# Patient Record
Sex: MALE | Race: WHITE | NOT HISPANIC OR LATINO | Employment: OTHER | URBAN - METROPOLITAN AREA
[De-identification: names, ages, dates, MRNs, and addresses within clinical notes are randomized per-mention and may not be internally consistent; named-entity substitution may affect disease eponyms.]

---

## 2017-03-23 ENCOUNTER — ANESTHESIA EVENT (OUTPATIENT)
Dept: SURGERY | Facility: CLINIC | Age: 77
End: 2017-03-23

## 2017-03-23 ENCOUNTER — APPOINTMENT (OUTPATIENT)
Dept: CT IMAGING | Facility: CLINIC | Age: 77
DRG: 336 | End: 2017-03-23
Attending: EMERGENCY MEDICINE
Payer: MEDICARE

## 2017-03-23 ENCOUNTER — HOSPITAL ENCOUNTER (INPATIENT)
Facility: CLINIC | Age: 77
LOS: 3 days | Discharge: HOME | DRG: 336 | End: 2017-03-26
Attending: EMERGENCY MEDICINE | Admitting: HOSPITALIST
Payer: MEDICARE

## 2017-03-23 ENCOUNTER — ANESTHESIA (OUTPATIENT)
Dept: SURGERY | Facility: CLINIC | Age: 77
End: 2017-03-23

## 2017-03-23 ENCOUNTER — ANESTHESIA EVENT (OUTPATIENT)
Dept: SURGERY | Facility: CLINIC | Age: 77
DRG: 336 | End: 2017-03-23
Payer: MEDICARE

## 2017-03-23 ENCOUNTER — ANESTHESIA (OUTPATIENT)
Dept: SURGERY | Facility: CLINIC | Age: 77
DRG: 336 | End: 2017-03-23
Payer: MEDICARE

## 2017-03-23 DIAGNOSIS — K56.609 SMALL BOWEL OBSTRUCTION (H): ICD-10-CM

## 2017-03-23 LAB
ALBUMIN SERPL-MCNC: 4.1 G/DL (ref 3.4–5)
ALBUMIN UR-MCNC: NEGATIVE MG/DL
ALP SERPL-CCNC: 98 U/L (ref 40–150)
ALT SERPL W P-5'-P-CCNC: 25 U/L (ref 0–70)
ANION GAP SERPL CALCULATED.3IONS-SCNC: 12 MMOL/L (ref 3–14)
ANION GAP SERPL CALCULATED.3IONS-SCNC: 7 MMOL/L (ref 3–14)
APPEARANCE UR: CLEAR
AST SERPL W P-5'-P-CCNC: 21 U/L (ref 0–45)
BASOPHILS # BLD AUTO: 0.1 10E9/L (ref 0–0.2)
BASOPHILS NFR BLD AUTO: 0.4 %
BILIRUB SERPL-MCNC: 0.8 MG/DL (ref 0.2–1.3)
BILIRUB UR QL STRIP: NEGATIVE
BUN SERPL-MCNC: 23 MG/DL (ref 7–30)
BUN SERPL-MCNC: 27 MG/DL (ref 7–30)
CALCIUM SERPL-MCNC: 8.4 MG/DL (ref 8.5–10.1)
CALCIUM SERPL-MCNC: 9 MG/DL (ref 8.5–10.1)
CHLORIDE SERPL-SCNC: 102 MMOL/L (ref 94–109)
CHLORIDE SERPL-SCNC: 106 MMOL/L (ref 94–109)
CO2 SERPL-SCNC: 26 MMOL/L (ref 20–32)
CO2 SERPL-SCNC: 28 MMOL/L (ref 20–32)
COLOR UR AUTO: YELLOW
CREAT SERPL-MCNC: 0.76 MG/DL (ref 0.66–1.25)
CREAT SERPL-MCNC: 0.84 MG/DL (ref 0.66–1.25)
DIFFERENTIAL METHOD BLD: ABNORMAL
EOSINOPHIL # BLD AUTO: 0 10E9/L (ref 0–0.7)
EOSINOPHIL NFR BLD AUTO: 0.2 %
ERYTHROCYTE [DISTWIDTH] IN BLOOD BY AUTOMATED COUNT: 11.8 % (ref 10–15)
ERYTHROCYTE [DISTWIDTH] IN BLOOD BY AUTOMATED COUNT: 11.8 % (ref 10–15)
GFR SERPL CREATININE-BSD FRML MDRD: 88 ML/MIN/1.7M2
GFR SERPL CREATININE-BSD FRML MDRD: ABNORMAL ML/MIN/1.7M2
GLUCOSE SERPL-MCNC: 140 MG/DL (ref 70–99)
GLUCOSE SERPL-MCNC: 182 MG/DL (ref 70–99)
GLUCOSE UR STRIP-MCNC: NEGATIVE MG/DL
HCT VFR BLD AUTO: 48.3 % (ref 40–53)
HCT VFR BLD AUTO: 49.7 % (ref 40–53)
HGB BLD-MCNC: 16.7 G/DL (ref 13.3–17.7)
HGB BLD-MCNC: 17.3 G/DL (ref 13.3–17.7)
HGB UR QL STRIP: NEGATIVE
IMM GRANULOCYTES # BLD: 0.1 10E9/L (ref 0–0.4)
IMM GRANULOCYTES NFR BLD: 0.5 %
INTERPRETATION ECG - MUSE: NORMAL
KETONES UR STRIP-MCNC: NEGATIVE MG/DL
LACTATE BLD-SCNC: 1.4 MMOL/L (ref 0.7–2.1)
LACTATE BLD-SCNC: 2.1 MMOL/L (ref 0.7–2.1)
LEUKOCYTE ESTERASE UR QL STRIP: NEGATIVE
LIPASE SERPL-CCNC: 95 U/L (ref 73–393)
LYMPHOCYTES # BLD AUTO: 2.6 10E9/L (ref 0.8–5.3)
LYMPHOCYTES NFR BLD AUTO: 15.3 %
MCH RBC QN AUTO: 33.7 PG (ref 26.5–33)
MCH RBC QN AUTO: 33.9 PG (ref 26.5–33)
MCHC RBC AUTO-ENTMCNC: 34.6 G/DL (ref 31.5–36.5)
MCHC RBC AUTO-ENTMCNC: 34.8 G/DL (ref 31.5–36.5)
MCV RBC AUTO: 97 FL (ref 78–100)
MCV RBC AUTO: 97 FL (ref 78–100)
MONOCYTES # BLD AUTO: 0.9 10E9/L (ref 0–1.3)
MONOCYTES NFR BLD AUTO: 5.2 %
MUCOUS THREADS #/AREA URNS LPF: PRESENT /LPF
NEUTROPHILS # BLD AUTO: 13.3 10E9/L (ref 1.6–8.3)
NEUTROPHILS NFR BLD AUTO: 78.4 %
NITRATE UR QL: NEGATIVE
NRBC # BLD AUTO: 0 10*3/UL
NRBC BLD AUTO-RTO: 0 /100
PH UR STRIP: 5 PH (ref 5–7)
PLATELET # BLD AUTO: 173 10E9/L (ref 150–450)
PLATELET # BLD AUTO: 216 10E9/L (ref 150–450)
POTASSIUM SERPL-SCNC: 3.8 MMOL/L (ref 3.4–5.3)
POTASSIUM SERPL-SCNC: 4.7 MMOL/L (ref 3.4–5.3)
PROT SERPL-MCNC: 7.1 G/DL (ref 6.8–8.8)
RBC # BLD AUTO: 4.96 10E12/L (ref 4.4–5.9)
RBC # BLD AUTO: 5.11 10E12/L (ref 4.4–5.9)
RBC #/AREA URNS AUTO: 2 /HPF (ref 0–2)
SODIUM SERPL-SCNC: 140 MMOL/L (ref 133–144)
SODIUM SERPL-SCNC: 141 MMOL/L (ref 133–144)
SP GR UR STRIP: 1.03 (ref 1–1.03)
SQUAMOUS #/AREA URNS AUTO: <1 /HPF (ref 0–1)
URN SPEC COLLECT METH UR: ABNORMAL
UROBILINOGEN UR STRIP-MCNC: 0 MG/DL (ref 0–2)
WBC # BLD AUTO: 17 10E9/L (ref 4–11)
WBC # BLD AUTO: 9.7 10E9/L (ref 4–11)
WBC #/AREA URNS AUTO: <1 /HPF (ref 0–2)

## 2017-03-23 PROCEDURE — 25000125 ZZHC RX 250: Performed by: NURSE ANESTHETIST, CERTIFIED REGISTERED

## 2017-03-23 PROCEDURE — 25000128 H RX IP 250 OP 636: Performed by: NURSE ANESTHETIST, CERTIFIED REGISTERED

## 2017-03-23 PROCEDURE — 96374 THER/PROPH/DIAG INJ IV PUSH: CPT | Mod: 59

## 2017-03-23 PROCEDURE — 81001 URINALYSIS AUTO W/SCOPE: CPT | Performed by: EMERGENCY MEDICINE

## 2017-03-23 PROCEDURE — 83605 ASSAY OF LACTIC ACID: CPT | Performed by: HOSPITALIST

## 2017-03-23 PROCEDURE — 25000128 H RX IP 250 OP 636: Performed by: HOSPITALIST

## 2017-03-23 PROCEDURE — 96376 TX/PRO/DX INJ SAME DRUG ADON: CPT

## 2017-03-23 PROCEDURE — 71000012 ZZH RECOVERY PHASE 1 LEVEL 1 FIRST HR: Performed by: SURGERY

## 2017-03-23 PROCEDURE — 36000058 ZZH SURGERY LEVEL 3 EA 15 ADDTL MIN: Performed by: SURGERY

## 2017-03-23 PROCEDURE — 27210794 ZZH OR GENERAL SUPPLY STERILE: Performed by: SURGERY

## 2017-03-23 PROCEDURE — 85025 COMPLETE CBC W/AUTO DIFF WBC: CPT | Performed by: EMERGENCY MEDICINE

## 2017-03-23 PROCEDURE — 37000008 ZZH ANESTHESIA TECHNICAL FEE, 1ST 30 MIN: Performed by: SURGERY

## 2017-03-23 PROCEDURE — 25000128 H RX IP 250 OP 636: Performed by: SURGERY

## 2017-03-23 PROCEDURE — 25000132 ZZH RX MED GY IP 250 OP 250 PS 637: Mod: GY | Performed by: HOSPITALIST

## 2017-03-23 PROCEDURE — 36000056 ZZH SURGERY LEVEL 3 1ST 30 MIN: Performed by: SURGERY

## 2017-03-23 PROCEDURE — 25500064 ZZH RX 255 OP 636: Performed by: EMERGENCY MEDICINE

## 2017-03-23 PROCEDURE — 40000306 ZZH STATISTIC PRE PROC ASSESS II: Performed by: SURGERY

## 2017-03-23 PROCEDURE — 25800025 ZZH RX 258: Performed by: ANESTHESIOLOGY

## 2017-03-23 PROCEDURE — 85027 COMPLETE CBC AUTOMATED: CPT | Performed by: HOSPITALIST

## 2017-03-23 PROCEDURE — 25000128 H RX IP 250 OP 636: Performed by: EMERGENCY MEDICINE

## 2017-03-23 PROCEDURE — 83690 ASSAY OF LIPASE: CPT | Performed by: EMERGENCY MEDICINE

## 2017-03-23 PROCEDURE — S0028 INJECTION, FAMOTIDINE, 20 MG: HCPCS | Performed by: HOSPITALIST

## 2017-03-23 PROCEDURE — 36415 COLL VENOUS BLD VENIPUNCTURE: CPT | Performed by: HOSPITALIST

## 2017-03-23 PROCEDURE — 99285 EMERGENCY DEPT VISIT HI MDM: CPT | Mod: 25

## 2017-03-23 PROCEDURE — 80048 BASIC METABOLIC PNL TOTAL CA: CPT | Performed by: HOSPITALIST

## 2017-03-23 PROCEDURE — 25000125 ZZHC RX 250: Performed by: HOSPITALIST

## 2017-03-23 PROCEDURE — 44180 LAP ENTEROLYSIS: CPT | Performed by: SURGERY

## 2017-03-23 PROCEDURE — 25000125 ZZHC RX 250: Performed by: SURGERY

## 2017-03-23 PROCEDURE — 99223 1ST HOSP IP/OBS HIGH 75: CPT | Mod: AI | Performed by: HOSPITALIST

## 2017-03-23 PROCEDURE — A9270 NON-COVERED ITEM OR SERVICE: HCPCS | Mod: GY | Performed by: HOSPITALIST

## 2017-03-23 PROCEDURE — 25800025 ZZH RX 258: Performed by: NURSE ANESTHETIST, CERTIFIED REGISTERED

## 2017-03-23 PROCEDURE — 83605 ASSAY OF LACTIC ACID: CPT | Performed by: EMERGENCY MEDICINE

## 2017-03-23 PROCEDURE — 99222 1ST HOSP IP/OBS MODERATE 55: CPT | Mod: 57 | Performed by: SURGERY

## 2017-03-23 PROCEDURE — 0DN84ZZ RELEASE SMALL INTESTINE, PERCUTANEOUS ENDOSCOPIC APPROACH: ICD-10-PCS | Performed by: SURGERY

## 2017-03-23 PROCEDURE — 25000566 ZZH SEVOFLURANE, EA 15 MIN: Performed by: SURGERY

## 2017-03-23 PROCEDURE — 80053 COMPREHEN METABOLIC PANEL: CPT | Performed by: EMERGENCY MEDICINE

## 2017-03-23 PROCEDURE — 44005 FREEING OF BOWEL ADHESION: CPT | Mod: AS | Performed by: PHYSICIAN ASSISTANT

## 2017-03-23 PROCEDURE — 25000128 H RX IP 250 OP 636: Performed by: ANESTHESIOLOGY

## 2017-03-23 PROCEDURE — 74177 CT ABD & PELVIS W/CONTRAST: CPT

## 2017-03-23 PROCEDURE — 25800025 ZZH RX 258: Performed by: HOSPITALIST

## 2017-03-23 PROCEDURE — 37000009 ZZH ANESTHESIA TECHNICAL FEE, EACH ADDTL 15 MIN: Performed by: SURGERY

## 2017-03-23 PROCEDURE — 12000000 ZZH R&B MED SURG/OB

## 2017-03-23 RX ORDER — CEFAZOLIN SODIUM 2 G/100ML
2 INJECTION, SOLUTION INTRAVENOUS
Status: COMPLETED | OUTPATIENT
Start: 2017-03-23 | End: 2017-03-23

## 2017-03-23 RX ORDER — EPHEDRINE SULFATE 50 MG/ML
INJECTION, SOLUTION INTRAMUSCULAR; INTRAVENOUS; SUBCUTANEOUS PRN
Status: DISCONTINUED | OUTPATIENT
Start: 2017-03-23 | End: 2017-03-23

## 2017-03-23 RX ORDER — GLYCOPYRROLATE 0.2 MG/ML
INJECTION, SOLUTION INTRAMUSCULAR; INTRAVENOUS PRN
Status: DISCONTINUED | OUTPATIENT
Start: 2017-03-23 | End: 2017-03-23

## 2017-03-23 RX ORDER — ACETAMINOPHEN 325 MG/1
650 TABLET ORAL EVERY 4 HOURS PRN
Status: DISCONTINUED | OUTPATIENT
Start: 2017-03-23 | End: 2017-03-26 | Stop reason: HOSPADM

## 2017-03-23 RX ORDER — DEXAMETHASONE SODIUM PHOSPHATE 4 MG/ML
INJECTION, SOLUTION INTRA-ARTICULAR; INTRALESIONAL; INTRAMUSCULAR; INTRAVENOUS; SOFT TISSUE PRN
Status: DISCONTINUED | OUTPATIENT
Start: 2017-03-23 | End: 2017-03-23

## 2017-03-23 RX ORDER — LABETALOL HYDROCHLORIDE 5 MG/ML
10 INJECTION, SOLUTION INTRAVENOUS
Status: COMPLETED | OUTPATIENT
Start: 2017-03-23 | End: 2017-03-23

## 2017-03-23 RX ORDER — HYDROMORPHONE HYDROCHLORIDE 1 MG/ML
.3-.5 INJECTION, SOLUTION INTRAMUSCULAR; INTRAVENOUS; SUBCUTANEOUS EVERY 5 MIN PRN
Status: DISCONTINUED | OUTPATIENT
Start: 2017-03-23 | End: 2017-03-23 | Stop reason: HOSPADM

## 2017-03-23 RX ORDER — LIDOCAINE HYDROCHLORIDE 10 MG/ML
INJECTION, SOLUTION INFILTRATION; PERINEURAL PRN
Status: DISCONTINUED | OUTPATIENT
Start: 2017-03-23 | End: 2017-03-23

## 2017-03-23 RX ORDER — POTASSIUM CHLORIDE 7.45 MG/ML
10 INJECTION INTRAVENOUS
Status: DISCONTINUED | OUTPATIENT
Start: 2017-03-23 | End: 2017-03-26 | Stop reason: HOSPADM

## 2017-03-23 RX ORDER — NALOXONE HYDROCHLORIDE 0.4 MG/ML
.1-.4 INJECTION, SOLUTION INTRAMUSCULAR; INTRAVENOUS; SUBCUTANEOUS
Status: DISCONTINUED | OUTPATIENT
Start: 2017-03-23 | End: 2017-03-23

## 2017-03-23 RX ORDER — HYDROMORPHONE HYDROCHLORIDE 1 MG/ML
.3-.5 INJECTION, SOLUTION INTRAMUSCULAR; INTRAVENOUS; SUBCUTANEOUS
Status: DISCONTINUED | OUTPATIENT
Start: 2017-03-23 | End: 2017-03-26 | Stop reason: HOSPADM

## 2017-03-23 RX ORDER — PIPERACILLIN SODIUM, TAZOBACTAM SODIUM 3; .375 G/15ML; G/15ML
3.38 INJECTION, POWDER, LYOPHILIZED, FOR SOLUTION INTRAVENOUS
Status: DISCONTINUED | OUTPATIENT
Start: 2017-03-23 | End: 2017-03-23 | Stop reason: HOSPADM

## 2017-03-23 RX ORDER — SODIUM CHLORIDE 9 MG/ML
1000 INJECTION, SOLUTION INTRAVENOUS CONTINUOUS
Status: DISCONTINUED | OUTPATIENT
Start: 2017-03-23 | End: 2017-03-23

## 2017-03-23 RX ORDER — ONDANSETRON 2 MG/ML
4 INJECTION INTRAMUSCULAR; INTRAVENOUS EVERY 6 HOURS PRN
Status: DISCONTINUED | OUTPATIENT
Start: 2017-03-23 | End: 2017-03-26 | Stop reason: HOSPADM

## 2017-03-23 RX ORDER — METOPROLOL TARTRATE 25 MG/1
25 TABLET, FILM COATED ORAL 2 TIMES DAILY
Status: DISCONTINUED | OUTPATIENT
Start: 2017-03-23 | End: 2017-03-26 | Stop reason: HOSPADM

## 2017-03-23 RX ORDER — LABETALOL HYDROCHLORIDE 5 MG/ML
10 INJECTION, SOLUTION INTRAVENOUS
Status: DISCONTINUED | OUTPATIENT
Start: 2017-03-23 | End: 2017-03-23 | Stop reason: HOSPADM

## 2017-03-23 RX ORDER — DEXAMETHASONE SODIUM PHOSPHATE 4 MG/ML
4 INJECTION, SOLUTION INTRA-ARTICULAR; INTRALESIONAL; INTRAMUSCULAR; INTRAVENOUS; SOFT TISSUE
Status: DISCONTINUED | OUTPATIENT
Start: 2017-03-23 | End: 2017-03-23 | Stop reason: HOSPADM

## 2017-03-23 RX ORDER — NALOXONE HYDROCHLORIDE 0.4 MG/ML
.1-.4 INJECTION, SOLUTION INTRAMUSCULAR; INTRAVENOUS; SUBCUTANEOUS
Status: DISCONTINUED | OUTPATIENT
Start: 2017-03-23 | End: 2017-03-26 | Stop reason: HOSPADM

## 2017-03-23 RX ORDER — AMLODIPINE BESYLATE 5 MG/1
5 TABLET ORAL DAILY
Status: DISCONTINUED | OUTPATIENT
Start: 2017-03-23 | End: 2017-03-26 | Stop reason: HOSPADM

## 2017-03-23 RX ORDER — HYDRALAZINE HYDROCHLORIDE 20 MG/ML
2.5-5 INJECTION INTRAMUSCULAR; INTRAVENOUS EVERY 10 MIN PRN
Status: DISCONTINUED | OUTPATIENT
Start: 2017-03-23 | End: 2017-03-23 | Stop reason: HOSPADM

## 2017-03-23 RX ORDER — ONDANSETRON 4 MG/1
4 TABLET, ORALLY DISINTEGRATING ORAL EVERY 6 HOURS PRN
Status: DISCONTINUED | OUTPATIENT
Start: 2017-03-23 | End: 2017-03-26 | Stop reason: HOSPADM

## 2017-03-23 RX ORDER — PROCHLORPERAZINE 25 MG
12.5 SUPPOSITORY, RECTAL RECTAL EVERY 12 HOURS PRN
Status: DISCONTINUED | OUTPATIENT
Start: 2017-03-23 | End: 2017-03-26 | Stop reason: HOSPADM

## 2017-03-23 RX ORDER — BUPIVACAINE HYDROCHLORIDE AND EPINEPHRINE 5; 5 MG/ML; UG/ML
INJECTION, SOLUTION PERINEURAL PRN
Status: DISCONTINUED | OUTPATIENT
Start: 2017-03-23 | End: 2017-03-23 | Stop reason: HOSPADM

## 2017-03-23 RX ORDER — SODIUM CHLORIDE, SODIUM LACTATE, POTASSIUM CHLORIDE, CALCIUM CHLORIDE 600; 310; 30; 20 MG/100ML; MG/100ML; MG/100ML; MG/100ML
INJECTION, SOLUTION INTRAVENOUS CONTINUOUS PRN
Status: DISCONTINUED | OUTPATIENT
Start: 2017-03-23 | End: 2017-03-23

## 2017-03-23 RX ORDER — ONDANSETRON 2 MG/ML
INJECTION INTRAMUSCULAR; INTRAVENOUS
Status: DISCONTINUED
Start: 2017-03-23 | End: 2017-03-23 | Stop reason: HOSPADM

## 2017-03-23 RX ORDER — DROPERIDOL 2.5 MG/ML
0.62 INJECTION, SOLUTION INTRAMUSCULAR; INTRAVENOUS
Status: DISCONTINUED | OUTPATIENT
Start: 2017-03-23 | End: 2017-03-23 | Stop reason: RX

## 2017-03-23 RX ORDER — MAGNESIUM SULFATE HEPTAHYDRATE 40 MG/ML
4 INJECTION, SOLUTION INTRAVENOUS EVERY 4 HOURS PRN
Status: DISCONTINUED | OUTPATIENT
Start: 2017-03-23 | End: 2017-03-26 | Stop reason: HOSPADM

## 2017-03-23 RX ORDER — HYDROMORPHONE HYDROCHLORIDE 1 MG/ML
0.5 INJECTION, SOLUTION INTRAMUSCULAR; INTRAVENOUS; SUBCUTANEOUS
Status: DISCONTINUED | OUTPATIENT
Start: 2017-03-23 | End: 2017-03-23

## 2017-03-23 RX ORDER — ONDANSETRON 4 MG/1
4 TABLET, ORALLY DISINTEGRATING ORAL EVERY 30 MIN PRN
Status: DISCONTINUED | OUTPATIENT
Start: 2017-03-23 | End: 2017-03-23 | Stop reason: HOSPADM

## 2017-03-23 RX ORDER — IOPAMIDOL 755 MG/ML
500 INJECTION, SOLUTION INTRAVASCULAR ONCE
Status: COMPLETED | OUTPATIENT
Start: 2017-03-23 | End: 2017-03-23

## 2017-03-23 RX ORDER — ONDANSETRON 2 MG/ML
4 INJECTION INTRAMUSCULAR; INTRAVENOUS EVERY 30 MIN PRN
Status: DISCONTINUED | OUTPATIENT
Start: 2017-03-23 | End: 2017-03-23 | Stop reason: HOSPADM

## 2017-03-23 RX ORDER — FENTANYL CITRATE 50 UG/ML
INJECTION, SOLUTION INTRAMUSCULAR; INTRAVENOUS PRN
Status: DISCONTINUED | OUTPATIENT
Start: 2017-03-23 | End: 2017-03-23

## 2017-03-23 RX ORDER — POTASSIUM CHLORIDE 29.8 MG/ML
20 INJECTION INTRAVENOUS
Status: DISCONTINUED | OUTPATIENT
Start: 2017-03-23 | End: 2017-03-26 | Stop reason: HOSPADM

## 2017-03-23 RX ORDER — PROCHLORPERAZINE MALEATE 5 MG
5 TABLET ORAL EVERY 6 HOURS PRN
Status: DISCONTINUED | OUTPATIENT
Start: 2017-03-23 | End: 2017-03-26 | Stop reason: HOSPADM

## 2017-03-23 RX ORDER — SODIUM CHLORIDE, SODIUM LACTATE, POTASSIUM CHLORIDE, CALCIUM CHLORIDE 600; 310; 30; 20 MG/100ML; MG/100ML; MG/100ML; MG/100ML
INJECTION, SOLUTION INTRAVENOUS CONTINUOUS
Status: DISCONTINUED | OUTPATIENT
Start: 2017-03-23 | End: 2017-03-23 | Stop reason: HOSPADM

## 2017-03-23 RX ORDER — NEOSTIGMINE METHYLSULFATE 1 MG/ML
VIAL (ML) INJECTION PRN
Status: DISCONTINUED | OUTPATIENT
Start: 2017-03-23 | End: 2017-03-23

## 2017-03-23 RX ORDER — DIMENHYDRINATE 50 MG/ML
25 INJECTION, SOLUTION INTRAMUSCULAR; INTRAVENOUS
Status: DISCONTINUED | OUTPATIENT
Start: 2017-03-23 | End: 2017-03-23 | Stop reason: HOSPADM

## 2017-03-23 RX ORDER — POTASSIUM CHLORIDE 1500 MG/1
20-40 TABLET, EXTENDED RELEASE ORAL
Status: DISCONTINUED | OUTPATIENT
Start: 2017-03-23 | End: 2017-03-26 | Stop reason: HOSPADM

## 2017-03-23 RX ORDER — CEFAZOLIN SODIUM 1 G/3ML
1 INJECTION, POWDER, FOR SOLUTION INTRAMUSCULAR; INTRAVENOUS SEE ADMIN INSTRUCTIONS
Status: DISCONTINUED | OUTPATIENT
Start: 2017-03-23 | End: 2017-03-23 | Stop reason: HOSPADM

## 2017-03-23 RX ORDER — LABETALOL HYDROCHLORIDE 5 MG/ML
10 INJECTION, SOLUTION INTRAVENOUS ONCE
Status: COMPLETED | OUTPATIENT
Start: 2017-03-23 | End: 2017-03-23

## 2017-03-23 RX ORDER — FENTANYL CITRATE 50 UG/ML
25-50 INJECTION, SOLUTION INTRAMUSCULAR; INTRAVENOUS
Status: DISCONTINUED | OUTPATIENT
Start: 2017-03-23 | End: 2017-03-23 | Stop reason: HOSPADM

## 2017-03-23 RX ORDER — PROPOFOL 10 MG/ML
INJECTION, EMULSION INTRAVENOUS PRN
Status: DISCONTINUED | OUTPATIENT
Start: 2017-03-23 | End: 2017-03-23

## 2017-03-23 RX ORDER — KETOROLAC TROMETHAMINE 30 MG/ML
15 INJECTION, SOLUTION INTRAMUSCULAR; INTRAVENOUS
Status: DISCONTINUED | OUTPATIENT
Start: 2017-03-23 | End: 2017-03-23 | Stop reason: HOSPADM

## 2017-03-23 RX ORDER — LIDOCAINE 40 MG/G
CREAM TOPICAL
Status: DISCONTINUED | OUTPATIENT
Start: 2017-03-23 | End: 2017-03-23 | Stop reason: HOSPADM

## 2017-03-23 RX ORDER — DEXTROSE MONOHYDRATE, SODIUM CHLORIDE, AND POTASSIUM CHLORIDE 50; 1.49; 4.5 G/1000ML; G/1000ML; G/1000ML
INJECTION, SOLUTION INTRAVENOUS CONTINUOUS
Status: DISCONTINUED | OUTPATIENT
Start: 2017-03-23 | End: 2017-03-26 | Stop reason: HOSPADM

## 2017-03-23 RX ORDER — OXYCODONE HYDROCHLORIDE 5 MG/1
5-10 TABLET ORAL
Status: DISCONTINUED | OUTPATIENT
Start: 2017-03-23 | End: 2017-03-26 | Stop reason: HOSPADM

## 2017-03-23 RX ORDER — POTASSIUM CHLORIDE 1.5 G/1.58G
20-40 POWDER, FOR SOLUTION ORAL
Status: DISCONTINUED | OUTPATIENT
Start: 2017-03-23 | End: 2017-03-26 | Stop reason: HOSPADM

## 2017-03-23 RX ADMIN — SODIUM CHLORIDE 1000 ML: 9 INJECTION, SOLUTION INTRAVENOUS at 02:04

## 2017-03-23 RX ADMIN — FAMOTIDINE 20 MG: 10 INJECTION, SOLUTION INTRAVENOUS at 06:17

## 2017-03-23 RX ADMIN — OXYCODONE HYDROCHLORIDE 10 MG: 5 TABLET ORAL at 11:14

## 2017-03-23 RX ADMIN — HYDROMORPHONE HYDROCHLORIDE 0.5 MG: 1 INJECTION, SOLUTION INTRAMUSCULAR; INTRAVENOUS; SUBCUTANEOUS at 02:06

## 2017-03-23 RX ADMIN — HYDROMORPHONE HYDROCHLORIDE 0.5 MG: 1 INJECTION, SOLUTION INTRAMUSCULAR; INTRAVENOUS; SUBCUTANEOUS at 10:39

## 2017-03-23 RX ADMIN — PHENYLEPHRINE HYDROCHLORIDE 50 MCG: 10 INJECTION, SOLUTION INTRAMUSCULAR; INTRAVENOUS; SUBCUTANEOUS at 16:11

## 2017-03-23 RX ADMIN — CEFAZOLIN SODIUM 2 G: 2 INJECTION, SOLUTION INTRAVENOUS at 15:56

## 2017-03-23 RX ADMIN — FENTANYL CITRATE 50 MCG: 50 INJECTION, SOLUTION INTRAMUSCULAR; INTRAVENOUS at 16:31

## 2017-03-23 RX ADMIN — PHENYLEPHRINE HYDROCHLORIDE 150 MCG: 10 INJECTION, SOLUTION INTRAMUSCULAR; INTRAVENOUS; SUBCUTANEOUS at 16:08

## 2017-03-23 RX ADMIN — SODIUM CHLORIDE, POTASSIUM CHLORIDE, SODIUM LACTATE AND CALCIUM CHLORIDE: 600; 310; 30; 20 INJECTION, SOLUTION INTRAVENOUS at 15:56

## 2017-03-23 RX ADMIN — ROCURONIUM BROMIDE 15 MG: 10 INJECTION INTRAVENOUS at 16:33

## 2017-03-23 RX ADMIN — METOPROLOL TARTRATE 1 MG: 5 INJECTION INTRAVENOUS at 16:20

## 2017-03-23 RX ADMIN — POTASSIUM CHLORIDE, DEXTROSE MONOHYDRATE AND SODIUM CHLORIDE: 150; 5; 450 INJECTION, SOLUTION INTRAVENOUS at 19:09

## 2017-03-23 RX ADMIN — HYDROMORPHONE HYDROCHLORIDE 0.5 MG: 1 INJECTION, SOLUTION INTRAMUSCULAR; INTRAVENOUS; SUBCUTANEOUS at 01:31

## 2017-03-23 RX ADMIN — Medication 4 MG: at 16:55

## 2017-03-23 RX ADMIN — ONDANSETRON 4 MG: 2 INJECTION INTRAMUSCULAR; INTRAVENOUS at 08:04

## 2017-03-23 RX ADMIN — ROCURONIUM BROMIDE 35 MG: 10 INJECTION INTRAVENOUS at 16:05

## 2017-03-23 RX ADMIN — SODIUM CHLORIDE, POTASSIUM CHLORIDE, SODIUM LACTATE AND CALCIUM CHLORIDE: 600; 310; 30; 20 INJECTION, SOLUTION INTRAVENOUS at 16:16

## 2017-03-23 RX ADMIN — ROCURONIUM BROMIDE 15 MG: 10 INJECTION INTRAVENOUS at 16:30

## 2017-03-23 RX ADMIN — PROPOFOL 160 MG: 10 INJECTION, EMULSION INTRAVENOUS at 16:05

## 2017-03-23 RX ADMIN — FENTANYL CITRATE 50 MCG: 50 INJECTION, SOLUTION INTRAMUSCULAR; INTRAVENOUS at 16:25

## 2017-03-23 RX ADMIN — PHENYLEPHRINE HYDROCHLORIDE 200 MCG: 10 INJECTION, SOLUTION INTRAMUSCULAR; INTRAVENOUS; SUBCUTANEOUS at 16:20

## 2017-03-23 RX ADMIN — FENTANYL CITRATE 100 MCG: 50 INJECTION, SOLUTION INTRAMUSCULAR; INTRAVENOUS at 16:03

## 2017-03-23 RX ADMIN — LABETALOL HYDROCHLORIDE 10 MG: 5 INJECTION, SOLUTION INTRAVENOUS at 17:20

## 2017-03-23 RX ADMIN — SODIUM CHLORIDE 64 ML: 9 INJECTION, SOLUTION INTRAVENOUS at 02:17

## 2017-03-23 RX ADMIN — ONDANSETRON 4 MG: 2 INJECTION INTRAMUSCULAR; INTRAVENOUS at 19:08

## 2017-03-23 RX ADMIN — PHENYLEPHRINE HYDROCHLORIDE 150 MCG: 10 INJECTION, SOLUTION INTRAMUSCULAR; INTRAVENOUS; SUBCUTANEOUS at 16:15

## 2017-03-23 RX ADMIN — HYDROMORPHONE HYDROCHLORIDE 0.5 MG: 1 INJECTION, SOLUTION INTRAMUSCULAR; INTRAVENOUS; SUBCUTANEOUS at 08:01

## 2017-03-23 RX ADMIN — PHENYLEPHRINE HYDROCHLORIDE 150 MCG: 10 INJECTION, SOLUTION INTRAMUSCULAR; INTRAVENOUS; SUBCUTANEOUS at 16:26

## 2017-03-23 RX ADMIN — LIDOCAINE HYDROCHLORIDE 50 MG: 10 INJECTION, SOLUTION INFILTRATION; PERINEURAL at 16:05

## 2017-03-23 RX ADMIN — Medication 10 MG: at 16:11

## 2017-03-23 RX ADMIN — HYDROMORPHONE HYDROCHLORIDE 0.5 MG: 1 INJECTION, SOLUTION INTRAMUSCULAR; INTRAVENOUS; SUBCUTANEOUS at 13:26

## 2017-03-23 RX ADMIN — SODIUM CHLORIDE 1000 ML: 9 INJECTION, SOLUTION INTRAVENOUS at 02:05

## 2017-03-23 RX ADMIN — ONDANSETRON 4 MG: 2 INJECTION INTRAMUSCULAR; INTRAVENOUS at 16:05

## 2017-03-23 RX ADMIN — PHENYLEPHRINE HYDROCHLORIDE 100 MCG: 10 INJECTION, SOLUTION INTRAMUSCULAR; INTRAVENOUS; SUBCUTANEOUS at 16:53

## 2017-03-23 RX ADMIN — PHENYLEPHRINE HYDROCHLORIDE 0 MCG/KG/MIN: 10 INJECTION, SOLUTION INTRAMUSCULAR; INTRAVENOUS; SUBCUTANEOUS at 16:16

## 2017-03-23 RX ADMIN — IOPAMIDOL 95 ML: 755 INJECTION, SOLUTION INTRAVENOUS at 02:14

## 2017-03-23 RX ADMIN — LABETALOL HYDROCHLORIDE 10 MG: 5 INJECTION, SOLUTION INTRAVENOUS at 17:53

## 2017-03-23 RX ADMIN — GLYCOPYRROLATE 0.6 MG: 0.2 INJECTION, SOLUTION INTRAMUSCULAR; INTRAVENOUS at 16:55

## 2017-03-23 RX ADMIN — METOPROLOL TARTRATE 3 MG: 5 INJECTION INTRAVENOUS at 17:12

## 2017-03-23 RX ADMIN — POTASSIUM CHLORIDE, DEXTROSE MONOHYDRATE AND SODIUM CHLORIDE: 150; 5; 450 INJECTION, SOLUTION INTRAVENOUS at 06:20

## 2017-03-23 RX ADMIN — GLYCOPYRROLATE 0.1 MG: 0.2 INJECTION, SOLUTION INTRAMUSCULAR; INTRAVENOUS at 16:05

## 2017-03-23 RX ADMIN — DEXAMETHASONE SODIUM PHOSPHATE 4 MG: 4 INJECTION, SOLUTION INTRAMUSCULAR; INTRAVENOUS at 16:05

## 2017-03-23 ASSESSMENT — COPD QUESTIONNAIRES: COPD: 0

## 2017-03-23 ASSESSMENT — ENCOUNTER SYMPTOMS
HEMATURIA: 0
DYSRHYTHMIAS: 0
STRIDOR: 0
SEIZURES: 0
DYSURIA: 0
DIFFICULTY URINATING: 0
NAUSEA: 1
CONSTIPATION: 1
ABDOMINAL PAIN: 1
BACK PAIN: 0
DIARRHEA: 0
FREQUENCY: 0

## 2017-03-23 ASSESSMENT — PAIN DESCRIPTION - DESCRIPTORS
DESCRIPTORS: CONSTANT;THROBBING

## 2017-03-23 ASSESSMENT — LIFESTYLE VARIABLES: TOBACCO_USE: 1

## 2017-03-23 NOTE — PROVIDER NOTIFICATION
Dr. Mcmahan, GIOVANY, at the bedside and orders received to administer Labetalol 10mg for systolic blood pressures in the 200's. Will administer and continue to monitor.

## 2017-03-23 NOTE — OP NOTE
General Surgery Operative Note    Pre-operative diagnosis: Closed Loop Small Bowel Obstruction   Post-operative diagnosis: same   Procedure: Laparoscopic Lysis of Adhesions and Diagnostic Laparoscopy   Surgeon: Ramón Garcia MD   Assistant(s): Melani Joyce PA-C,  the physician assistant was medically necessary to assist in prepping, positioning, camera operation, retraction/exposure and closure of the port site.      Anesthesia: General    Estimated blood loss: 2 cc's   Drains placed: None   Complications:  None   Findings:   there was a hemorrhagic loop of bowel in the central abdomen with an obvious obstructing band going up to near the umbilicus.  This band was divided, allowing the bowel to mobilize.  We followed the hemorrhagic loop in both directions until completely normal-looking bowel was encountered.  All the visualized hemorrhagic bowel appeared viable.       Indications for operation: This is a 76-year-old gentleman who presented with abdominal pain, nausea and vomiting.  He presented to the emergency room, and a CT scan revealed a small bowel obstruction with concern for a closed loop.  Immediate operation was recommended, but the patient was feeling somewhat better, and refused operative intervention last night.  He has continued to have some discomfort, and was once again seen by Dr. Landa, who recommended surgery.  The patient now agreed to proceed.  We discussed the procedure, along with its risks and complications, in detail.  The patient agreed to proceed.    Details of the operation: After informed consent, the patient was taken to the operating room where he underwent satisfactory induction of general anesthesia.  The patient was sterilely prepped and draped and a supraumbilical skin incision was made.  Dissection was carried bluntly down to the fascia, which was opened using electrocautery.  The Capone trocar was introduced and fixed in place using stay sutures.  Pneumoperitoneum was now  achieved using CO2 insufflation.  The camera was introduced, and immediately we saw some hemorrhagic bowel.  Looking towards the umbilicus, there was an obvious area where the bowel was stuck up near the abdominal wall.  In order to facilitate visualization and manipulation here, three right-sided 5 mm ports were placed under direct visualization we now moved the camera out laterally, and we are able to see a tight band causing an obstruction near the umbilicus.  This band was divided using blunt dissection, freeing the loop of hemorrhagic bowel.  We now proceeded to run the bowel from the area of hemorrhagic change proximally and distally until entirely normal-looking bowel was identified.  All of the hemorrhagic appearing bowel seemed viable.  There was a moderate amount of mesenteric and bowel edema.  The sites for the patient's previous gastrostomy and apparent jejunostomy were not involved in the obstruction.  The permanent adhesions and those sites were left in place.  All of the remainder of the visualized bowel appeared normal.  The small bowel was not run for its entire length, due to the fact that we could clearly identify the area of obstruction, and I wish to avoid the potential of manipulation injury.  The trocar sites were now infiltrated with 0.5% Marcaine and the trochars were removed under direct visualization.  The supraumbilical fascia was closed using interrupted 0 Vicryl sutures, and the skin was closed using 4-0 subcuticular Vicryl followed by Steri-Strips.    The patient tolerated the procedure well and was transferred to the recovery room in satisfactory condition.  Sponge and needle counts were correct at the close of the case.    Specimens: * No specimens in log *        Ramón Garcia MD

## 2017-03-23 NOTE — ANESTHESIA PREPROCEDURE EVALUATION
PAC NOTE:       ANESTHESIA PRE EVALUATION:  Anesthesia Evaluation     . Pt has had prior anesthetic. Type: General    No history of anesthetic complications          ROS/MED HX    ENT/Pulmonary:     (+)CADENCE risk factors hypertension, tobacco use, Past use Intermittent asthma , . .   (-) COPD and recent URI   Neurologic:     (+)delerium (after thoracotomies/haital hernia repair 2 years ago.) resolved Yes,    (-) seizures and CVA   Cardiovascular:     (+) hypertension----. : . . . :. . Previous cardiac testing date:results:date: results:ECG reviewed date:3/17 results:Sinus.  1st degree AV block. date: results:         (-) CAD, arrhythmias and valvular problems/murmurs   METS/Exercise Tolerance:     Hematologic: Comments: Lab Test        03/23/17     03/23/17     02/25/15      --          02/13/15     02/12/15     02/11/15                       0543          0115          0605           --           0747          1840          0737          WBC          9.7          17.0*        9.2            < >        10.8         9.6          7.8           HGB          16.7         17.3         11.4*          < >        14.6         14.0         13.4          MCV          97           97           101*           < >        93           95           97            PLT          173          216          237            < >        103*         101*         108*          INR           --           --           --           --          1.03         1.10         1.32*          < > = values in this interval not displayed.                  Lab Test        03/23/17     03/23/17     02/24/15                       0543          0115          0606          NA           141          140          142           POTASSIUM    4.7          3.8          4.2           CHLORIDE     106          102          110*          CO2          28           26           27            BUN          23           27           32*           CR           0.76         0.84          0.72          ANIONGAP     7            12           5             JOANN          8.4*         9.0          7.7*          GLC          140*         182*         101*         - neg hematologic  ROS       Musculoskeletal:  - neg musculoskeletal ROS       GI/Hepatic:  - neg GI/hepatic ROS      (-) GERD, hepatitis and liver disease   Renal/Genitourinary:  - ROS Renal section negative       Endo:  - neg endo ROS    (-) Type I DM, Type II DM, thyroid disease, chronic steroid usage and obesity   Psychiatric:  - neg psychiatric ROS       Infectious Disease:  - neg infectious disease ROS       Malignancy:         Other:    - neg other ROS                 Physical Exam  Normal systems: cardiovascular, pulmonary and dental    Airway   Mallampati: II  TM distance: >3 FB  Neck ROM: full    Dental     Cardiovascular   Rhythm and rate: regular and normal  (-) no friction rub, no systolic click and no murmur    Pulmonary    breath sounds clear to auscultation(-) no rhonchi, no decreased breath sounds, no wheezes, no rales and no stridor             Anesthesia Plan      History & Physical Review  History and physical reviewed and following examination; no interval change.    ASA Status:  2 .    NPO Status:  > 8 hours    Plan for General and ETT with Intravenous induction. Maintenance will be Balanced.    PONV prophylaxis:  Ondansetron (or other 5HT-3) and Dexamethasone or Solumedrol       Postoperative Care  Postoperative pain management:  IV analgesics.      Consents  Anesthetic plan, risks, benefits and alternatives discussed with:  Patient or representative, Patient and Spouse..                            .

## 2017-03-23 NOTE — PROGRESS NOTES
Labs improved today and VSS.  Pain returned 5 hours ago.  crampy and 4/10.  Passes some flatus and tiny amt of stool 5 hours ago, none since.  Denies nausea.  We discussed, once again, my recommendation for exploratory surgery and the reasoning (bowel ischemia and rupture).  The pt tentatively elects to proceed but wishes to wait for his wife to arrive to make a final decision.  I have urged him to call his wife and ask her to come in asap.  Case discussed with Dr. Garcia.  Marietta Landa MD

## 2017-03-23 NOTE — CONSULTS
General Surgery Consultation    Pablo Fuentes MRN# [id number]   Age: 76 year old YOB: 1940     Date of Admission:  3/23/2017    Reason for consult:            Abdominal pain, left upper and lower quadrant       Requesting physician:            Dr. Titus                Assessment and Plan:   Assessment:   Small bowel obstruction with possible closed loop mechanism.  Leukocytosis and lactic acidosis.  Pt's pain is now resolved and he is minimally tender.      Plan:   I have discussed with the pt and his wife the nature and risk associated with a closed loop sbo.  I recommend urgent operative intervention.  They understand the risk of bowel ischemia and perforation without surgical intervention.  Specifically we discussed exploratory laparoscopy and possible laparotomy with possible small bowel resection and primary anastomosis. They understand these risks and decline surgery.      Admit to the hospitalist [facility].  Continue conservative management with bowel rest, npo, IVF, and NG tube decompression if abd pain, nausea or vomiting recur.  Surgical intervention may be needed if the clinical picture worsens or if the patient fails to progress with conservative measure.                 Chief Complaint:   Abdominal pain, left upper and lower quadrant    History is obtained from the patient         History of Present Illness:   This patient is a 76 year old  male who presents with abdominal pain in the left upper quadrant and in the left lower quadrant for the past 9 hours.  The pain was constant but has resolved.  Last IV dilaudid was 2 hrs ago.  Associated symptoms include with nausea and vomiting.  He denies nausea now. Negative for associated symptoms of diarrhea, fever and chills.  Last bowel movement was earlier this evening after the onset of pain.  Last flatus this evening.  At baseline BMs are formed.   He does not have a history of bowel obstructions in the past.            Past Medical  History:    has a past medical history of Hypertension.          Past Surgical History:     Past Surgical History:   Procedure Laterality Date     BRONCHOSCOPY FLEXIBLE N/A 2/13/2015    Procedure: BRONCHOSCOPY FLEXIBLE;  Surgeon: Christo Gilbert MD;  Location: [facility] OR     ESOPHAGOSCOPY, GASTROSCOPY, DUODENOSCOPY (EGD), COMBINED N/A 2/10/2015    Procedure: COMBINED ESOPHAGOSCOPY, GASTROSCOPY, DUODENOSCOPY (EGD);  Surgeon: Christo Gilbert MD;  Location:  OR     EYE SURGERY       HERNIA REPAIR       LAPAROSCOPIC ASSISTED INSERTION TUBE GASTROTOMY N/A 2/13/2015    Procedure: LAPAROSCOPIC ASSISTED INSERTION TUBE GASTROSTOMY;  Surgeon: Christo Gilbert MD;  Location:  OR     LAPAROSCOPIC HERNIORRHAPHY GIANT PARAESOPHAGEAL N/A 2/13/2015    Procedure: LAPAROSCOPIC HERNIORRHAPHY GIANT PARAESOPHAGEAL;  Surgeon: Christo Gilbert MD;  Location:  OR             Social History:     Social History   Substance Use Topics     Smoking status: Never Smoker     Smokeless tobacco: Not on file     Alcohol use No             Family History:   Noncontributory         Allergies:   All allergies reviewed and addressed          Medications:     No current facility-administered medications on file prior to encounter.   Current Outpatient Prescriptions on File Prior to Encounter:  multivitamin, therapeutic with minerals (THERA-VIT-M) TABS Take 1 tablet by mouth daily   OXYCODONE HCL PO Take 2.5 mg by mouth every 4 hours as needed   Acetaminophen (TYLENOL PO) Take 1,000 mg by mouth 3 times daily   AmLODIPine Besylate (NORVASC PO) Take 5 mg by mouth daily   Metoprolol Tartrate (LOPRESSOR PO) Take 25 mg by mouth 2 times daily   Ranitidine HCl (ZANTAC PO) Take 150 mg by mouth 2 times daily   sennosides (SENOKOT) 8.6 MG tablet Take 1 tablet by mouth 2 times daily   QUEtiapine (SEROQUEL) 50 MG tablet Take 1 tablet (50 mg) by mouth 2 times daily   albuterol (2.5 MG/3ML) 0.083% nebulizer solution Take 1 vial  "(2.5 mg) by nebulization every 2 hours as needed for other (dyspnea)       piperacillin-tazobactam  3.375 g Intravenous Pre-Op/Pre-procedure x 1 dose            Review of Systems:   The 10 point review of systems is negative other than noted in the HPI.          Physical Exam:   /76  Temp 98.2  F (36.8  C) (Temporal)  Resp 18  Ht 1.727 m (5' 8\")  Wt 86.2 kg (190 lb)  SpO2 93%  BMI 28.89 kg/m2  General - Well developed, well nourished male in no apparent distress  HEENT:  Head normocephalic and atraumatic, pupils equal and round, conjunctivae clear, no scleral icterus, mucous membranes dry, external ears and nose normal  Neck: Supple without thyromegaly or masses  Lymphatic: No cervical, or supraclavicular lymphadenopathy  Lungs: Clear to auscultation bilaterally  Heart: regular rate and rhythm, no murmurs  Abdomen:   soft, obese, not distended with mild tenderness noted in the left upper quadrant.  No rebound or guarding.  no masses palpated.  Extremities: Warm without edema  Neurologic: nonfocal  Psychiatric: Mood and affect appropriate  Skin: Without lesions, rashes, or juandice         Data:     Lab Results   Component Value Date    WBC 17.0 03/23/2017     Lab Results   Component Value Date    HGB 17.3 03/23/2017     Lab Results   Component Value Date     03/23/2017     Last Basic Metabolic Panel:  Lab Results   Component Value Date     03/23/2017      Lab Results   Component Value Date    POTASSIUM 3.8 03/23/2017     Lab Results   Component Value Date    CHLORIDE 102 03/23/2017     Lab Results   Component Value Date    JOANN 9.0 03/23/2017     Lab Results   Component Value Date    CO2 26 03/23/2017     Lab Results   Component Value Date    BUN 27 03/23/2017     Lab Results   Component Value Date    CR 0.84 03/23/2017     Lab Results   Component Value Date     03/23/2017         Imaging:  All imaging studies reviewed by me.    Results for orders placed or performed during the " hospital encounter of 03/23/17   CT Abdomen Pelvis w Contrast    Narrative    CT ABDOMEN PELVIS W CONTRAST  3/23/2017 2:22 AM     HISTORY: Abdominal pain. Lower abdominal pain. History of hernia  repair.    TECHNIQUE: Volumetric acquisition through abdomen and pelvis with I.V.  contrast. 95 mL Isovue-370. Radiation dose for this scan was reduced  using automated exposure control, adjustment of the mA and/or kV  according to patient size, or iterative reconstruction technique.    COMPARISON: 2/10/2015.    FINDINGS: Multiple large gallstones in a mildly distended gallbladder.  No pericholecystic inflammation. There are a few tiny hypodensities in  the liver which are too small to characterize but are probably tiny  cysts. Pancreas, spleen, adrenal glands and kidneys demonstrate no  worrisome findings. Repair of the previously seen large hiatal hernia.  Again seen is a prominent duodenal diverticulum adjacent to the  pancreatic head.    Multiple fluid-filled dilated small bowel loops with decompressed  distal small bowel compatible with mechanical obstruction. There  appears to be an abrupt transition site in the left anterior pelvis.  Configuration of other bowel loops in this region suggests the  possibility that this is an internal hernia or other closed loop  obstruction. There is moderate fat stranding and mesenteric edema  adjacent to some of these loops in the left abdomen. Colonic  diverticulosis most prominent in the sigmoid colon. Small amount of  ascites. No free air.      Impression    IMPRESSION:  1. Mechanical small bowel obstruction. An internal hernia or other  closed loop obstruction cannot be excluded. Associated ascites and  moderate mesenteric stranding and edema. Recommend surgical  consultation.  2. Cholelithiasis.  3. Colonic diverticulosis.    MAURICIO PLASENCIA MD       This note was created using voice recognition software. Undetected word substitutions or other errors may have occurred.      Marietta Landa MD    Time spent with the patient, reviewing the EMR, reviewing laboratory and imaging studies, more than 50% of which was counseling and coordinating care:  30 minutes.

## 2017-03-23 NOTE — PROGRESS NOTES
Patient seen and examined. He was admitted this morning. H&P reviewed, labs and medications reviewed. He started to have pain and got few doses of pain medications. Surgery reevaluated him and planning on taking him to the OR for exploratory lap.  Abdomen is soft, mildly tender, no guarding.  Continue to monitor post op.  I discussed with patient and his wife.

## 2017-03-23 NOTE — ANESTHESIA POSTPROCEDURE EVALUATION
Patient: Pablo Fuentes    Procedure(s):  Diagnostic Laparoscopy, Lysis of adhesion - Wound Class: I-Clean    Diagnosis:Small Bowel Obstruction  Diagnosis Additional Information: Closed Loop Small Bowel Obstruction    Anesthesia Type:  General, ETT    Note:  Anesthesia Post Evaluation    Patient location during evaluation: PACU  Patient participation: Able to fully participate in evaluation  Level of consciousness: awake  Pain management: adequate  Airway patency: patent  Cardiovascular status: acceptable  Respiratory status: acceptable  Hydration status: acceptable  PONV: controlled     Anesthetic complications: None          Last vitals:  Vitals:    03/23/17 1730 03/23/17 1735 03/23/17 1745   BP: (!) 182/140 (!) 193/136 (!) 177/110   Resp: 18 18 13   Temp:      SpO2: 96%           Electronically Signed By: Azael Mcmahan MD  March 23, 2017  5:58 PM

## 2017-03-23 NOTE — ANESTHESIA CARE TRANSFER NOTE
Patient: aPblo Fuentes    Procedure(s):  Diagnostic Laparoscopy, Lysis of adhesion - Wound Class: I-Clean    Diagnosis: Small Bowel Obstruction  Diagnosis Additional Information: No value filed.    Anesthesia Type:   General, ETT     Note:  Airway :Blow-by  Patient transferred to:PACU  Comments: Transferred to recovery with MDA, confused, spontaneous respirations, adequate ventilation/oxygenation during transfer, report shared.      Vitals: (Last set prior to Anesthesia Care Transfer)    CRNA VITALS  3/23/2017 1645 - 3/23/2017 1723      3/23/2017             Pulse: 85    SpO2: 96 %                Electronically Signed By: PAT Stewart CRNA  March 23, 2017  5:23 PM

## 2017-03-23 NOTE — PLAN OF CARE
Problem: Goal Outcome Summary  Goal: Goal Outcome Summary  RN: pt has been having steady amounts of pain since this am -pain semi-controlled with dilaudid. Pt and wife decided they will proceed with surgery updated Surgeon waiting to here when it will be scheduled.

## 2017-03-23 NOTE — PROVIDER NOTIFICATION
Dr. Timmons, [facility], notified of systolic blood pressures in the 160's. No new orders received. Okay to transfer back to the floor.

## 2017-03-23 NOTE — ED PROVIDER NOTES
"  History     Chief Complaint:  Abdominal Pain    HPI   Pablo Fuentes is a 76 year old male who presents to the emergency department today with his wife for evaluation of abdominal pain. The patient reports that at 1900 last evening, 03/22/2017, he suddenly developed some lower abdominal pain. The patient rates his pain as 8/10 in severity at its worst, and reports that since that time his pain has waxed and waned. The patient denies any radiation of this pain into his scrotum or back. The patient reports that today he had one small bowel movement which consisted of \"little pellets\" and with his abdominal pain the patient has had some dry heaves. He denies any urinary symptoms. The patient reports that his pain is similar to pain that he had several years ago when his \"stomach moved up into his diaphragm.\" At that time, the patient had a surgical procedure which resolved his pain. The patient normally receives his care at [facility].     Allergies:  No Known Drug Allergies    Medications:    Oxycodone  Tylenol  Norvasc  Lopressor  Zantac  Senokot  Seroquel  Albuterol     Past Medical History:    Hypertension     Past Surgical History:    Bronchoscopy Flexible  Esophagoscopy, gastroscopy, duodenoscopy (egd), combined    Eye Surgery  Hernia Repair  Laparoscopic assisted insertion tube gastrotomy   Laparoscopic herniorrhaphy giant paraesophageal    Family History:    No family history on file.    Social History:  The patient was accompanied to the ED by his wife.  Smoking Status: Never Smoker  Alcohol Use: Negative   Marital Status:   [2]     Review of Systems   Gastrointestinal: Positive for abdominal pain, constipation (Mild) and nausea. Negative for diarrhea.   Genitourinary: Negative for decreased urine volume, difficulty urinating, dysuria, frequency, hematuria, testicular pain and urgency.   Musculoskeletal: Negative for back pain.   All other systems reviewed and are negative.    Physical " "Exam   Vitals:  Patient Vitals for the past 24 hrs:   BP Temp Temp src Heart Rate Resp SpO2 Height Weight   03/23/17 0330 (!) 140/95 - - - - 93 % - -   03/23/17 0315 (!) 143/98 - - - - 94 % - -   03/23/17 0300 (!) 140/93 - - - - 92 % - -   03/23/17 0245 147/84 - - - - 94 % - -   03/23/17 0230 (!) 150/103 - - - - 95 % - -   03/23/17 0200 151/89 - - - - 96 % - -   03/23/17 0145 143/90 - - - - 95 % - -   03/23/17 0143 - - - - - 92 % - -   03/23/17 0142 - - - - - (!) 89 % - -   03/23/17 0130 143/84 - - - - 94 % - -   03/23/17 0115 (!) 139/93 - - - - 94 % - -   03/23/17 0100 (!) 135/94 - - - - - - -   03/23/17 0057 - 96.9  F (36.1  C) Temporal 70 18 96 % 1.727 m (5' 8\") 86.2 kg (190 lb)     Physical Exam    Nursing note and vitals reviewed.    Constitutional: Uncomfortable and ill appearing.          HENT:    Mouth/Throat: Oropharynx is without swelling or erythema. Oral mucosa moist.    Eyes: Conjunctivae normal are normal. No scleral icterus.    Neck: Neck supple.   Cardiovascular: Normal rate, regular rhythm and intact distal pulses.    Pulmonary/Chest: Effort normal and breath sounds normal.   Abdominal: Distended. Diffuse abdominal tenderness. Diminished abdominal sounds.   Genitourinary: No hernia palpable. No tenderness.   Musculoskeletal:  No edema, No calf tenderness  Neurological:Alert and answering questions appropriately. Coordination normal.   Skin: Skin is warm and dry. Pale appearing.   Psychiatric: Normal mood and affect.     Emergency Department Course     Imaging:  Radiology findings were communicated with the patient who voiced understanding of the findings.    CT Abdomen Pelvis with contrast  1. Mechanical small bowel obstruction. An internal hernia or other  closed loop obstruction cannot be excluded. Associated ascites and  moderate mesenteric stranding and edema. Recommend surgical  consultation.  2. Cholelithiasis.  3. Colonic diverticulosis.  MAURICIO PLASENCIA MD  Reading per radiology    POC US " Abdomen Limited  Unable to obtain adequate images due to pain and overlying bowel gas.     Laboratory:  Laboratory findings were communicated with the patient who voiced understanding of the findings.    CBC: WBC 17.0 (H), HGB 17.3,   CMP: Glucose 182 (H) o/w WNL (Creatinine 0.84)  Lactic Acid Whole Blood: 2.1  Lipase: 95     Interventions:  0131 Dilaudid 0.5 mg IV  0204 NS 1000 ml IV   0206 Dilaudid 0.5 mg IV    Emergency Department Course:  Nursing notes and vitals reviewed.  I performed an exam of the patient as documented above.   0131 I attempted to perform an ultrasound but the patient was very uncomfortable so the procedure was very limited.  The patient was sent for a CT Abdomen Pelvis with contrast and a POC US Abdomen Limited while in the emergency department, results above.   0328 I spoke with Dr. Marietta Landa of surgery regarding patient's presentation, findings, and plan of care.  I discussed the treatment plan with the patient. They expressed understanding of this plan and consented to admission. I discussed the patient with Dr. Hernandez, who will admit the patient to a monitored bed for further evaluation and treatment.  I personally reviewed the lab and imaging results with the patient and answered all related questions prior to admission.  Impression & Plan      Medical Decision Making:  Pablo Fuentes is a 76 year old male who presents for evaluation of vomiting and abdominal pain.  The differential diagnosis included but was not limited to bowel obstruction, pancreatitis, cholecystitis, biliary colic, ischemic bowel.     The clinical presentation is consistent with bowel obstruction which was confirmed by CT with findings of a closed loop obstruction and therefore Surgery was consulted.   There are no signs of other surgical emergencies.   Patient has been resuscitated with IVF. Symptoms have improved with antiemetics and parenteral pain medications and therefore an NG was not placed.   Cordell planned to meet the patient in the PAR and operate.     Dr. Landa later called back stating that his examination had improved considerably and that the patient preferred a trial of nonoperative management. She requested I speak with the hospitalist service about admission.      I have spoken with Dr. Hernandez who accepted the patient for admission for ongoing evaluation, monitoring and management.       Plan:   Admit to Dr. Hernandez for ongoing evaluation, monitoring and management.     Diagnosis:    ICD-10-CM    1. Small bowel obstruction (H) K56.69      Disposition:   The patient is admitted into the care of Dr. Hernandez.    Scribe Disclosure:  I, Tristen Suazo, am serving as a scribe at 1:21 AM on 3/23/2017 to document services personally performed by Monica Titus, based on my observations and the provider's statements to me.    3/23/2017   [facility] EMERGENCY DEPARTMENT       Monica Titus MD  03/24/17 0432

## 2017-03-23 NOTE — PROVIDER NOTIFICATION
Dr. Mcmahan at the bedside and orders received to give Labetalol 10 mg x1 for systolic blood pressures in the 200's. Will administer and continue to monitor.

## 2017-03-23 NOTE — IP AVS SNAPSHOT
[place] [id number] [facility]    [address]    [facility] [address]    Phone:  [phone]    Fax:  [phone]                                       After Visit Summary   3/23/2017    Pablo Fuentes    MRN: [id number]           After Visit Summary Signature Page     I have received my discharge instructions, and my questions have been answered. I have discussed any challenges I see with this plan with the nurse or doctor.    ..........................................................................................................................................  Patient/Patient Representative Signature      ..........................................................................................................................................  Patient Representative Print Name and Relationship to Patient    ..................................................               ................................................  Date                                            Time    ..........................................................................................................................................  Reviewed by Signature/Title    ...................................................              ..............................................  Date                                                            Time

## 2017-03-23 NOTE — H&P
HISTORY AND PHYSICAL     PRIMARY CARE PHYSICIAN:  Dr. Isaac Nunez.      CHIEF COMPLAINT:  Abdominal pain.      HISTORY OF PRESENT ILLNESS:  Pablo Fuentes is a 76-year-old male with a history of hypertension and incarcerated giant hiatal hernia requiring laparoscopic reduction with percutaneous endoscopic gastrostomy tube as well as bilateral thoracotomies and laparoscopic jejunostomy feeding tube placement due to a complicated postoperative hospitalization in which he was intubated for complications related to postoperative pneumonia and ICU delirium.  He developed abdominal pain at 7:00 p.m. on the night of the 22nd.  This was very sudden and diffuse pain.  He rates it as an 8/10.  He had multiple episodes of vomiting and persistent nausea.  He did have a bowel movement, in fact two bowel movements yesterday.  Due to these issues, he went to the urgent care.  His condition was concerning, so he was directed to the emergency department for evaluation.  Here, he was hemodynamically stable upon arrival.        VITAL SIGNS:  Temperature was 96.9, heart rate 70, blood pressure 135/94, respiratory rate 18 and oxygen saturation 96% on room air.      LABORATORY DATA:  normal Basic metabolic panel, liver function tests, lactate and lipase.  Glucose was 182, white blood cells 17.0.  Remainder of the CBC was unremarkable.  CT of the abdomen and pelvis showed mechanical small-bowel obstruction.  An Internal hernia or other closed loop obstruction cannot be excluded.  Associated ascites and moderate mesenteric stranding and edema were present as well.  Surgery was emergently consulted and took the patient to the operating room.  However, his symptoms abruptly abated and surgical intervention was canceled.  He was subsequently admitted to the Hospitalist Service for further cares.  Currently, the patient denies any headaches, vision changes, neck or back pain, chest pain, shortness of breath, cough, palpitations, nausea,  vomiting, diarrhea, abdominal pain, dysuria, hematuria or pyuria.  He has no other complaints at this time.      PAST MEDICAL HISTORY:   1.  Hypertension.   2.  Postoperative respiratory failure.   3.  Persistent Hospital-acquired delirium.   4.  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      PRIOR TO ADMISSION MEDICATIONS:   1.  Acetaminophen 1 gram p.o. t.i.d.   2.  Albuterol nebulizer 0.083% nebulized q.2h. p.r.n. shortness of breath.   3.  Amlodipine 5 mg p.o. daily.   4.  Metoprolol 25 mg p.o. b.i.d.   5.  Therapeutic multivitamin 1 tab p.o. daily.   6.  Oxycodone 2.5 mg q.4h. p.r.n. pain.   7.  Senokot 8.6 mg 1 tab p.o. b.i.d.      SOCIAL HISTORY:  The patient lives with his wife, who is here at the bedside.  He has no history of smoking, drinking or drug use.      FAMILY HISTORY:  Assessed and noncontributory.      ALLERGIES:  No known drug allergies.      REVIEW OF SYSTEMS:  A 10-point review of systems was performed and the pertinent positive findings are presented in history present illness.  Remainder review of systems is negative.      PHYSICAL EXAMINATION:   VITAL SIGNS:  Temperature 96.9, heart rate 70, blood pressure 135/94, respiratory rate 18, oxygen saturation 94% on room air.   GENERAL:  No apparent distress, awake, alert and oriented x3.   HEENT:  Normocephalic, atraumatic.  Extraocular movements are intact.   NECK:  Supple, without JVD.   CARDIOVASCULAR:  Regular rhythm, without murmurs, rubs or gallops.   PULMONARY:  Clear to auscultation bilaterally.   ABDOMEN:  Soft, nontender, nondistended, bowel sounds are present.   EXTREMITIES:  No cyanosis, clubbing or edema.   SKIN:  No rashes, ulcers or jaundice.   NEUROLOGICAL:  Cranial nerves II through XII are grossly intact.  No focal neurologic deficits.   PSYCHIATRIC:  Mood and  affect are appropriate.      LABORATORY, IMAGING AND PROCEDURES:  Personally reviewed and discussed pertinent findings noted in the HPI.      ASSESSMENT AND PLAN:  This is a 76-year-old male with a history of hypertension as well as complicated paraesophageal hernia repair requiring reduction complicated by malnutrition, delirium, postoperative respiratory failure and malnutrition requiring jejunostomy feeding tube placement now presenting to [facility] with abdominal pain and ultimately found to have a mechanical small-bowel obstruction.   1.  Mechanical small-bowel obstruction:  The patient is now entirely free of pain and nausea.  CT scan indicates that the cause could be a hernia.  In any event, he was nearly prepared to go to the operating room for intervention; however, his symptoms abruptly abated and now surgery has been canceled and he is admitted to the hospitalist service for further cares.  General Surgery consultation will be formally requested.  He will be n.p.o. on bowel rest with IV fluids.  Currently, I think we can avoid an NG tube as he is symptom-free.  Further cares per the Surgical Service.   2.  Hypertension:  Blood pressure is currently adequately controlled.  We will resume his Norvasc and metoprolol.   3.  The patient will be admitted to inpatient status with an expected 2 night hospitalization.      CODE STATUS:  Full code as discussed with the patient.         LISETH GOMEZ MD             D: 2017 05:31   T: 2017 06:31   MT: MADONNA#[id number]      Name:     LUH ZELAYA   MRN:      [id number]        Account:      [id number]   :      1940           Admitted:     [id number]      Document: [id number]

## 2017-03-23 NOTE — PROVIDER NOTIFICATION
Dr. Mcmahan notified of continued blood pressures with systolic readings in the 170-180's. Orders received for Labetalol 10 mg. Will administer and continue to monitor.

## 2017-03-23 NOTE — PHARMACY-ADMISSION MEDICATION HISTORY
Admission medication history interview status for this patient is complete. See [facility] admission navigator for allergy information, prior to admission medications and immunization status.     Medication history interview source(s):Patient and Family  Medication history resources (including written lists, pill bottles, clinic record): Epic list, med list from home  Primary pharmacy:[facility] on [address]    Changes made to PTA medication list:  Added: Vanquish, biotin, cranberry and Ocuvite.  Deleted: Tylenol, Albuterol neb, oxycodone and senna.  Changed: none    Actions taken by pharmacist (provider contacted, etc):None     Additional medication history information:None    Medication reconciliation/reorder completed by provider prior to medication history? Yes      Prior to Admission medications    Medication Sig Last Dose Taking? Auth Provider   ASA-APAP-Caff Buffered (VANQUISH) [phone] MG TABS Take 1 tablet by mouth 3 times daily as needed 3/22/2017 at Unknown time Yes Unknown, Entered By History   BIOTIN PO Take by mouth daily 3/22/2017 at Unknown time Yes Unknown, Entered By History   CRANBERRY PO Take by mouth daily 3/22/2017 at Unknown time Yes Unknown, Entered By History   Multiple Vitamins-Minerals (OCUVITE PO) Take by mouth daily 3/22/2017 at Unknown time Yes Unknown, Entered By History   AmLODIPine Besylate (NORVASC PO) Take 5 mg by mouth daily 3/22/2017 at Unknown time Yes Reported, Patient   Metoprolol Tartrate (LOPRESSOR PO) Take 25 mg by mouth 2 times daily 3/22/2017 at 2030 Yes Reported, Patient   multivitamin, therapeutic with minerals (THERA-VIT-M) TABS Take 1 tablet by mouth daily 3/22/2017 at Unknown time Yes Brad Santos, DELVIN

## 2017-03-23 NOTE — PLAN OF CARE
Problem: Goal Outcome Summary  Goal: Goal Outcome Summary  Outcome: No Change  Patient admitted to floor at 0500 with SBO after refusing surgical intervention. VSS. Afebrile. LS clear/diminished. BS faint, patient states he's passing gas. Last Bowel Movement, 3/22. Denies pain and nausea. A1 with walker to ambulate, can be a little unsteady. NPO. PIV - D5 1/2 with 20 KCl @ 100 ml/hr. Wife at bedside. Alarms on for safety. WBC 9.7, UA negative. DC uncertain.

## 2017-03-23 NOTE — ED NOTES
IN TRIAGE airway,breathing and circulation intact, without need for intervention . Alert and interacting appropriately for age and situation. Constant lower abdominal pain since 7 pm

## 2017-03-23 NOTE — ED NOTES
Pt cont in pain   Generalized abd  Pale in appearance  Warm dry   Wife attentive at bedside   Will repeat  Dilaudid awaiting ct  Rates pain still 6/10

## 2017-03-23 NOTE — IP AVS SNAPSHOT
MRN:[id number]                      After Visit Summary   3/23/2017    Pablo Fuentes    MRN: [id number]           Thank you!     Thank you for choosing [facility] for your care. Our goal is always to provide you with excellent care. Hearing back from our patients is one way we can continue to improve our services. Please take a few minutes to complete the written survey that you may receive in the mail after you visit. If you would like to speak to someone directly about your visit please contact Patient Relations at [phone]. Thank you!          Patient Information     Date Of Birth          1940        About your hospital stay     You were admitted on:  March 23, 2017 You last received care in the:  [place] [address] [facility]    You were discharged on:  March 26, 2017       Who to Call     For medical emergencies, please call 911.  For non-urgent questions about your medical care, please call your primary care provider or clinic, [phone]  For questions related to your surgery, please call your surgery clinic        Attending Provider     Provider Monica Ibarra MD Emergency Medicine    Tristen Hernandez DO Internal Medicine       Primary Care Provider Office Phone # Fax #    Isaac Nunez [phone]       [facility] [address] [facility] [address]        After Care Instructions     Activity       Your activity upon discharge: activity as tolerated and no driving while on analgesics            Diet       Follow this diet upon discharge: Regular                  Follow-up Appointments     Follow Up and recommended labs and tests       Follow up with primary care provider, Isaac Nunez, within 7 days for hospital follow- up.  No follow up labs or test are needed.  Follow up with Dr Garcia in 1-3 weeks.  The patient was provided with a limited supply of oral narcotic pain  medication.  The patient was instructed not to take the pain medication above the prescribed dose and frequency due to the potential for addiction, respiratory depression, and even death. The patient expressed understanding of these instructions.                  Further instructions from your care team       HOME CARE FOLLOWING ABDOMINAL SURGERY  TIERRA Morales, ADITYA Estrada, MAURICE Stoddard, DENISE Pearce     INCISIONAL CARE:  Replace the bandage over your incision (or incisions) until all drainage stops, or if more comfortable to have in place.  If present, leave the steri-strips (white paper tapes) in place till they fall off.  If you have staples in your incision at the time of discharge, they will be removed at your follow-up appointment.  If Dermabond (a type of skin glue) is present, leave in place until it wears/flakes off.     BATHING:  Avoid baths for 1 week after surgery.  Showers are okay.  You may wash your hair at any time.  Gently pat your incision dry after bathing.    ACTIVITY:  Light Activity -- you may immediately be up and about as tolerated.  Driving -- you may drive when comfortable and off narcotic pain medications.  Light Work -- resume when comfortable off pain medications.  (If you can drive, you probably can work.)  Strenuous Work/Activity -- limit lifting to 25 pounds for 6 weeks.  Then, progressively increase with time.  Active Sports (running, biking, etc.) -- cautiously resume after 6 weeks.    DISCOMFORT:  Use pain medications as prescribed by your surgeon.  Take the pain medication with some food, when possible, to minimize side effects.  Expect gradual improvement.    DIET:  Return to diet you were on before surgery, unless you are given specific diet instructions.  Drink plenty of fluids.  While taking pain medications, increase dietary fiber or add a fiber supplementation like Metamucil or Citrucel to help prevent constipation - a possible side effect of  pain medications.    NAUSEA:  If nauseated from the anesthetic/pain meds; rest in bed, get up cautiously with assistance, and drink clear liquids (juice, tea, broth).    RETURN APPOINTMENT:  Schedule a follow-up visit 1-3 weeks after discharge from the hospital.  Office Phone:  [phone]     CONTACT US IF THE FOLLOWING DEVELOPS:   1. A fever that is above 101     2. If there is a large amount of drainage, bleeding, or swelling.   3. Severe pain that is not relieved by your prescription.   4. Drainage that is thick, cloudy, yellow, green or white.   5. Any other questions not answered by  Frequently Asked Questions  sheet.      FREQUENTLY ASKED QUESTIONS:    Q:  How should my incision look?    A:  Normally your incision will appear slightly swollen with light redness directly along the incision itself as it heals.  It may feel like a bump or ridge as the healing/scarring happens, and over time (3-4 months) this bump or ridge feeling should slowly go away.  In general, clear or pink watery drainage can be normal at first as your incision heals, but should decrease over time.    Q:  How do I know if my incision is infected?  A:  Look at your incision for signs of infection, like redness around the incision spreading to surrounding skin, or drainage of cloudy or foul-smelling drainage.  If you feel warm, check your temperature to see if you are running a fever.    **If any of these things occur, please notify the nurse at our office.  We may need you to come into the office for an incision check.      Q:  How do I take care of my incision?  A:  If you have a dressing in place - Starting the day after surgery, replace the dressing 1-2 times a day until there is no further drainage from the incision.  At that time, a dressing is no longer needed.  Try to minimize tape on the skin if irritation is occurring at the tape sites.  If you have significant irritation from tape on the skin, please call the office to discuss  other method of dressing your incision.    Small pieces of tape called  steri-strips  may be present directly overlying your incision; these may be removed 10 days after surgery unless otherwise specified by your surgeon.  If these tapes start to loosen at the ends, you may trim them back until they fall off or are removed.    A:  If you had  Dermabond  tissue glue used as a dressing (this causes your incision to look shiny with a clear covering over it) - This type of dressing wears off with time and does not require more dressings over the top unless it is draining around the glue as it wears off.  Do not apply ointments or lotions over the incisions until the glue has completely worn off.    Q:  There is a piece of tape or a sticky  lead  still on my skin.  Can I remove this?  A:  Sometimes the sticky  leads  used for monitoring during surgery or for evaluation in the emergency department are not all removed while you are in the hospital.  These sometimes have a tab or metal dot on them.  You can easily remove these on your own, like taking off a band-aid.  If there is a gel substance under the  lead , simply wipe/clean it off with a washcloth or paper towel.      Q:  What can I do to minimize constipation (very hard stools, or lack of stools)?  A:  Stay well hydrated.  Increase your dietary fiber intake or take a fiber supplement -with plenty of water.  Walk around frequently.  You may consider an over-the-counter stool-softener.  Your Pharmacist can assist you with choosing one that is stocked at your pharmacy.  Constipation is also one of the most common side effects of pain medication.  If you are using pain medication, be pro-active and try to PREVENT problems with constipation by taking the steps above BEFORE constipation becomes a problem.    Q:  What do I do if I need more pain medications?  A:  Call the office to receive refills.  Be aware that certain pain meds cannot be called into a pharmacy and  actually require a paper prescription.  A change may be made in your pain med as you progress thru your recovery period or if you have side effects to certain meds.    --Pain meds are NOT refilled after 5pm on weekdays, and NOT AT ALL on the weekends, so please look ahead to prevent problems.      Q:  Why am I having a hard time sleeping now that I am at home?  A:  Many medications you receive while you are in the hospital can impact your sleep for a number of days after your surgery/hospitalization.  Decreased level of activity and naps during the day may also make sleeping at night difficult.  Try to minimize day-time naps, and get up frequently during the day to walk around your home during your recovery time.  Sleep aides may be of some help, but are not recommended for long-term use.      Q:  I am having some back discomfort.  What should I do?  A:  This may be related to certain positioning that was required for your surgery, extended periods of time in bed, or other changes in your overall activity level.  You may try ice, heat, acetaminophen, or ibuprofen to treat this temporarily.  Note that many pain medications have acetaminophen in them and would state this on the prescription bottle.  Be sure not to exceed the maximum of 4000mg per day of acetaminophen.     **If the pain you are having does not resolve, is severe, or is a flare of back pain you have had on other occasions prior to surgery, please contact your primary physician for further recommendations or for an appointment to be examined at their office.    Q:  Why am I having headaches?  A:  Headaches can be caused by many things:  caffeine withdrawal, use of pain meds, dehydration, high blood pressure, lack of sleep, over-activity/exhaustion, flare-up of usual migraine headaches.  If you feel this is related to muscle tension (a band-like feeling around the head, or a pressure at the low-back of the head) you may try ice or heat to this area.  You  may need to drink more fluids (try electrolyte drink like Gatorade), rest, or take your usual migraine medications.   **If your headaches do not resolve, worsen, are accompanied by other symptoms, or if your blood pressure is high, please call your primary physician for recommendation and/or examination.    Q:  I am unable to urinate.  What do I do?  A:  A small percentage of people can have difficulty urinating initially after surgery.  This includes being able to urinate only a very small amount at a time and feeling discomfort or pressure in the very low abdomen.  This is called  urinary retention , and is actually an urgent situation.  Proceed to your nearest Emergency department for evaluation (not an Urgent Care Center).  Sometimes the bladder does not work correctly after certain medications you receive during surgery, or related to certain procedures.  You may need to have a catheter placed until your bladder recovers.  When planning to go to an Emergency department, it may help to call the ER to let them know you are coming in for this problem after a surgery.  This may help you get in quicker to be evaluated.  **If you have symptoms of a urinary tract infection, please contact your primary physician for the proper evaluation and treatment.          If you have other questions, please call the office Monday thru Friday between 8am and 5pm to discuss with the nurse or physician assistant.  #[phone]    There is a surgeon ON CALL on weekday evenings and over the weekend in case of urgent need only, and may be contacted at the same number.    If you are having an emergency, call 911 or proceed to your nearest emergency department.        Pending Results     Date and Time Order Name Status Description    3/23/2017 0121 POC US ABDOMEN LIMITED In process             Statement of Approval     Ordered          03/26/17 0948  I have reviewed and agree with all the recommendations and orders detailed in this  "document.  EFFECTIVE NOW     Approved and electronically signed by:  Marietta Landa MD           17 0957  I have reviewed and agree with all the recommendations and orders detailed in this document.  EFFECTIVE NOW     Approved and electronically signed by:  Tristen Hernandez DO             Admission Information     Date & Time Provider Department Dept. Phone    3/23/2017 Tristen Hernandez DO [place] [address] [facility] [phone]      Your Vitals Were     Blood Pressure Pulse Temperature Respirations Height Weight    137/96 (BP Location: Left arm) 57 96.2  F (35.7  C) (Oral) 16 1.727 m (5' 8\") 83.6 kg (184 lb 4.8 oz)    Pulse Oximetry BMI (Body Mass Index)                92% 28.02 kg/m2          MyChart Information     [employer] lets you send messages to your doctor, view your test results, renew your prescriptions, schedule appointments and more. To sign up, go to [email or web address] . Click on \"Log in\" on the left side of the screen, which will take you to the Welcome page. Then click on \"Sign up Now\" on the right side of the page.     You will be asked to enter the access code listed below, as well as some personal information. Please follow the directions to create your username and password.     Your access code is: [email or web address]  Expires: 2017 10:43 AM     Your access code will  in 90 days. If you need help or a new code, please call your [facility] clinic or [phone].        Care EveryWhere ID     This is your Care EveryWhere ID. This could be used by other organizations to access your [facility] medical records  [id number]           Review of your medicines      START taking        Dose / Directions    acetaminophen 500 MG tablet   Commonly known as:  TYLENOL   Used for:  Small bowel obstruction (H)        Dose:  500-1000 mg   Take 1-2 tablets (500-1,000 mg) by mouth every 6 hours as needed (pain)   Quantity:  50 tablet   Refills:  1       HYDROcodone-acetaminophen " 5-325 MG per tablet   Commonly known as:  NORCO   Used for:  Small bowel obstruction (H)        Dose:  1-2 tablet   Take 1-2 tablets by mouth every 4 hours as needed for moderate to severe pain   Quantity:  20 tablet   Refills:  0         CONTINUE these medicines which have NOT CHANGED        Dose / Directions    BIOTIN PO        Take by mouth daily   Refills:  0       CRANBERRY PO        Take by mouth daily   Refills:  0       LOPRESSOR PO        Dose:  25 mg   Take 25 mg by mouth 2 times daily   Refills:  0       * OCUVITE PO        Take by mouth daily   Refills:  0       * multivitamin, therapeutic with minerals Tabs tablet   Used for:  Malnutrition (H)        Dose:  1 tablet   Take 1 tablet by mouth daily   Quantity:  30 each   Refills:  0       NORVASC PO        Dose:  5 mg   Take 5 mg by mouth daily   Refills:  0       VANQUISH 227-194-33 MG Tabs   Generic drug:  ASA-APAP-Caff Buffered        Dose:  1 tablet   Take 1 tablet by mouth 3 times daily as needed   Refills:  0       * Notice:  This list has 2 medication(s) that are the same as other medications prescribed for you. Read the directions carefully, and ask your doctor or other care provider to review them with you.         Where to get your medicines      Some of these will need a paper prescription and others can be bought over the counter. Ask your nurse if you have questions.     Bring a paper prescription for each of these medications     HYDROcodone-acetaminophen 5-325 MG per tablet       You don't need a prescription for these medications     acetaminophen 500 MG tablet                Protect others around you: Learn how to safely use, store and throw away your medicines at www.disposemymeds.org.             Medication List: This is a list of all your medications and when to take them. Check marks below indicate your daily home schedule. Keep this list as a reference.      Medications           Morning Afternoon Evening Bedtime As Needed     acetaminophen 500 MG tablet   Commonly known as:  TYLENOL   Take 1-2 tablets (500-1,000 mg) by mouth every 6 hours as needed (pain)   Last time this was given:  1,000 mg on 3/26/2017  3:10 AM                                BIOTIN PO   Take by mouth daily                                CRANBERRY PO   Take by mouth daily                                HYDROcodone-acetaminophen 5-325 MG per tablet   Commonly known as:  NORCO   Take 1-2 tablets by mouth every 4 hours as needed for moderate to severe pain                                LOPRESSOR PO   Take 25 mg by mouth 2 times daily   Last time this was given:  25 mg on 3/26/2017  8:40 AM                                * OCUVITE PO   Take by mouth daily                                * multivitamin, therapeutic with minerals Tabs tablet   Take 1 tablet by mouth daily                                NORVASC PO   Take 5 mg by mouth daily   Last time this was given:  5 mg on 3/26/2017  8:40 AM                                VANQUISH [id number] MG Tabs   Take 1 tablet by mouth 3 times daily as needed   Generic drug:  ASA-APAP-Caff Buffered                                * Notice:  This list has 2 medication(s) that are the same as other medications prescribed for you. Read the directions carefully, and ask your doctor or other care provider to review them with you.

## 2017-03-23 NOTE — ANESTHESIA PREPROCEDURE EVALUATION
Anesthesia Evaluation     . Pt has had prior anesthetic. Type: General           ROS/MED HX    ENT/Pulmonary:  - neg pulmonary ROS     Neurologic:  - neg neurologic ROS     Cardiovascular:     (+) hypertension----. : . . . :. .       METS/Exercise Tolerance:     Hematologic:  - neg hematologic  ROS       Musculoskeletal:  - neg musculoskeletal ROS       GI/Hepatic:     (+) Other GI/Hepatic SBO      Renal/Genitourinary:  - ROS Renal section negative       Endo:  - neg endo ROS       Psychiatric:  - neg psychiatric ROS       Infectious Disease:  - neg infectious disease ROS       Malignancy:      - no malignancy   Other:    (+) No chance of pregnancy C-spine cleared: N/A, no H/O Chronic Pain,no other significant disability   - neg other ROS                 Physical Exam  Normal systems: cardiovascular, pulmonary and dental    Airway   Mallampati: I  TM distance: >3 FB  Neck ROM: full    Dental     Cardiovascular       Pulmonary                     Anesthesia Plan      History & Physical Review  History and physical reviewed and following examination; no interval change.    ASA Status:  2 .    NPO Status:  > 8 hours    Plan for General and ETT with Intravenous induction. Maintenance will be Balanced.    PONV prophylaxis:  Ondansetron (or other 5HT-3) and Dexamethasone or Solumedrol       Postoperative Care  Postoperative pain management:  IV analgesics.      Consents  Anesthetic plan, risks, benefits and alternatives discussed with:  Patient.  Use of blood products discussed: Yes.   Use of blood products discussed with Patient.  Consented to blood products.  .                          .

## 2017-03-24 LAB
ANION GAP SERPL CALCULATED.3IONS-SCNC: 8 MMOL/L (ref 3–14)
BUN SERPL-MCNC: 14 MG/DL (ref 7–30)
CALCIUM SERPL-MCNC: 8 MG/DL (ref 8.5–10.1)
CHLORIDE SERPL-SCNC: 107 MMOL/L (ref 94–109)
CO2 SERPL-SCNC: 26 MMOL/L (ref 20–32)
CREAT SERPL-MCNC: 0.8 MG/DL (ref 0.66–1.25)
ERYTHROCYTE [DISTWIDTH] IN BLOOD BY AUTOMATED COUNT: 12.1 % (ref 10–15)
GFR SERPL CREATININE-BSD FRML MDRD: ABNORMAL ML/MIN/1.7M2
GLUCOSE SERPL-MCNC: 95 MG/DL (ref 70–99)
HCT VFR BLD AUTO: 42.5 % (ref 40–53)
HGB BLD-MCNC: 14.6 G/DL (ref 13.3–17.7)
MAGNESIUM SERPL-MCNC: 2.1 MG/DL (ref 1.6–2.3)
MCH RBC QN AUTO: 34.3 PG (ref 26.5–33)
MCHC RBC AUTO-ENTMCNC: 34.4 G/DL (ref 31.5–36.5)
MCV RBC AUTO: 100 FL (ref 78–100)
PLATELET # BLD AUTO: 157 10E9/L (ref 150–450)
POTASSIUM SERPL-SCNC: 4.6 MMOL/L (ref 3.4–5.3)
RBC # BLD AUTO: 4.26 10E12/L (ref 4.4–5.9)
SODIUM SERPL-SCNC: 141 MMOL/L (ref 133–144)
WBC # BLD AUTO: 9.1 10E9/L (ref 4–11)

## 2017-03-24 PROCEDURE — 99232 SBSQ HOSP IP/OBS MODERATE 35: CPT | Performed by: INTERNAL MEDICINE

## 2017-03-24 PROCEDURE — 83735 ASSAY OF MAGNESIUM: CPT | Performed by: INTERNAL MEDICINE

## 2017-03-24 PROCEDURE — 12000000 ZZH R&B MED SURG/OB

## 2017-03-24 PROCEDURE — 85027 COMPLETE CBC AUTOMATED: CPT | Performed by: INTERNAL MEDICINE

## 2017-03-24 PROCEDURE — 25000125 ZZHC RX 250: Performed by: PHYSICIAN ASSISTANT

## 2017-03-24 PROCEDURE — 25000132 ZZH RX MED GY IP 250 OP 250 PS 637: Mod: GY | Performed by: HOSPITALIST

## 2017-03-24 PROCEDURE — 25000125 ZZHC RX 250: Performed by: HOSPITALIST

## 2017-03-24 PROCEDURE — A9270 NON-COVERED ITEM OR SERVICE: HCPCS | Mod: GY | Performed by: HOSPITALIST

## 2017-03-24 PROCEDURE — 80048 BASIC METABOLIC PNL TOTAL CA: CPT | Performed by: INTERNAL MEDICINE

## 2017-03-24 PROCEDURE — 25800025 ZZH RX 258: Performed by: HOSPITALIST

## 2017-03-24 PROCEDURE — 36415 COLL VENOUS BLD VENIPUNCTURE: CPT | Performed by: INTERNAL MEDICINE

## 2017-03-24 PROCEDURE — S0028 INJECTION, FAMOTIDINE, 20 MG: HCPCS | Performed by: HOSPITALIST

## 2017-03-24 RX ORDER — ACETAMINOPHEN 10 MG/ML
1000 INJECTION, SOLUTION INTRAVENOUS EVERY 8 HOURS
Status: DISCONTINUED | OUTPATIENT
Start: 2017-03-24 | End: 2017-03-25

## 2017-03-24 RX ADMIN — METOPROLOL TARTRATE 25 MG: 25 TABLET, FILM COATED ORAL at 19:50

## 2017-03-24 RX ADMIN — ACETAMINOPHEN 1000 MG: 10 INJECTION, SOLUTION INTRAVENOUS at 17:40

## 2017-03-24 RX ADMIN — POTASSIUM CHLORIDE, DEXTROSE MONOHYDRATE AND SODIUM CHLORIDE: 150; 5; 450 INJECTION, SOLUTION INTRAVENOUS at 14:10

## 2017-03-24 RX ADMIN — FAMOTIDINE 20 MG: 10 INJECTION, SOLUTION INTRAVENOUS at 05:57

## 2017-03-24 RX ADMIN — METOPROLOL TARTRATE 25 MG: 25 TABLET, FILM COATED ORAL at 09:49

## 2017-03-24 RX ADMIN — ACETAMINOPHEN 1000 MG: 10 INJECTION, SOLUTION INTRAVENOUS at 11:27

## 2017-03-24 RX ADMIN — FAMOTIDINE 20 MG: 10 INJECTION, SOLUTION INTRAVENOUS at 17:40

## 2017-03-24 RX ADMIN — AMLODIPINE BESYLATE 5 MG: 5 TABLET ORAL at 09:49

## 2017-03-24 NOTE — PROGRESS NOTES
"[facility]  Hospitalist Progress Note  Janusz Soares MD 03/24/2017    Reason for Stay (Diagnosis): SBO         Assessment and Plan:      Summary of Stay: Pablo Fuentes is a 76 year old male with  History of HTN and incarcerated hernia, presented with abdominal pain and admitted to to hospital with SBO, on 3/23/2017.    Problem List:   1. Mechanical SBO, POD #1  S/p lap lysis of adhesion- pain controlled, no nausea or vomiting. No gas, no BM. NPO, monitor lytes. IVF.  2. HTN- controlled. Place parameters for BP.  3. Leukocytosis- reactive normalized.    DVT Prophylaxis: Pneumatic Compression Devices  Code Status: Full Code  Discharge Dispo: Home  Estimated Disch Date / # of Days until Disch: YTD, pending return of bowel function.  I discussed at length with patient his wife and nursing staff.  Discussed with surgery, Dr. Garcia.      Interval History (Subjective):      Patient seen and examined, feels better, no nausea, no gas passed. Sitting on a chair, wife at bedside.                  Physical Exam:      Last Vital Signs:  /72 (BP Location: Left arm)  Pulse 64  Temp 97.5  F (36.4  C) (Axillary)  Resp 16  Ht 1.727 m (5' 8\")  Wt 88 kg (194 lb 1.6 oz)  SpO2 94%  BMI 29.51 kg/m2    I/O last 3 completed shifts:  In: 2107 [I.V.:2107]  Out: 952 [Urine:950; Blood:2]  Wt Readings from Last 1 Encounters:   03/23/17 88 kg (194 lb 1.6 oz)       Constitutional: Awake, alert, cooperative, no apparent distress   Respiratory: Clear to auscultation bilaterally, no crackles or wheezing   Cardiovascular: Regular rate and rhythm, normal S1 and S2, and no murmur noted   Abdomen: Hypoactive BS, soft, non-distended, non-tender   Skin: No rashes, no cyanosis, dry to touch   Neuro: Alert and oriented x3, no weakness, numbness, memory loss   Extremities: No edema, normal range of motion   Other(s):        All other systems: Negative          Medications:      All current medications were reviewed with " changes reflected in problem list.         Data:      All new lab and imaging data was reviewed.   Labs:    Recent Labs  Lab 03/24/17  1126 03/23/17  0543 03/23/17  0115    141 140   POTASSIUM 4.6 4.7 3.8   CHLORIDE 107 106 102   CO2 26 28 26   ANIONGAP 8 7 12   GLC 95 140* 182*   BUN 14 23 27   CR 0.80 0.76 0.84   GFRESTIMATED >90Non  GFR Calc >90Non  GFR Calc 88   GFRESTBLACK >90African American GFR Calc >90African American GFR Calc >90African American GFR Calc   JOANN 8.0* 8.4* 9.0       Recent Labs  Lab 03/24/17  1126 03/23/17  0543 03/23/17  0115   WBC 9.1 9.7 17.0*   HGB 14.6 16.7 17.3   HCT 42.5 48.3 49.7    97 97    173 216       Recent Labs  Lab 03/24/17  1126 03/23/17  0543 03/23/17  0115    141 140   POTASSIUM 4.6 4.7 3.8   CHLORIDE 107 106 102   CO2 26 28 26   ANIONGAP 8 7 12   GLC 95 140* 182*   BUN 14 23 27   CR 0.80 0.76 0.84   GFRESTIMATED >90Non  GFR Calc >90Non  GFR Calc 88   GFRESTBLACK >90African American GFR Calc >90African American GFR Calc >90African American GFR Calc   JOANN 8.0* 8.4* 9.0   PROTTOTAL  --   --  7.1   ALBUMIN  --   --  4.1   BILITOTAL  --   --  0.8   ALKPHOS  --   --  98   AST  --   --  21   ALT  --   --  25      Imaging:   Recent Results (from the past 48 hour(s))   CT Abdomen Pelvis w Contrast    Narrative    CT ABDOMEN PELVIS W CONTRAST  3/23/2017 2:22 AM     HISTORY: Abdominal pain. Lower abdominal pain. History of hernia  repair.    TECHNIQUE: Volumetric acquisition through abdomen and pelvis with I.V.  contrast. 95 mL Isovue-370. Radiation dose for this scan was reduced  using automated exposure control, adjustment of the mA and/or kV  according to patient size, or iterative reconstruction technique.    COMPARISON: 2/10/2015.    FINDINGS: Multiple large gallstones in a mildly distended gallbladder.  No pericholecystic inflammation. There are a few tiny hypodensities in  the liver which  are too small to characterize but are probably tiny  cysts. Pancreas, spleen, adrenal glands and kidneys demonstrate no  worrisome findings. Repair of the previously seen large hiatal hernia.  Again seen is a prominent duodenal diverticulum adjacent to the  pancreatic head.    Multiple fluid-filled dilated small bowel loops with decompressed  distal small bowel compatible with mechanical obstruction. There  appears to be an abrupt transition site in the left anterior pelvis.  Configuration of other bowel loops in this region suggests the  possibility that this is an internal hernia or other closed loop  obstruction. There is moderate fat stranding and mesenteric edema  adjacent to some of these loops in the left abdomen. Colonic  diverticulosis most prominent in the sigmoid colon. Small amount of  ascites. No free air.      Impression    IMPRESSION:  1. Mechanical small bowel obstruction. An internal hernia or other  closed loop obstruction cannot be excluded. Associated ascites and  moderate mesenteric stranding and edema. Recommend surgical  consultation.  2. Cholelithiasis.  3. Colonic diverticulosis.    MAURICIO PLASENCIA MD

## 2017-03-24 NOTE — PLAN OF CARE
Problem: Goal Outcome Summary  Goal: Goal Outcome Summary  Outcome: Improving     A/O x4, up with A1 walker and gaitbelt. Pain managed with IV tylenol. IVF infusing. NPO. On RA. Up in chair and ambulated halls this shift. Voiding spont. Lap sites WDL. - gas, hypo BS. Will continue to monitor.

## 2017-03-24 NOTE — PLAN OF CARE
Problem: Goal Outcome Summary  Goal: Goal Outcome Summary  Outcome: Improving  Ambulatory Status:  Pt up with Ax1 with walker.  VS:  Stable, afebrile  Pain:  denies  Resp: LS diminished on room air  Cardiac: WDL  GI:  denies nausea. On NPO with ice chips diet.  BS hypo/faint. not Passing flatus.  Last BM 3/23/17.  :  Denies concerns, using urinal at bedside  Skin:  4 lap sites on abd, steri strips intact with dried drainage, right outer most steri strip has some erythema around lap site- redness marked and note left for MD team.  Tx:  Ofrimev, pepcid, IVF  Labs:  K 4.6, Mag 2.1, Na 141, WBC 9.1, Hgb 14.6.  Consults:  Surgery  Disposition:  TBD- pending bowel return, will continue to monitor.

## 2017-03-24 NOTE — PLAN OF CARE
Problem: Perioperative Period (Adult)  Goal: Signs and Symptoms of Listed Potential Problems Will be Absent or Manageable (Perioperative Period)  Signs and symptoms of listed potential problems will be absent or manageable by discharge/transition of care (reference Perioperative Period (Adult) CPG).  Outcome: Improving  HR jing, other VSS on 3LPM; decreased to 2 LPM, SpO2 low-mid 90's while asleep. Denies pain, nausea, no PRNs given overnight. NPO overnight, PIV infusing at 100 ml/hr. Lap sites with steristrips, scant amount of dried drainage present. BS+, not yet passing flatus. Tolliver in place with good UO. Tolliver DC'd at 0600, due to void by 1200; pt, wife aware. Pt performing IS with encouragement. Offered to assist pt to chair, pt refused this shift. Bed alarms in use, pt not attempting to exit bed. Alert and oriented, able to make needs known. Continue to monitor.

## 2017-03-24 NOTE — PROGRESS NOTES
"[facility]   General Surgery Progress Note           Assessment and Plan:   Assessment:   -POD#1 s/p Diagnostic Laparoscopy and Laparoscopic Lysis of Adhesions; bowel viable, no resection needed  -Expected postoperative ileus  -Hypoxia, O2 supplementation      Plan:   -Pain management: add scheduled ofirmev, IV dilaudid prn  -NPO except ice chips, monitor for nausea  -Prophylaxis: IV pepcid, PCDs  -Activity as tolerated today  -Await return of bowel function         Interval History:   Comfortable in bed, mild right abdominal pain (area of incisions).  No nausea, tolerating ice chips.  Bothered by O2 NC.  Voiding.  Minimal activity so far, notes significant bilateral knee arthritis/pain which may hinder activity.  Walker available in room.         Physical Exam:   Blood pressure 128/72, pulse 64, temperature 97.5  F (36.4  C), temperature source Axillary, resp. rate 16, height 1.727 m (5' 8\"), weight 88 kg (194 lb 1.6 oz), SpO2 94 %.    I/O last 3 completed shifts:  In: 2107 [I.V.:2107]  Out: 952 [Urine:950; Blood:2]    Abdomen:   soft, non-distended, tenderness noted right abdomen and hypoactive bowel sounds   Incs - clean, dry, steri-strips intact            Data:     Recent Labs  Lab 03/23/17  0543 03/23/17  0115   WBC 9.7 17.0*   HGB 16.7 17.3   HCT 48.3 49.7   MCV 97 97    216       Recent Labs  Lab 03/23/17  0543 03/23/17  0115    140   POTASSIUM 4.7 3.8   CHLORIDE 106 102   CO2 28 26   ANIONGAP 7 12   * 182*   BUN 23 27   CR 0.76 0.84   GFRESTIMATED >90Non  GFR Calc 88   GFRESTBLACK >90African American GFR Calc >90African American GFR Calc   JOANN 8.4* 9.0   PROTTOTAL  --  7.1   ALBUMIN  --  4.1   BILITOTAL  --  0.8   ALKPHOS  --  98   AST  --  21   ALT  --  25     Genna Villasenor PA-C     Seen and agree,    Ramón Garcia MD  [facility]    "

## 2017-03-24 NOTE — PLAN OF CARE
Problem: Goal Outcome Summary  Goal: Goal Outcome Summary  Outcome: No Change  POD 0 lap KRISTIE. 4 lap sites w/ steri strips. Bottom right lap site w/ some bleeding. Ice applied to abdomen. Vitals stable, afebrile. Weaned to 3L oxygen via NC, 98%. Tolliver catheter in place putting out clear yellow urine. Pt is alert/oriented, wife is at bedside. PCD's on. PIV-D5 1/2NS +20KCL infusing at 100ml/hr. NPO w/ ice and meds. IV zofran x1 for nausea.Plan: continue to monitor and provide supportive care.

## 2017-03-25 ENCOUNTER — APPOINTMENT (OUTPATIENT)
Dept: PHYSICAL THERAPY | Facility: CLINIC | Age: 77
DRG: 336 | End: 2017-03-25
Attending: SURGERY
Payer: MEDICARE

## 2017-03-25 LAB
ANION GAP SERPL CALCULATED.3IONS-SCNC: 7 MMOL/L (ref 3–14)
BUN SERPL-MCNC: 11 MG/DL (ref 7–30)
CALCIUM SERPL-MCNC: 8 MG/DL (ref 8.5–10.1)
CHLORIDE SERPL-SCNC: 107 MMOL/L (ref 94–109)
CO2 SERPL-SCNC: 29 MMOL/L (ref 20–32)
CREAT SERPL-MCNC: 0.82 MG/DL (ref 0.66–1.25)
GFR SERPL CREATININE-BSD FRML MDRD: ABNORMAL ML/MIN/1.7M2
GLUCOSE SERPL-MCNC: 82 MG/DL (ref 70–99)
POTASSIUM SERPL-SCNC: 4.1 MMOL/L (ref 3.4–5.3)
SODIUM SERPL-SCNC: 143 MMOL/L (ref 133–144)

## 2017-03-25 PROCEDURE — 97530 THERAPEUTIC ACTIVITIES: CPT | Mod: GP

## 2017-03-25 PROCEDURE — 80048 BASIC METABOLIC PNL TOTAL CA: CPT | Performed by: INTERNAL MEDICINE

## 2017-03-25 PROCEDURE — 36415 COLL VENOUS BLD VENIPUNCTURE: CPT | Performed by: INTERNAL MEDICINE

## 2017-03-25 PROCEDURE — 12000000 ZZH R&B MED SURG/OB

## 2017-03-25 PROCEDURE — 25000125 ZZHC RX 250: Performed by: PHYSICIAN ASSISTANT

## 2017-03-25 PROCEDURE — A9270 NON-COVERED ITEM OR SERVICE: HCPCS | Mod: GY | Performed by: HOSPITALIST

## 2017-03-25 PROCEDURE — 40000193 ZZH STATISTIC PT WARD VISIT

## 2017-03-25 PROCEDURE — 25000132 ZZH RX MED GY IP 250 OP 250 PS 637: Mod: GY | Performed by: INTERNAL MEDICINE

## 2017-03-25 PROCEDURE — 25800025 ZZH RX 258: Performed by: HOSPITALIST

## 2017-03-25 PROCEDURE — A9270 NON-COVERED ITEM OR SERVICE: HCPCS | Mod: GY | Performed by: INTERNAL MEDICINE

## 2017-03-25 PROCEDURE — S0028 INJECTION, FAMOTIDINE, 20 MG: HCPCS | Performed by: HOSPITALIST

## 2017-03-25 PROCEDURE — 99232 SBSQ HOSP IP/OBS MODERATE 35: CPT | Performed by: INTERNAL MEDICINE

## 2017-03-25 PROCEDURE — 25000125 ZZHC RX 250: Performed by: HOSPITALIST

## 2017-03-25 PROCEDURE — 25000132 ZZH RX MED GY IP 250 OP 250 PS 637: Mod: GY | Performed by: HOSPITALIST

## 2017-03-25 PROCEDURE — 97116 GAIT TRAINING THERAPY: CPT | Mod: GP

## 2017-03-25 PROCEDURE — 97161 PT EVAL LOW COMPLEX 20 MIN: CPT | Mod: GP

## 2017-03-25 RX ORDER — ACETAMINOPHEN 500 MG
1000 TABLET ORAL EVERY 8 HOURS
Status: DISCONTINUED | OUTPATIENT
Start: 2017-03-25 | End: 2017-03-26

## 2017-03-25 RX ADMIN — ACETAMINOPHEN 1000 MG: 10 INJECTION, SOLUTION INTRAVENOUS at 02:08

## 2017-03-25 RX ADMIN — METOPROLOL TARTRATE 25 MG: 25 TABLET, FILM COATED ORAL at 09:07

## 2017-03-25 RX ADMIN — RANITIDINE HYDROCHLORIDE 150 MG: 150 TABLET, FILM COATED ORAL at 21:04

## 2017-03-25 RX ADMIN — METOPROLOL TARTRATE 25 MG: 25 TABLET, FILM COATED ORAL at 21:04

## 2017-03-25 RX ADMIN — POTASSIUM CHLORIDE, DEXTROSE MONOHYDRATE AND SODIUM CHLORIDE: 150; 5; 450 INJECTION, SOLUTION INTRAVENOUS at 00:28

## 2017-03-25 RX ADMIN — FAMOTIDINE 20 MG: 10 INJECTION, SOLUTION INTRAVENOUS at 06:25

## 2017-03-25 RX ADMIN — AMLODIPINE BESYLATE 5 MG: 5 TABLET ORAL at 09:08

## 2017-03-25 RX ADMIN — ACETAMINOPHEN 1000 MG: 500 TABLET, FILM COATED ORAL at 18:23

## 2017-03-25 RX ADMIN — ACETAMINOPHEN 1000 MG: 500 TABLET, FILM COATED ORAL at 10:56

## 2017-03-25 NOTE — PLAN OF CARE
Problem: Goal Outcome Summary  Goal: Goal Outcome Summary  PT orders received and eval complete. Pt previously modified independent with mobility at baseline with use of FWW; mobility and endurance limited by bilateral knee pain. Currently SBA or less with all mobility but requiring cues for sequencing and technique to maintain abdominal precautions. Recommend return home with assist from spouse at discharge.

## 2017-03-25 NOTE — PROGRESS NOTES
"/84 (BP Location: Left arm)  Pulse 64  Temp 98.7  F (37.1  C) (Oral)  Resp 16  Ht 1.727 m (5' 8\")  Wt 88 kg (194 lb 1.6 oz)  SpO2 95%  BMI 29.51 kg/m2   Admitted- 3/23/17 SBO had S/p lap lysis of adhesion and is POD #1  Hx-HTN and incarcerated hernia  Code- Full  A/O x4 but forgetful  Lung Sound- clear and diminished in lower lobes on 1 L NC  Heart tones-WNL Tele-none   Edema-none  GI- BS-faint Flatus-yes- Nausea-none BM 3/23  Tolerating NPO and ice chips Diet  - voiding well in the urinal  Surgical site- lap sites with adhesive strips erythema present and outlined on the bottom R  IV-D5 1/2 20 @100  Pain-denies pain  Activity-A1 RW  Labs- on k and mg protocol k 4.6 mg 2.1  DC plan-pending bowel function      "

## 2017-03-25 NOTE — PROGRESS NOTES
03/25/17 1300   Quick Adds   Type of Visit Initial PT Evaluation   Living Environment   Lives With spouse   Living Arrangements house  (split level home)   Home Accessibility stairs to enter home;stairs (2 railings present);stairs within home;grab bars present (bathtub)   Number of Stairs to Enter Home 1   Number of Stairs Within Home 12  (6 up / 6 downstairs)   Stair Railings at Home inside, present at both sides   Transportation Available car;family or friend will provide   Living Environment Comment Spouse avaliable for assist as needed   Self-Care   Usual Activity Tolerance moderate   Current Activity Tolerance poor   Regular Exercise no   Activity/Exercise/Self-Care Comment Sedentary at baseline with use of FWW for mobility; limited by bilateral knee pain.   Functional Level Prior   Ambulation 1-->assistive equipment   Transferring 1-->assistive equipment   Toileting 0-->independent   Bathing 0-->independent   Dressing 0-->independent   Eating 0-->independent   Communication 0-->understands/communicates without difficulty   Swallowing 0-->swallows foods/liquids without difficulty   Cognition 0 - no cognition issues reported   Fall history within last six months no   General Information   Onset of Illness/Injury or Date of Surgery - Date 03/23/17   Referring Physician Marietta Landa MD   Patient/Family Goals Statement To return home.   Pertinent History of Current Problem (include personal factors and/or comorbidities that impact the POC) 76-year-old male with a history of hypertension as well as complicated paraesophageal hernia repair requiring reduction complicated by malnutrition, delirium, postoperative respiratory failure and malnutrition requiring jejunostomy feeding tube placement who presented to [facility] with abdominal pain and SBO - now POD#2 Laparoscopic Lysis of Adhesions and Diagnostic Laparoscopy.   Precautions/Limitations abdominal precautions   General Observations Pt just returned to  bed after walking halls with RN upon arrival; hesitant but agreeable to PT eval.   Cognitive Status Examination   Orientation orientation to person, place and time   Level of Consciousness alert   Follows Commands and Answers Questions 100% of the time   Personal Safety and Judgment intact   Memory intact   Pain Assessment   Patient Currently in Pain Yes, see Vital Sign flowsheet  (2/10 abdominal pain)   Range of Motion (ROM)   ROM Comment B LE WFL for mobility   Strength   Strength Comments B LE WFL for mobility; generalized weakness noted requiring UE assist for sit<>stand   Bed Mobility   Bed Mobility Comments SBA with vc's for sequencing and abdominal precautions   Transfer Skills   Transfer Comments SBA with FWW and vc's for safe hand placement and abdominal precautions   Gait   Gait Comments Pt amb. 200ft with FWW and SBA, cues for upright posture; up/down 6 stairs with B rail and CGA.   Balance   Balance Comments Seated balance good; standing balance fair requiring AD and SBA   General Therapy Interventions   Planned Therapy Interventions bed mobility training;gait training;transfer training;progressive activity/exercise   Clinical Impression   Criteria for Skilled Therapeutic Intervention yes, treatment indicated   PT Diagnosis Decreased mobility   Influenced by the following impairments Generalized weakness, bilateral knee pain, post-op abdominal pain and precautions   Functional limitations due to impairments Decreased safety and independence with mobility   Clinical Presentation Stable/Uncomplicated   Clinical Presentation Rationale stable vitals, pain controlled   Clinical Decision Making (Complexity) Low complexity   Therapy Frequency` daily   Predicted Duration of Therapy Intervention (days/wks) 2-3 days   Anticipated Discharge Disposition Home with Assist   Risk & Benefits of therapy have been explained Yes   Patient, Family & other staff in agreement with plan of care Yes   [facility] AM-PAC  "TM \"6 Clicks\"   2016, Trustees of [facility], under license to [employer].  All rights reserved.   6 Clicks Short Forms Basic Mobility Inpatient Short Form   [facility] AM-PAC  \"6 Clicks\" V.2 Basic Mobility Inpatient Short Form   1. Turning from your back to your side while in a flat bed without using bedrails? 4 - None   2. Moving from lying on your back to sitting on the side of a flat bed without using bedrails? 4 - None   3. Moving to and from a bed to a chair (including a wheelchair)? 4 - None   4. Standing up from a chair using your arms (e.g., wheelchair, or bedside chair)? 4 - None   5. To walk in hospital room? 4 - None   6. Climbing 3-5 steps with a railing? 3 - A Little   Basic Mobility Raw Score (Score out of 24.Lower scores equate to lower levels of function) 23   Total Evaluation Time   Total Evaluation Time (Minutes) 15     "

## 2017-03-25 NOTE — PROGRESS NOTES
"[facility]   General Surgery Progress Note         Assessment and Plan:   Assessment:   -POD#2 s/p Diagnostic Laparoscopy and Laparoscopic Lysis of Adhesions; bowel viable, no resection needed  -Expected postoperative ileus - resolving      Plan:   -Pain management: add scheduled ofirmev, IV dilaudid prn  -Start clears, slowly in small amounts  -Taper down IVF  -Prophylaxis: IV pepcid, PCDs  -Activity as tolerated today  -Await further return of bowel function (BM)         Interval History:   Resting in bed, comfortable. Complains of having to urinate frequently, denies dysuria, +IVF. Pain is controlled with Ofirmev. He is up walking the halls. Passing lots of flatus and is hungry.         Physical Exam:   Blood pressure 116/70, pulse 57, temperature 97.9  F (36.6  C), temperature source Oral, resp. rate 16, height 1.727 m (5' 8\"), weight 87.9 kg (193 lb 12.8 oz), SpO2 96 %.    I/O last 3 completed shifts:  In: 2038 [I.V.:2038]  Out: 2975 [Urine:2975]    Abdomen:   soft, non-distended, tender centrally (incisional) and good bowel sounds   Incs - clean, dry, steri-strips intact, possible early ecchymosis at right  Lateral incision           Data:     Recent Labs  Lab 03/24/17  1126 03/23/17  0543 03/23/17  0115   WBC 9.1 9.7 17.0*   HGB 14.6 16.7 17.3   HCT 42.5 48.3 49.7    97 97    173 216         Donny Jaramillo PA-C     The patient has been seen and examined by me.  I agree with the above assessment and plan.  OOB and ambulate with assist.  Consult PT.  Marietta Landa MD    "

## 2017-03-25 NOTE — PROGRESS NOTES
"[facility]  Hospitalist Progress Note  Janusz Soares MD 03/25/2017    Reason for Stay (Diagnosis): SBO         Assessment and Plan:      Summary of Stay: Pablo Fuentes is a 76 year old male with  History of HTN and incarcerated hernia, presented with abdominal pain and admitted to to hospital with SBO, on 3/23/2017.    Problem List:   1. Mechanical SBO, POD #1  S/p lap lysis of adhesion- pain controlled, no nausea or vomiting. Passed gas no BM yet. Started on clears, monitor lytes. Decrease IVF to 50 ml/hr for today, and if he tolerates diet may stop it tomorrow..  2. HTN- not well controlled. Continue BP medications as ordered, check BP later, if remains above 150, will adjust medication.parameters for BP.  3. Leukocytosis- reactive normalized.    DVT Prophylaxis: Pneumatic Compression Devices  Code Status: Full Code  Discharge Dispo: Home  Estimated Disch Date / # of Days until Disch: YTD, pending return of bowel function.  I discussed at length with patient his wife and nursing staff.  Discussed with surgery, Dr. Garcia.      Interval History (Subjective):      Patient seen and examined, feels better, no nausea, no gas passed. Sitting on a chair, wife at bedside.                  Physical Exam:      Last Vital Signs:  /85  Pulse 57  Temp 97.7  F (36.5  C) (Oral)  Resp 16  Ht 1.727 m (5' 8\")  Wt 87.9 kg (193 lb 12.8 oz)  SpO2 93%  BMI 29.47 kg/m2    I/O last 3 completed shifts:  In: 2038 [I.V.:2038]  Out: 2975 [Urine:2975]  Wt Readings from Last 1 Encounters:   03/25/17 87.9 kg (193 lb 12.8 oz)       Constitutional: Awake, alert, cooperative, no apparent distress   Respiratory: Clear to auscultation bilaterally, no crackles or wheezing   Cardiovascular: Regular rate and rhythm, normal S1 and S2, and no murmur noted   Abdomen: Hypoactive BS, soft, non-distended, non-tender   Skin: No rashes, no cyanosis, dry to touch   Neuro: Alert and oriented x3, no weakness, numbness, memory " loss   Extremities: No edema, normal range of motion   Other(s):        All other systems: Negative          Medications:      All current medications were reviewed with changes reflected in problem list.         Data:      All new lab and imaging data was reviewed.   Labs:    Recent Labs  Lab 03/25/17  0718 03/24/17  1126 03/23/17  0543    141 141   POTASSIUM 4.1 4.6 4.7   CHLORIDE 107 107 106   CO2 29 26 28   ANIONGAP 7 8 7   GLC 82 95 140*   BUN 11 14 23   CR 0.82 0.80 0.76   GFRESTIMATED >90Non  GFR Calc >90Non  GFR Calc >90Non  GFR Calc   GFRESTBLACK >90African American GFR Calc >90African American GFR Calc >90African American GFR Calc   JOANN 8.0* 8.0* 8.4*       Recent Labs  Lab 03/24/17  1126 03/23/17  0543 03/23/17  0115   WBC 9.1 9.7 17.0*   HGB 14.6 16.7 17.3   HCT 42.5 48.3 49.7    97 97    173 216       Recent Labs  Lab 03/25/17  0718 03/24/17  1126 03/23/17  0543 03/23/17  0115    141 141 140   POTASSIUM 4.1 4.6 4.7 3.8   CHLORIDE 107 107 106 102   CO2 29 26 28 26   ANIONGAP 7 8 7 12   GLC 82 95 140* 182*   BUN 11 14 23 27   CR 0.82 0.80 0.76 0.84   GFRESTIMATED >90Non  GFR Calc >90Non  GFR Calc >90Non  GFR Calc 88   GFRESTBLACK >90African American GFR Calc >90African American GFR Calc >90African American GFR Calc >90African American GFR Calc   JOANN 8.0* 8.0* 8.4* 9.0   MAG  --  2.1  --   --    PROTTOTAL  --   --   --  7.1   ALBUMIN  --   --   --  4.1   BILITOTAL  --   --   --  0.8   ALKPHOS  --   --   --  98   AST  --   --   --  21   ALT  --   --   --  25      Imaging:   Recent Results (from the past 48 hour(s))   CT Abdomen Pelvis w Contrast    Narrative    CT ABDOMEN PELVIS W CONTRAST  3/23/2017 2:22 AM     HISTORY: Abdominal pain. Lower abdominal pain. History of hernia  repair.    TECHNIQUE: Volumetric acquisition through abdomen and pelvis with I.V.  contrast. 95 mL Isovue-370.  Radiation dose for this scan was reduced  using automated exposure control, adjustment of the mA and/or kV  according to patient size, or iterative reconstruction technique.    COMPARISON: 2/10/2015.    FINDINGS: Multiple large gallstones in a mildly distended gallbladder.  No pericholecystic inflammation. There are a few tiny hypodensities in  the liver which are too small to characterize but are probably tiny  cysts. Pancreas, spleen, adrenal glands and kidneys demonstrate no  worrisome findings. Repair of the previously seen large hiatal hernia.  Again seen is a prominent duodenal diverticulum adjacent to the  pancreatic head.    Multiple fluid-filled dilated small bowel loops with decompressed  distal small bowel compatible with mechanical obstruction. There  appears to be an abrupt transition site in the left anterior pelvis.  Configuration of other bowel loops in this region suggests the  possibility that this is an internal hernia or other closed loop  obstruction. There is moderate fat stranding and mesenteric edema  adjacent to some of these loops in the left abdomen. Colonic  diverticulosis most prominent in the sigmoid colon. Small amount of  ascites. No free air.      Impression    IMPRESSION:  1. Mechanical small bowel obstruction. An internal hernia or other  closed loop obstruction cannot be excluded. Associated ascites and  moderate mesenteric stranding and edema. Recommend surgical  consultation.  2. Cholelithiasis.  3. Colonic diverticulosis.    MAURICIO PLASENCIA MD

## 2017-03-25 NOTE — PLAN OF CARE
Problem: Goal Outcome Summary  Goal: Goal Outcome Summary  Outcome: Improving     A/O x4. VSS ex htn prior to scheduled BP meds. Up with SBA and walker. Tolerating clears, voiding spont, IV SL. Lap sites WDL. + gas, hypo BS. Pt denies pain, gets scheduled PO tylenol. Up to chair and ambulating halls. PT following. Discharge pending return of bowel function. Will continue to monitor.

## 2017-03-26 ENCOUNTER — APPOINTMENT (OUTPATIENT)
Dept: PHYSICAL THERAPY | Facility: CLINIC | Age: 77
DRG: 336 | End: 2017-03-26
Payer: MEDICARE

## 2017-03-26 VITALS
HEART RATE: 57 BPM | BODY MASS INDEX: 27.93 KG/M2 | TEMPERATURE: 96.2 F | DIASTOLIC BLOOD PRESSURE: 96 MMHG | RESPIRATION RATE: 16 BRPM | HEIGHT: 68 IN | WEIGHT: 184.3 LBS | SYSTOLIC BLOOD PRESSURE: 137 MMHG | OXYGEN SATURATION: 92 %

## 2017-03-26 PROBLEM — K56.609 SBO (SMALL BOWEL OBSTRUCTION) (H): Status: RESOLVED | Noted: 2017-03-23 | Resolved: 2017-03-26

## 2017-03-26 LAB
ANION GAP SERPL CALCULATED.3IONS-SCNC: 8 MMOL/L (ref 3–14)
BUN SERPL-MCNC: 11 MG/DL (ref 7–30)
CALCIUM SERPL-MCNC: 8.3 MG/DL (ref 8.5–10.1)
CHLORIDE SERPL-SCNC: 104 MMOL/L (ref 94–109)
CO2 SERPL-SCNC: 27 MMOL/L (ref 20–32)
CREAT SERPL-MCNC: 0.72 MG/DL (ref 0.66–1.25)
GFR SERPL CREATININE-BSD FRML MDRD: ABNORMAL ML/MIN/1.7M2
GLUCOSE SERPL-MCNC: 86 MG/DL (ref 70–99)
POTASSIUM SERPL-SCNC: 3.9 MMOL/L (ref 3.4–5.3)
SODIUM SERPL-SCNC: 139 MMOL/L (ref 133–144)

## 2017-03-26 PROCEDURE — 80048 BASIC METABOLIC PNL TOTAL CA: CPT | Performed by: INTERNAL MEDICINE

## 2017-03-26 PROCEDURE — 40000193 ZZH STATISTIC PT WARD VISIT

## 2017-03-26 PROCEDURE — 25000132 ZZH RX MED GY IP 250 OP 250 PS 637: Mod: GY | Performed by: INTERNAL MEDICINE

## 2017-03-26 PROCEDURE — 36415 COLL VENOUS BLD VENIPUNCTURE: CPT | Performed by: INTERNAL MEDICINE

## 2017-03-26 PROCEDURE — 25000132 ZZH RX MED GY IP 250 OP 250 PS 637: Mod: GY | Performed by: HOSPITALIST

## 2017-03-26 PROCEDURE — 97116 GAIT TRAINING THERAPY: CPT | Mod: GP

## 2017-03-26 PROCEDURE — A9270 NON-COVERED ITEM OR SERVICE: HCPCS | Mod: GY | Performed by: INTERNAL MEDICINE

## 2017-03-26 PROCEDURE — A9270 NON-COVERED ITEM OR SERVICE: HCPCS | Mod: GY | Performed by: HOSPITALIST

## 2017-03-26 PROCEDURE — 99239 HOSP IP/OBS DSCHRG MGMT >30: CPT | Performed by: HOSPITALIST

## 2017-03-26 RX ORDER — ACETAMINOPHEN 500 MG
500-1000 TABLET ORAL EVERY 6 HOURS PRN
Qty: 50 TABLET | Refills: 1 | Status: ON HOLD | COMMUNITY
Start: 2017-03-26 | End: 2017-04-02

## 2017-03-26 RX ORDER — AMOXICILLIN 250 MG
1 CAPSULE ORAL 2 TIMES DAILY
Status: DISCONTINUED | OUTPATIENT
Start: 2017-03-26 | End: 2017-03-26 | Stop reason: HOSPADM

## 2017-03-26 RX ORDER — HYDROCODONE BITARTRATE AND ACETAMINOPHEN 5; 325 MG/1; MG/1
1-2 TABLET ORAL EVERY 4 HOURS PRN
Status: DISCONTINUED | OUTPATIENT
Start: 2017-03-26 | End: 2017-03-26 | Stop reason: HOSPADM

## 2017-03-26 RX ORDER — HYDROCODONE BITARTRATE AND ACETAMINOPHEN 5; 325 MG/1; MG/1
1-2 TABLET ORAL EVERY 4 HOURS PRN
Qty: 20 TABLET | Refills: 0 | Status: SHIPPED | OUTPATIENT
Start: 2017-03-26 | End: 2017-12-22

## 2017-03-26 RX ORDER — ACETAMINOPHEN 500 MG
500-1000 TABLET ORAL EVERY 6 HOURS PRN
Status: DISCONTINUED | OUTPATIENT
Start: 2017-03-26 | End: 2017-03-26 | Stop reason: HOSPADM

## 2017-03-26 RX ADMIN — RANITIDINE HYDROCHLORIDE 150 MG: 150 TABLET, FILM COATED ORAL at 08:40

## 2017-03-26 RX ADMIN — AMLODIPINE BESYLATE 5 MG: 5 TABLET ORAL at 08:40

## 2017-03-26 RX ADMIN — METOPROLOL TARTRATE 25 MG: 25 TABLET, FILM COATED ORAL at 08:40

## 2017-03-26 RX ADMIN — ACETAMINOPHEN 1000 MG: 500 TABLET, FILM COATED ORAL at 03:10

## 2017-03-26 NOTE — PROGRESS NOTES
"[facility]   General Surgery Progress Note           Assessment and Plan:   Assessment:   POD#3 s/p Procedure(s):  Laparoscopic Lysis of Adhesions and Diagnostic Laparoscopy - Wound Class: I-Clean  -Expected postoperative ileus - resolving      Plan:   ADAT.  DC home after tolerates regular diet.  Likely this afternoon.  Pt to take norco script and fill as needed.  Has miralax at home if needed.  F/U Dr. Garcia in 1-3 weeks         Interval History:   Feeling well.  Tolerating clears.  Had a BM and wants to go home ASAP.         Physical Exam:   Blood pressure (!) 133/101, pulse 57, temperature 96.2  F (35.7  C), temperature source Oral, resp. rate 16, height 1.727 m (5' 8\"), weight 83.6 kg (184 lb 4.8 oz), SpO2 92 %.    I/O last 3 completed shifts:  In: 1505 [P.O.:1060; I.V.:445]  Out: 2600 [Urine:2600]    Abdomen: soft, ND, NT  Inc(s) - clean, dry, intact.  Without erythema.  Without subcutaneous fluid or masses.             Data:     Recent Labs   Lab Test  03/24/17   1126  03/23/17   0543  03/23/17   0115   HGB  14.6  16.7  17.3   WBC  9.1  9.7  17.0*       Marietta Landa MD       "

## 2017-03-26 NOTE — PROGRESS NOTES
"/82 (BP Location: Left arm)  Pulse 57  Temp 97.4  F (36.3  C) (Oral)  Resp 16  Ht 1.727 m (5' 8\")  Wt 87.9 kg (193 lb 12.8 oz)  SpO2 95%  BMI 29.47 kg/m2  Admitted- 3/23/17 SBO had S/p lap lysis of adhesion and is POD #2  Hx-HTN and incarcerated hernia  Code- Full  A/O x4 but forgetful  Lung Sound- clear on RA  Heart tones-WNL Tele-none   Edema-none  GI- BS-hypoactive Flatus-yes- Nausea-none BM 3/23 before surgery  Tolerating clear diets  - voiding well in the urinal  Surgical site- lap sites with adhesive strips   IV-sl'd  Pain-denies pain  Activity-A1 RW  Labs- on k and mg protocol k 4.1 mg 2.1  DC plan-pending bowel function  "

## 2017-03-26 NOTE — DISCHARGE SUMMARY
Hospitalist Discharge Summary    Pablo Fuentes MRN# [id number]   YOB: 1940 Age: 76 year old     Date of Admission:  3/23/2017  Date of Discharge:  3/26/2017  Admitting Physician:  Tristen Hernandez,   Discharge Physician:  Tristen Hernandez  Discharging Service:  Hospitalist     Primary Provider: Isaac Nunez          Discharge Diagnosis:   Mechanical SBO s/p KRISTIE  HTN  Leukocytosis, resolved and likely WBC stress demargination             Discharge Disposition:   Discharged to home           Allergies:   No Known Allergies           Discharge Medications:   Current Discharge Medication List      START taking these medications    Details   acetaminophen (TYLENOL) 500 MG tablet Take 1-2 tablets (500-1,000 mg) by mouth every 6 hours as needed (pain)  Qty: 50 tablet, Refills: 1    Associated Diagnoses: Small bowel obstruction (H)      HYDROcodone-acetaminophen (NORCO) 5-325 MG per tablet Take 1-2 tablets by mouth every 4 hours as needed for moderate to severe pain  Qty: 20 tablet, Refills: 0    Associated Diagnoses: Small bowel obstruction (H)         CONTINUE these medications which have NOT CHANGED    Details   ASA-APAP-Caff Buffered (VANQUISH) 227-194-33 MG TABS Take 1 tablet by mouth 3 times daily as needed      BIOTIN PO Take by mouth daily      CRANBERRY PO Take by mouth daily      !! Multiple Vitamins-Minerals (OCUVITE PO) Take by mouth daily      AmLODIPine Besylate (NORVASC PO) Take 5 mg by mouth daily      Metoprolol Tartrate (LOPRESSOR PO) Take 25 mg by mouth 2 times daily      !! multivitamin, therapeutic with minerals (THERA-VIT-M) TABS Take 1 tablet by mouth daily  Qty: 30 each, Refills: 0    Associated Diagnoses: Malnutrition (H)       !! - Potential duplicate medications found. Please discuss with provider.                 Condition on Discharge:   Discharge condition: Stable   Discharge vitals: Blood pressure (!) 133/101, pulse 57, temperature 96.2  F (35.7  C), temperature source  "Oral, resp. rate 16, height 1.727 m (5' 8\"), weight 83.6 kg (184 lb 4.8 oz), SpO2 92 %.   Code status on discharge: Full Code      BASIC PHYSICAL EXAMINATION:  GENERAL: No apparent distress.  CARDIOVASCULAR: Regular rate and rhythm without murmurs.  PULMONARY: Clear to auscultation bilaterally.   GASTROINTESTINAL: Abdomen soft, non-tender.  EXTREMITIES: No edema, pulses intact.  NEUROLOGIC: No focal deficits.          History of Illness:   See detailed admission note for full details.               Procedures excluding imaging which is summarized below:   See EMR           Consultations:   SURGERY GENERAL IP CONSULT  PHYSICAL THERAPY ADULT IP CONSULT          Significant Results:   Results for orders placed or performed during the hospital encounter of 03/23/17   CT Abdomen Pelvis w Contrast    Narrative    CT ABDOMEN PELVIS W CONTRAST  3/23/2017 2:22 AM     HISTORY: Abdominal pain. Lower abdominal pain. History of hernia  repair.    TECHNIQUE: Volumetric acquisition through abdomen and pelvis with I.V.  contrast. 95 mL Isovue-370. Radiation dose for this scan was reduced  using automated exposure control, adjustment of the mA and/or kV  according to patient size, or iterative reconstruction technique.    COMPARISON: 2/10/2015.    FINDINGS: Multiple large gallstones in a mildly distended gallbladder.  No pericholecystic inflammation. There are a few tiny hypodensities in  the liver which are too small to characterize but are probably tiny  cysts. Pancreas, spleen, adrenal glands and kidneys demonstrate no  worrisome findings. Repair of the previously seen large hiatal hernia.  Again seen is a prominent duodenal diverticulum adjacent to the  pancreatic head.    Multiple fluid-filled dilated small bowel loops with decompressed  distal small bowel compatible with mechanical obstruction. There  appears to be an abrupt transition site in the left anterior pelvis.  Configuration of other bowel loops in this region " suggests the  possibility that this is an internal hernia or other closed loop  obstruction. There is moderate fat stranding and mesenteric edema  adjacent to some of these loops in the left abdomen. Colonic  diverticulosis most prominent in the sigmoid colon. Small amount of  ascites. No free air.      Impression    IMPRESSION:  1. Mechanical small bowel obstruction. An internal hernia or other  closed loop obstruction cannot be excluded. Associated ascites and  moderate mesenteric stranding and edema. Recommend surgical  consultation.  2. Cholelithiasis.  3. Colonic diverticulosis.    MAURICIO PLASENCIA MD                Pending Results:   Unresulted Labs Ordered in the Past 30 Days of this Admission     No orders found from 1/22/2017 to 3/24/2017.                        Discharge Instructions and Follow-Up:   Discharge instructions and follow-up:   Discharge Procedure Orders  Follow Up and recommended labs and tests   Order Comments: Follow up with primary care provider, Isaac Nunez, within 7 days for hospital follow- up.  No follow up labs or test are needed.  Follow up with Dr Garcia in 1-3 weeks.  The patient was provided with a limited supply of oral narcotic pain medication.  The patient was instructed not to take the pain medication above the prescribed dose and frequency due to the potential for addiction, respiratory depression, and even death. The patient expressed understanding of these instructions.     Activity   Order Comments: Your activity upon discharge: activity as tolerated and no driving while on analgesics   Order Specific Question Answer Comments   Is discharge order? Yes      Full Code     Diet   Order Comments: Follow this diet upon discharge: Regular   Order Specific Question Answer Comments   Is discharge order? Yes                Hospital Course:   Pablo Fuentes is a 76-year-old male with a history of hypertension and incarcerated giant hiatal hernia requiring laparoscopic reduction  with percutaneous endoscopic gastrostomy tube as well as bilateral thoracotomies and laparoscopic jejunostomy feeding tube placement due to a complicated postoperative hospitalization in which he was intubated for complications related to postoperative pneumonia and ICU delirium. He developed abdominal pain at 7:00 p.m. on the night of the 22nd. This was very sudden and diffuse pain. He had multiple episodes of vomiting and persistent nausea. Due to these issues, he went to the urgent care. His condition was concerning, so he was directed to the emergency department for evaluation. Here, he was hemodynamically stable upon arrival. CT of the abdomen and pelvis showed mechanical small-bowel obstruction. An Internal hernia or other closed loop obstruction cannot be excluded. Associated ascites and moderate mesenteric stranding and edema were present as well. Surgery was emergently consulted and took the patient to the operating room. However, his symptoms abruptly abated and surgical intervention was canceled. He was subsequently admitted to the Hospitalist Service for further cares.     However, the pain returned and he was taken to the OR and findings were that there was a hemorrhagic loop of bowel in the central abdomen with an obvious obstructing band going up to near the umbilicus. This band was divided, allowing the bowel to mobilize. Surgery followed the hemorrhagic loop in both directions until completely normal-looking bowel was encountered. All the visualized hemorrhagic bowel appeared viable. He was found to have closed loop small bowel obstruction and required laparoscopic lysis of adhesions and diagnostic laparoscopy. Post operatively he has done very well. He developed an expected post operative ileus but is now having bowel movements. He has no pain nor nausea. He is tolerating full liquid diet and will advance to regular diet this afternoon. If he tolerates that diet, he will discharge this  afternoon.    The patient was seen, examined, and counseled on this day. The hospitalization and plan of care was reviewed with the patient extensively. All questions were addressed and the patient agreed to follow-up as noted above.      Total time spent in face to face contact with the patient and coordinating discharge was:  35 Minutes    Tristen Hernandez DO MPH  [facility]ly [facility] Hospitalist  [address] [place] [address]  Pager: [phone]  03/26/2017

## 2017-03-26 NOTE — PROGRESS NOTES
VSS. IV removed. Belongings with pt and spouse in room. Discharge instructions and medications reviewed with pt. Wheelchair transport out of hospital with volunteer.

## 2017-03-26 NOTE — DISCHARGE INSTRUCTIONS
HOME CARE FOLLOWING ABDOMINAL SURGERY  TIERRA Morales, ADITYA Estrada, MAURICE Stoddard, DENISE Pearce     INCISIONAL CARE:  Replace the bandage over your incision (or incisions) until all drainage stops, or if more comfortable to have in place.  If present, leave the steri-strips (white paper tapes) in place till they fall off.  If you have staples in your incision at the time of discharge, they will be removed at your follow-up appointment.  If Dermabond (a type of skin glue) is present, leave in place until it wears/flakes off.     BATHING:  Avoid baths for 1 week after surgery.  Showers are okay.  You may wash your hair at any time.  Gently pat your incision dry after bathing.    ACTIVITY:  Light Activity -- you may immediately be up and about as tolerated.  Driving -- you may drive when comfortable and off narcotic pain medications.  Light Work -- resume when comfortable off pain medications.  (If you can drive, you probably can work.)  Strenuous Work/Activity -- limit lifting to 25 pounds for 6 weeks.  Then, progressively increase with time.  Active Sports (running, biking, etc.) -- cautiously resume after 6 weeks.    DISCOMFORT:  Use pain medications as prescribed by your surgeon.  Take the pain medication with some food, when possible, to minimize side effects.  Expect gradual improvement.    DIET:  Return to diet you were on before surgery, unless you are given specific diet instructions.  Drink plenty of fluids.  While taking pain medications, increase dietary fiber or add a fiber supplementation like Metamucil or Citrucel to help prevent constipation - a possible side effect of pain medications.    NAUSEA:  If nauseated from the anesthetic/pain meds; rest in bed, get up cautiously with assistance, and drink clear liquids (juice, tea, broth).    RETURN APPOINTMENT:  Schedule a follow-up visit 1-3 weeks after discharge from the hospital.  Office Phone:  [phone]     CONTACT US IF  THE FOLLOWING DEVELOPS:   1. A fever that is above 101     2. If there is a large amount of drainage, bleeding, or swelling.   3. Severe pain that is not relieved by your prescription.   4. Drainage that is thick, cloudy, yellow, green or white.   5. Any other questions not answered by  Frequently Asked Questions  sheet.      FREQUENTLY ASKED QUESTIONS:    Q:  How should my incision look?    A:  Normally your incision will appear slightly swollen with light redness directly along the incision itself as it heals.  It may feel like a bump or ridge as the healing/scarring happens, and over time (3-4 months) this bump or ridge feeling should slowly go away.  In general, clear or pink watery drainage can be normal at first as your incision heals, but should decrease over time.    Q:  How do I know if my incision is infected?  A:  Look at your incision for signs of infection, like redness around the incision spreading to surrounding skin, or drainage of cloudy or foul-smelling drainage.  If you feel warm, check your temperature to see if you are running a fever.    **If any of these things occur, please notify the nurse at our office.  We may need you to come into the office for an incision check.      Q:  How do I take care of my incision?  A:  If you have a dressing in place - Starting the day after surgery, replace the dressing 1-2 times a day until there is no further drainage from the incision.  At that time, a dressing is no longer needed.  Try to minimize tape on the skin if irritation is occurring at the tape sites.  If you have significant irritation from tape on the skin, please call the office to discuss other method of dressing your incision.    Small pieces of tape called  steri-strips  may be present directly overlying your incision; these may be removed 10 days after surgery unless otherwise specified by your surgeon.  If these tapes start to loosen at the ends, you may trim them back until they fall off or  are removed.    A:  If you had  Dermabond  tissue glue used as a dressing (this causes your incision to look shiny with a clear covering over it) - This type of dressing wears off with time and does not require more dressings over the top unless it is draining around the glue as it wears off.  Do not apply ointments or lotions over the incisions until the glue has completely worn off.    Q:  There is a piece of tape or a sticky  lead  still on my skin.  Can I remove this?  A:  Sometimes the sticky  leads  used for monitoring during surgery or for evaluation in the emergency department are not all removed while you are in the hospital.  These sometimes have a tab or metal dot on them.  You can easily remove these on your own, like taking off a band-aid.  If there is a gel substance under the  lead , simply wipe/clean it off with a washcloth or paper towel.      Q:  What can I do to minimize constipation (very hard stools, or lack of stools)?  A:  Stay well hydrated.  Increase your dietary fiber intake or take a fiber supplement -with plenty of water.  Walk around frequently.  You may consider an over-the-counter stool-softener.  Your Pharmacist can assist you with choosing one that is stocked at your pharmacy.  Constipation is also one of the most common side effects of pain medication.  If you are using pain medication, be pro-active and try to PREVENT problems with constipation by taking the steps above BEFORE constipation becomes a problem.    Q:  What do I do if I need more pain medications?  A:  Call the office to receive refills.  Be aware that certain pain meds cannot be called into a pharmacy and actually require a paper prescription.  A change may be made in your pain med as you progress thru your recovery period or if you have side effects to certain meds.    --Pain meds are NOT refilled after 5pm on weekdays, and NOT AT ALL on the weekends, so please look ahead to prevent problems.      Q:  Why am I having  a hard time sleeping now that I am at home?  A:  Many medications you receive while you are in the hospital can impact your sleep for a number of days after your surgery/hospitalization.  Decreased level of activity and naps during the day may also make sleeping at night difficult.  Try to minimize day-time naps, and get up frequently during the day to walk around your home during your recovery time.  Sleep aides may be of some help, but are not recommended for long-term use.      Q:  I am having some back discomfort.  What should I do?  A:  This may be related to certain positioning that was required for your surgery, extended periods of time in bed, or other changes in your overall activity level.  You may try ice, heat, acetaminophen, or ibuprofen to treat this temporarily.  Note that many pain medications have acetaminophen in them and would state this on the prescription bottle.  Be sure not to exceed the maximum of 4000mg per day of acetaminophen.     **If the pain you are having does not resolve, is severe, or is a flare of back pain you have had on other occasions prior to surgery, please contact your primary physician for further recommendations or for an appointment to be examined at their office.    Q:  Why am I having headaches?  A:  Headaches can be caused by many things:  caffeine withdrawal, use of pain meds, dehydration, high blood pressure, lack of sleep, over-activity/exhaustion, flare-up of usual migraine headaches.  If you feel this is related to muscle tension (a band-like feeling around the head, or a pressure at the low-back of the head) you may try ice or heat to this area.  You may need to drink more fluids (try electrolyte drink like Gatorade), rest, or take your usual migraine medications.   **If your headaches do not resolve, worsen, are accompanied by other symptoms, or if your blood pressure is high, please call your primary physician for recommendation and/or examination.    Q:  I am  unable to urinate.  What do I do?  A:  A small percentage of people can have difficulty urinating initially after surgery.  This includes being able to urinate only a very small amount at a time and feeling discomfort or pressure in the very low abdomen.  This is called  urinary retention , and is actually an urgent situation.  Proceed to your nearest Emergency department for evaluation (not an Urgent Care Center).  Sometimes the bladder does not work correctly after certain medications you receive during surgery, or related to certain procedures.  You may need to have a catheter placed until your bladder recovers.  When planning to go to an Emergency department, it may help to call the ER to let them know you are coming in for this problem after a surgery.  This may help you get in quicker to be evaluated.  **If you have symptoms of a urinary tract infection, please contact your primary physician for the proper evaluation and treatment.          If you have other questions, please call the office Monday thru Friday between 8am and 5pm to discuss with the nurse or physician assistant.  #[phone]    There is a surgeon ON CALL on weekday evenings and over the weekend in case of urgent need only, and may be contacted at the same number.    If you are having an emergency, call 911 or proceed to your nearest emergency department.

## 2017-04-02 ENCOUNTER — APPOINTMENT (OUTPATIENT)
Dept: GENERAL RADIOLOGY | Facility: CLINIC | Age: 77
DRG: 247 | End: 2017-04-02
Attending: EMERGENCY MEDICINE
Payer: MEDICARE

## 2017-04-02 ENCOUNTER — HOSPITAL ENCOUNTER (INPATIENT)
Facility: CLINIC | Age: 77
LOS: 4 days | Discharge: HOME | DRG: 247 | End: 2017-04-06
Attending: EMERGENCY MEDICINE | Admitting: INTERNAL MEDICINE
Payer: MEDICARE

## 2017-04-02 DIAGNOSIS — I21.4 NSTEMI (NON-ST ELEVATED MYOCARDIAL INFARCTION) (H): ICD-10-CM

## 2017-04-02 DIAGNOSIS — F41.9 ANXIETY: Primary | ICD-10-CM

## 2017-04-02 DIAGNOSIS — I10 ESSENTIAL HYPERTENSION: ICD-10-CM

## 2017-04-02 DIAGNOSIS — R10.12 ABDOMINAL PAIN, LEFT UPPER QUADRANT: ICD-10-CM

## 2017-04-02 PROBLEM — I24.9 ACS (ACUTE CORONARY SYNDROME) (H): Status: ACTIVE | Noted: 2017-04-02

## 2017-04-02 LAB
ANION GAP SERPL CALCULATED.3IONS-SCNC: 10 MMOL/L (ref 3–14)
BASOPHILS # BLD AUTO: 0.1 10E9/L (ref 0–0.2)
BASOPHILS NFR BLD AUTO: 0.6 %
BUN SERPL-MCNC: 17 MG/DL (ref 7–30)
CALCIUM SERPL-MCNC: 8.5 MG/DL (ref 8.5–10.1)
CHLORIDE SERPL-SCNC: 105 MMOL/L (ref 94–109)
CHOLEST SERPL-MCNC: 129 MG/DL
CO2 SERPL-SCNC: 25 MMOL/L (ref 20–32)
CREAT SERPL-MCNC: 0.8 MG/DL (ref 0.66–1.25)
DIFFERENTIAL METHOD BLD: ABNORMAL
EOSINOPHIL # BLD AUTO: 0.2 10E9/L (ref 0–0.7)
EOSINOPHIL NFR BLD AUTO: 2.3 %
ERYTHROCYTE [DISTWIDTH] IN BLOOD BY AUTOMATED COUNT: 11.9 % (ref 10–15)
ERYTHROCYTE [DISTWIDTH] IN BLOOD BY AUTOMATED COUNT: 11.9 % (ref 10–15)
GFR SERPL CREATININE-BSD FRML MDRD: ABNORMAL ML/MIN/1.7M2
GLUCOSE SERPL-MCNC: 126 MG/DL (ref 70–99)
HCT VFR BLD AUTO: 42.5 % (ref 40–53)
HCT VFR BLD AUTO: 46.9 % (ref 40–53)
HDLC SERPL-MCNC: 44 MG/DL
HGB BLD-MCNC: 15.3 G/DL (ref 13.3–17.7)
HGB BLD-MCNC: 16.3 G/DL (ref 13.3–17.7)
IMM GRANULOCYTES # BLD: 0 10E9/L (ref 0–0.4)
IMM GRANULOCYTES NFR BLD: 0.4 %
INTERPRETATION ECG - MUSE: NORMAL
INTERPRETATION ECG - MUSE: NORMAL
LDLC SERPL CALC-MCNC: 68 MG/DL
LMWH PPP CHRO-ACNC: 0.5 IU/ML
LMWH PPP CHRO-ACNC: NORMAL IU/ML
LYMPHOCYTES # BLD AUTO: 2.7 10E9/L (ref 0.8–5.3)
LYMPHOCYTES NFR BLD AUTO: 33.3 %
MCH RBC QN AUTO: 33.5 PG (ref 26.5–33)
MCH RBC QN AUTO: 34.5 PG (ref 26.5–33)
MCHC RBC AUTO-ENTMCNC: 34.8 G/DL (ref 31.5–36.5)
MCHC RBC AUTO-ENTMCNC: 36 G/DL (ref 31.5–36.5)
MCV RBC AUTO: 96 FL (ref 78–100)
MCV RBC AUTO: 97 FL (ref 78–100)
MONOCYTES # BLD AUTO: 0.9 10E9/L (ref 0–1.3)
MONOCYTES NFR BLD AUTO: 10.9 %
NEUTROPHILS # BLD AUTO: 4.2 10E9/L (ref 1.6–8.3)
NEUTROPHILS NFR BLD AUTO: 52.5 %
NONHDLC SERPL-MCNC: 85 MG/DL
NRBC # BLD AUTO: 0 10*3/UL
NRBC BLD AUTO-RTO: 0 /100
PLATELET # BLD AUTO: 142 10E9/L (ref 150–450)
PLATELET # BLD AUTO: 211 10E9/L (ref 150–450)
POTASSIUM SERPL-SCNC: 3.9 MMOL/L (ref 3.4–5.3)
RBC # BLD AUTO: 4.44 10E12/L (ref 4.4–5.9)
RBC # BLD AUTO: 4.86 10E12/L (ref 4.4–5.9)
SODIUM SERPL-SCNC: 140 MMOL/L (ref 133–144)
TRIGL SERPL-MCNC: 83 MG/DL
TROPONIN I SERPL-MCNC: 0.17 UG/L (ref 0–0.04)
TROPONIN I SERPL-MCNC: 10.46 UG/L (ref 0–0.04)
TROPONIN I SERPL-MCNC: 13.33 UG/L (ref 0–0.04)
TROPONIN I SERPL-MCNC: 2.2 UG/L (ref 0–0.04)
WBC # BLD AUTO: 6.2 10E9/L (ref 4–11)
WBC # BLD AUTO: 8 10E9/L (ref 4–11)

## 2017-04-02 PROCEDURE — 93005 ELECTROCARDIOGRAM TRACING: CPT

## 2017-04-02 PROCEDURE — 99222 1ST HOSP IP/OBS MODERATE 55: CPT | Performed by: INTERNAL MEDICINE

## 2017-04-02 PROCEDURE — 25000128 H RX IP 250 OP 636: Performed by: EMERGENCY MEDICINE

## 2017-04-02 PROCEDURE — 25000132 ZZH RX MED GY IP 250 OP 250 PS 637: Mod: GY | Performed by: INTERNAL MEDICINE

## 2017-04-02 PROCEDURE — 71020 XR CHEST 2 VW: CPT

## 2017-04-02 PROCEDURE — 84484 ASSAY OF TROPONIN QUANT: CPT | Performed by: EMERGENCY MEDICINE

## 2017-04-02 PROCEDURE — 25000128 H RX IP 250 OP 636: Performed by: INTERNAL MEDICINE

## 2017-04-02 PROCEDURE — 99223 1ST HOSP IP/OBS HIGH 75: CPT | Performed by: INTERNAL MEDICINE

## 2017-04-02 PROCEDURE — 80061 LIPID PANEL: CPT | Performed by: INTERNAL MEDICINE

## 2017-04-02 PROCEDURE — 84484 ASSAY OF TROPONIN QUANT: CPT | Performed by: INTERNAL MEDICINE

## 2017-04-02 PROCEDURE — 99285 EMERGENCY DEPT VISIT HI MDM: CPT | Mod: 25

## 2017-04-02 PROCEDURE — 80048 BASIC METABOLIC PNL TOTAL CA: CPT | Performed by: EMERGENCY MEDICINE

## 2017-04-02 PROCEDURE — 85520 HEPARIN ASSAY: CPT | Performed by: INTERNAL MEDICINE

## 2017-04-02 PROCEDURE — 36415 COLL VENOUS BLD VENIPUNCTURE: CPT | Performed by: INTERNAL MEDICINE

## 2017-04-02 PROCEDURE — A9270 NON-COVERED ITEM OR SERVICE: HCPCS | Mod: GY | Performed by: INTERNAL MEDICINE

## 2017-04-02 PROCEDURE — 85027 COMPLETE CBC AUTOMATED: CPT | Performed by: INTERNAL MEDICINE

## 2017-04-02 PROCEDURE — 96374 THER/PROPH/DIAG INJ IV PUSH: CPT

## 2017-04-02 PROCEDURE — 12000007 ZZH R&B INTERMEDIATE

## 2017-04-02 PROCEDURE — 25000132 ZZH RX MED GY IP 250 OP 250 PS 637: Mod: GY | Performed by: EMERGENCY MEDICINE

## 2017-04-02 PROCEDURE — 85025 COMPLETE CBC W/AUTO DIFF WBC: CPT | Performed by: EMERGENCY MEDICINE

## 2017-04-02 PROCEDURE — A9270 NON-COVERED ITEM OR SERVICE: HCPCS | Mod: GY | Performed by: EMERGENCY MEDICINE

## 2017-04-02 RX ORDER — METOPROLOL TARTRATE 25 MG/1
25 TABLET, FILM COATED ORAL 2 TIMES DAILY
Status: DISCONTINUED | OUTPATIENT
Start: 2017-04-02 | End: 2017-04-06 | Stop reason: HOSPADM

## 2017-04-02 RX ORDER — AMOXICILLIN 250 MG
1-2 CAPSULE ORAL 2 TIMES DAILY PRN
Status: DISCONTINUED | OUTPATIENT
Start: 2017-04-02 | End: 2017-04-06 | Stop reason: HOSPADM

## 2017-04-02 RX ORDER — ASPIRIN 81 MG/1
324 TABLET, CHEWABLE ORAL ONCE
Status: COMPLETED | OUTPATIENT
Start: 2017-04-02 | End: 2017-04-02

## 2017-04-02 RX ORDER — LISINOPRIL 2.5 MG/1
2.5 TABLET ORAL DAILY
Status: DISCONTINUED | OUTPATIENT
Start: 2017-04-02 | End: 2017-04-03

## 2017-04-02 RX ORDER — ASPIRIN 81 MG/1
81 TABLET ORAL DAILY
Status: DISCONTINUED | OUTPATIENT
Start: 2017-04-03 | End: 2017-04-03

## 2017-04-02 RX ORDER — NITROGLYCERIN 0.4 MG/1
0.4 TABLET SUBLINGUAL
Status: COMPLETED | OUTPATIENT
Start: 2017-04-02 | End: 2017-04-02

## 2017-04-02 RX ORDER — POLYETHYLENE GLYCOL 3350 17 G/17G
17 POWDER, FOR SOLUTION ORAL DAILY PRN
Status: DISCONTINUED | OUTPATIENT
Start: 2017-04-02 | End: 2017-04-06 | Stop reason: HOSPADM

## 2017-04-02 RX ORDER — SODIUM CHLORIDE 9 MG/ML
INJECTION, SOLUTION INTRAVENOUS CONTINUOUS
Status: DISCONTINUED | OUTPATIENT
Start: 2017-04-02 | End: 2017-04-03 | Stop reason: ALTCHOICE

## 2017-04-02 RX ORDER — ASPIRIN 325 MG
325 TABLET ORAL DAILY
Status: DISCONTINUED | OUTPATIENT
Start: 2017-04-02 | End: 2017-04-02

## 2017-04-02 RX ORDER — ONDANSETRON 4 MG/1
4 TABLET, ORALLY DISINTEGRATING ORAL EVERY 6 HOURS PRN
Status: DISCONTINUED | OUTPATIENT
Start: 2017-04-02 | End: 2017-04-06 | Stop reason: HOSPADM

## 2017-04-02 RX ORDER — AMLODIPINE BESYLATE 5 MG/1
5 TABLET ORAL DAILY
Status: DISCONTINUED | OUTPATIENT
Start: 2017-04-02 | End: 2017-04-03

## 2017-04-02 RX ORDER — HYDROCODONE BITARTRATE AND ACETAMINOPHEN 5; 325 MG/1; MG/1
1-2 TABLET ORAL EVERY 4 HOURS PRN
Status: DISCONTINUED | OUTPATIENT
Start: 2017-04-02 | End: 2017-04-04

## 2017-04-02 RX ORDER — NITROGLYCERIN 0.4 MG/1
0.4 TABLET SUBLINGUAL ONCE
Status: DISCONTINUED | OUTPATIENT
Start: 2017-04-02 | End: 2017-04-02

## 2017-04-02 RX ORDER — ACETAMINOPHEN 500 MG
500-1000 TABLET ORAL EVERY 6 HOURS PRN
Status: DISCONTINUED | OUTPATIENT
Start: 2017-04-02 | End: 2017-04-02

## 2017-04-02 RX ORDER — ACETAMINOPHEN 325 MG/1
650 TABLET ORAL EVERY 4 HOURS PRN
Status: DISCONTINUED | OUTPATIENT
Start: 2017-04-02 | End: 2017-04-04

## 2017-04-02 RX ORDER — ATORVASTATIN CALCIUM 40 MG/1
40 TABLET, FILM COATED ORAL DAILY
Status: DISCONTINUED | OUTPATIENT
Start: 2017-04-02 | End: 2017-04-06 | Stop reason: HOSPADM

## 2017-04-02 RX ORDER — MULTIVIT WITH MINERALS/LUTEIN
1 TABLET ORAL DAILY
COMMUNITY
End: 2018-09-10

## 2017-04-02 RX ORDER — BIOTIN 10 MG
10000 TABLET ORAL
COMMUNITY
End: 2018-09-10

## 2017-04-02 RX ORDER — NALOXONE HYDROCHLORIDE 0.4 MG/ML
.1-.4 INJECTION, SOLUTION INTRAMUSCULAR; INTRAVENOUS; SUBCUTANEOUS
Status: DISCONTINUED | OUTPATIENT
Start: 2017-04-02 | End: 2017-04-04

## 2017-04-02 RX ORDER — ONDANSETRON 2 MG/ML
4 INJECTION INTRAMUSCULAR; INTRAVENOUS EVERY 6 HOURS PRN
Status: DISCONTINUED | OUTPATIENT
Start: 2017-04-02 | End: 2017-04-06 | Stop reason: HOSPADM

## 2017-04-02 RX ADMIN — METOPROLOL TARTRATE 25 MG: 25 TABLET ORAL at 09:15

## 2017-04-02 RX ADMIN — ATORVASTATIN CALCIUM 40 MG: 40 TABLET, FILM COATED ORAL at 09:15

## 2017-04-02 RX ADMIN — SODIUM CHLORIDE: 9 INJECTION, SOLUTION INTRAVENOUS at 06:01

## 2017-04-02 RX ADMIN — HEPARIN SODIUM 600 UNITS/HR: 10000 INJECTION, SOLUTION INTRAVENOUS at 12:14

## 2017-04-02 RX ADMIN — Medication 3800 UNITS: at 20:58

## 2017-04-02 RX ADMIN — HEPARIN SODIUM 850 UNITS/HR: 10000 INJECTION, SOLUTION INTRAVENOUS at 04:43

## 2017-04-02 RX ADMIN — ASPIRIN 81 MG CHEWABLE TABLET 324 MG: 81 TABLET CHEWABLE at 03:18

## 2017-04-02 RX ADMIN — HYDROCODONE BITARTRATE AND ACETAMINOPHEN 1 TABLET: 5; 325 TABLET ORAL at 09:26

## 2017-04-02 RX ADMIN — METOPROLOL TARTRATE 25 MG: 25 TABLET ORAL at 20:34

## 2017-04-02 RX ADMIN — AMLODIPINE BESYLATE 5 MG: 5 TABLET ORAL at 09:15

## 2017-04-02 RX ADMIN — Medication 4400 UNITS: at 04:44

## 2017-04-02 RX ADMIN — SODIUM CHLORIDE: 9 INJECTION, SOLUTION INTRAVENOUS at 19:01

## 2017-04-02 RX ADMIN — ACETAMINOPHEN 650 MG: 325 TABLET, FILM COATED ORAL at 06:00

## 2017-04-02 RX ADMIN — NITROGLYCERIN 0.4 MG: 0.4 TABLET SUBLINGUAL at 03:19

## 2017-04-02 RX ADMIN — LISINOPRIL 2.5 MG: 2.5 TABLET ORAL at 09:21

## 2017-04-02 ASSESSMENT — ENCOUNTER SYMPTOMS: NUMBNESS: 1

## 2017-04-02 NOTE — PLAN OF CARE
Problem: Goal Outcome Summary  Goal: Goal Outcome Summary  VSS, A/O x4, LS Coarse in lower lobes, Sats 96%on RA, Admitted from ED @ 0530, Hep gtt infusing 850 units/hr, IVF infusing, Tylenol given for mild chest discomfort that he came from the ED with, up with stand by assist and walker, has bad knees, bruises and heasling incisions on the abdomen from SBO surgery 1 week ago, cards consult in AM, Hep 10a ordered for AM draw

## 2017-04-02 NOTE — IP AVS SNAPSHOT
MRN:[id number]                      After Visit Summary   4/2/2017    Pablo Fuentes    MRN: [id number]           Thank you!     Thank you for choosing [facility] for your care. Our goal is always to provide you with excellent care. Hearing back from our patients is one way we can continue to improve our services. Please take a few minutes to complete the written survey that you may receive in the mail after you visit. If you would like to speak to someone directly about your visit please contact Patient Relations at [phone]. Thank you!          Patient Information     Date Of Birth          1940        Designated Caregiver       Most Recent Value    Caregiver    Will someone help with your care after discharge? yes    Name of designated caregiver Tess Fuentes    Phone number of caregiver [phone]    Caregiver address [address] [place] [address]      About your hospital stay     You were admitted on:  April 2, 2017 You last received care in the:  [facility] [address] [facility]    You were discharged on:  April 6, 2017        Reason for your hospital stay       NSTEMI  New onset  Afib.                  Who to Call     For medical emergencies, please call 911.  For non-urgent questions about your medical care, please call your primary care provider or clinic, [phone]          Attending Provider     Provider Specialty    Hung Marcial MD Emergency Medicine    [facility], Alireza Gambino MD Internal Medicine       Primary Care Provider Office Phone # Fax #    Isaac CALDWELL Amadounoe  [phone]       [facility] [address] [address] [facility] [address]         When to contact your care team       Call your primary doctor if you have any of the following:  increased shortness of breath or HR > 110, <50. SBP < 90.                  After Care Instructions     Activity       Your activity upon discharge: activity as tolerated             Diet       Follow this diet upon discharge: Orders Placed This Encounter      Low Saturated Fat Na <2400 mg            Monitor and record       blood pressure daily  pulse daily  weight every other day                  Follow-up Appointments     Follow-up and recommended labs and tests        Follow up with primary care provider, Isaac Nunez, within 7 days to evaluate medication change and for hospital follow- up cardiac rehab as instructed.   Follow up with cardiology group as instructed.  Follow up Lipid profile 2 months.                  Your next 10 appointments already scheduled     Apr 18, 2017 12:45 PM CDT   LAB with RU LAB   [facility] ([facility])    [address] [place] [address]   [phone]           Patient must bring picture ID.  Patient should be prepared to give a urine specimen  Please do not eat 10-12 hours before your appointment if you are coming in fasting for labs on lipids, cholesterol, or glucose (sugar).  Pregnant women should follow their Care Team instructions. Water with medications is okay. Do not drink coffee or other fluids.   If you have concerns about taking  your medications, please ask at office or if scheduling via dotCloud, send a message by clicking on Secure Messaging, Message Your Care Team.            Apr 18, 2017  1:50 PM CDT   Return Visit with Lily Jacobo PA-C   [facility] ([facility])    [address] [place] [address]   [phone]            Jun 09, 2017  9:45 AM CDT   LAB with RU LAB   [facility] ([facility])    [address] [place] [place] [address]   [phone]           Patient must bring picture ID.  Patient should be prepared to give a urine specimen  Please do not eat 10-12 hours before your appointment if you are coming in fasting  for labs on lipids, cholesterol, or glucose (sugar).  Pregnant women should follow their Care Team instructions. Water with medications is okay. Do not drink coffee or other fluids.   If you have concerns about taking  your medications, please ask at office or if scheduling via WinLocal, send a message by clicking on Secure Messaging, Message Your Care Team.            Jun 09, 2017 10:45 AM CDT   Return Visit with Miguel Benites MD   [facility] ([facility])    [address] [facility] [address]  [facility] [address] [phone]              Additional Services     CARDIAC REHAB REFERRAL       Please be aware that coverage of these services is subject to the terms and limitations of your health insurance plan. Call member services at your health plan with any benefit or coverage questions.    Order is sent electronically to central rehab scheduling. Call [phone] if you haven't been contacted regarding these appointments within 2 business days of discharge.            Cardiac Rehab Referral       Your provider has referred you to: [facility]: [facility] [phone]   [email or web address]            Follow-Up with Cardiac Advanced Practice Provider           Follow-Up with Cardiac Advanced Practice Provider           Follow-Up with Cardiologist           Follow-Up with Cardiologist                 Future tests that were ordered for you     Basic metabolic panel           Basic metabolic panel           ALT       Last Lab Result: ALT (U/L)       Date                     Value                 03/23/2017               25               ----------            Lipid Profile           ALT       Last Lab Result: ALT (U/L)       Date                     Value                 03/23/2017               25               ----------            Lipid Profile                 Further instructions from your care team       After  "your  Coronary Artery procedure limit your activity during your first few days at home.Do not strain or lift heavy objects.For 48 hrs DO NOT: drive car,lift over 3-5 lbs,do housework, yardwork,stooping or squatting, have sexual intercourse or drink alcohol. Have an adult stay with you overnight. A small bruise or lump at the insertion site is common. Call your Dr. if swelling or bruising increases or the leg(or arm) in which the catheter was inserted feels cold or numb. For leg,If bleeding occurs lay down and apply firm pressure and call 911.For wrist, sit and apply pressure for 10 min,if bleeding does not stop, call 911. Call your caregiver if the site becomes more painful or for a fever >100.5.Do not have MRI test for 3 months after stent unless ok with cardiology.        Pending Results     No orders found from 3/31/2017 to 4/3/2017.            Statement of Approval     Ordered          04/06/17 1201  I have reviewed and agree with all the recommendations and orders detailed in this document.  EFFECTIVE NOW     Approved and electronically signed by:  Janusz Soares MD             Admission Information     Date & Time Department Dept. Phone    4/2/2017 [place] [address] [facility] [phone]      Your Vitals Were     Blood Pressure Pulse Temperature Respirations Height Weight    107/69 (BP Location: Left arm) 71 96.2  F (35.7  C) (Oral) 16 1.753 m (5' 9\") 76.5 kg (168 lb 9.6 oz)    Pulse Oximetry BMI (Body Mass Index)                95% 24.9 kg/m2          MustHaveMenus Information     MustHaveMenus lets you send messages to your doctor, view your test results, renew your prescriptions, schedule appointments and more. To sign up, go to [email or web address] . Click on \"Log in\" on the left side of the screen, which will take you to the Welcome page. Then click on \"Sign up Now\" on the right side of the page.     You will be asked to enter the access code listed below, as well as some personal information. " Please follow the directions to create your username and password.     Your access code is: [email or web address]  Expires: 2017 10:43 AM     Your access code will  in 90 days. If you need help or a new code, please call your [facility] clinic or [phone].        Care EveryWhere ID     This is your Care EveryWhere ID. This could be used by other organizations to access your [facility] medical records  [id number]           Review of your medicines      START taking        Dose / Directions    acetaminophen 325 MG tablet   Commonly known as:  TYLENOL   Used for:  Abdominal pain, left upper quadrant        Dose:  325-650 mg   Take 1-2 tablets (325-650 mg) by mouth every 4 hours as needed for mild pain or headaches   Quantity:  100 tablet   Refills:  0       aspirin 81 MG EC tablet   Used for:  NSTEMI (non-ST elevated myocardial infarction) (H)        Dose:  81 mg   Take 1 tablet (81 mg) by mouth daily   Quantity:  120 tablet   Refills:  11       atorvastatin 40 MG tablet   Commonly known as:  LIPITOR   Used for:  NSTEMI (non-ST elevated myocardial infarction) (H)        Dose:  40 mg   Take 1 tablet (40 mg) by mouth daily   Quantity:  30 tablet   Refills:  3       lisinopril 10 MG tablet   Commonly known as:  PRINIVIL/ZESTRIL   Used for:  NSTEMI (non-ST elevated myocardial infarction) (H), Essential hypertension        Dose:  10 mg   Take 1 tablet (10 mg) by mouth daily   Quantity:  30 tablet   Refills:  3       LORazepam 0.5 MG tablet   Commonly known as:  ATIVAN   Used for:  Anxiety        Dose:  0.5 mg   Take 1 tablet (0.5 mg) by mouth every 8 hours as needed for anxiety   Quantity:  21 tablet   Refills:  0       nitroglycerin 0.4 MG sublingual tablet   Commonly known as:  NITROSTAT   Used for:  NSTEMI (non-ST elevated myocardial infarction) (H)        For chest pain place 1 tablet under the tongue every 5 minutes for 3 doses. If symptoms persist 5 minutes after 1st dose call 911.   Quantity:  25 tablet   Refills:   0       ticagrelor 90 MG tablet   Commonly known as:  BRILINTA   Used for:  NSTEMI (non-ST elevated myocardial infarction) (H)        Dose:  90 mg   Take 1 tablet (90 mg) by mouth every 12 hours   Quantity:  60 tablet   Refills:  11         CONTINUE these medicines which have NOT CHANGED        Dose / Directions    BIOTIN MAXIMUM STRENGTH 10 MG Tabs tablet   Generic drug:  Biotin        Dose:  32047 mcg   Take 10,000 mcg by mouth daily   Refills:  0       Cranberry 300 MG Tabs   Indication:  UTI prophylaxis        Dose:  300 mg   Take 300 mg by mouth daily   Refills:  0       HYDROcodone-acetaminophen 5-325 MG per tablet   Commonly known as:  NORCO   Used for:  Small bowel obstruction (H)        Dose:  1-2 tablet   Take 1-2 tablets by mouth every 4 hours as needed for moderate to severe pain   Quantity:  20 tablet   Refills:  0       LOPRESSOR PO        Dose:  25 mg   Take 25 mg by mouth 2 times daily   Refills:  0       * OCUVITE PO        Dose:  1 tablet   Take 1 tablet by mouth daily   Refills:  0       * CENTRUM SILVER per tablet        Dose:  1 tablet   Take 1 tablet by mouth daily   Refills:  0       * Notice:  This list has 2 medication(s) that are the same as other medications prescribed for you. Read the directions carefully, and ask your doctor or other care provider to review them with you.      STOP taking     NORVASC PO           VANQUISH [id number] MG Tabs   Generic drug:  ASA-APAP-Caff Buffered                Where to get your medicines      Some of these will need a paper prescription and others can be bought over the counter. Ask your nurse if you have questions.     Bring a paper prescription for each of these medications     aspirin 81 MG EC tablet    atorvastatin 40 MG tablet    lisinopril 10 MG tablet    LORazepam 0.5 MG tablet    nitroglycerin 0.4 MG sublingual tablet    ticagrelor 90 MG tablet       You don't need a prescription for these medications     acetaminophen 325 MG tablet                 Protect others around you: Learn how to safely use, store and throw away your medicines at www.disposemymeds.org.             Medication List: This is a list of all your medications and when to take them. Check marks below indicate your daily home schedule. Keep this list as a reference.      Medications           Morning Afternoon Evening Bedtime As Needed    acetaminophen 325 MG tablet   Commonly known as:  TYLENOL   Take 1-2 tablets (325-650 mg) by mouth every 4 hours as needed for mild pain or headaches   Last time this was given:  650 mg on 4/3/2017  5:52 PM                                   aspirin 81 MG EC tablet   Take 1 tablet (81 mg) by mouth daily   Last time this was given:  81 mg on 4/6/2017  8:53 AM   Next Dose Due:  04/07                                   atorvastatin 40 MG tablet   Commonly known as:  LIPITOR   Take 1 tablet (40 mg) by mouth daily   Last time this was given:  40 mg on 4/6/2017  8:53 AM   Next Dose Due:  04/07                                     BIOTIN MAXIMUM STRENGTH 10 MG Tabs tablet   Take 10,000 mcg by mouth daily   Generic drug:  Biotin                                   Cranberry 300 MG Tabs   Take 300 mg by mouth daily                                   HYDROcodone-acetaminophen 5-325 MG per tablet   Commonly known as:  NORCO   Take 1-2 tablets by mouth every 4 hours as needed for moderate to severe pain   Last time this was given:  2 tablets on 4/4/2017  3:32 AM                                   lisinopril 10 MG tablet   Commonly known as:  PRINIVIL/ZESTRIL   Take 1 tablet (10 mg) by mouth daily   Last time this was given:  20 mg on 4/6/2017  8:53 AM   Next Dose Due:  04/07                                     LOPRESSOR PO   Take 25 mg by mouth 2 times daily   Last time this was given:  25 mg on 4/6/2017  8:53 AM   Next Dose Due:  This evening 04/06                                      LORazepam 0.5 MG tablet   Commonly known as:  ATIVAN   Take 1 tablet (0.5 mg) by mouth  every 8 hours as needed for anxiety   Last time this was given:  0.5 mg on 4/6/2017  7:00 AM                                   nitroglycerin 0.4 MG sublingual tablet   Commonly known as:  NITROSTAT   For chest pain place 1 tablet under the tongue every 5 minutes for 3 doses. If symptoms persist 5 minutes after 1st dose call 911.   Last time this was given:  0.4 mg on 4/2/2017  3:19 AM                                   * OCUVITE PO   Take 1 tablet by mouth daily                                   * CENTRUM SILVER per tablet   Take 1 tablet by mouth daily                                   ticagrelor 90 MG tablet   Commonly known as:  BRILINTA   Take 1 tablet (90 mg) by mouth every 12 hours   Last time this was given:  90 mg on 4/6/2017 10:27 AM   Next Dose Due:  04/06 at bedtime                                      * Notice:  This list has 2 medication(s) that are the same as other medications prescribed for you. Read the directions carefully, and ask your doctor or other care provider to review them with you.

## 2017-04-02 NOTE — ED PROVIDER NOTES
History     Chief Complaint:  Chest Pain    HPI   Pablo Fuentes is a 76 year old male who presents with chest pain. The patient had an onset of left sided chest pain at 1800 that is constant. The patient notes that he ate shrimp. The patient had a bowel movement today but notes not being able to pass gas. The patient notes that he had associated numbness to his left arm. Currently 4/10 in severity. It is mildly better now than it was at onset. The patient notes taking Gas-X and Mylanta to help relieve the pain. Of note, the patient had bowel surgery about a week ago due to inadequate circulation to his bowels. No bowel was resected.    Allergies:  No known drug allergies.    Medications:    Acetaminophen  Norco  ASA-APAP-Caff Buffered  Biotin  Cranberry  Multivitamin  Amlodipine  Metroprolol     Past Medical History:    Hypertension    Past Surgical History:    Bronchoscopy flexible  EGD  Eye surgery  Hernia repair  Laparoscopic assisted insertion tube gastrotomy  Laparoscopic herniorrhaphy giant  Laparoscopic diagnostic (general)    Family History:    History reviewed. No pertinent family history.    Social History:  Marital Status:   Presents to the ED with wife  PCP: Isaac Nunez     Review of Systems   Cardiovascular: Positive for chest pain (left side).   Neurological: Positive for numbness (left arm).   All other systems reviewed and are negative.    Physical Exam   First Vitals:  BP: (!) 194/120  Pulse: 76  Heart Rate: 71  Temp: 97.9  F (36.6  C)  Resp: 18  SpO2: 95 %    Physical Exam  Nursing note and vitals reviewed.  Constitutional: Cooperative.   HENT:   Mouth/Throat: Moist mucous membranes.   Eyes: EOMI, nonicteric sclera  Cardiovascular: Normal rate, regular rhythm with occasional extra beat, no murmurs, rubs, or gallops  Pulmonary/Chest: Effort normal and breath sounds normal. No respiratory distress. No wheezes. No rales.   Abdominal: Soft. Nontender, nondistended, no guarding or  rigidity. BS present. Laparscopy incisions are well-healed without erythema or discharge.   Musculoskeletal: Normal range of motion.   Neurological: Alert. Moves all extremities spontaneously.   Skin: Skin is warm and dry. No rash noted.   Psychiatric: Normal mood and affect.       Emergency Department Course   ECG:  @ 0248  Indication: Chest pain  Vent. Rate 73 bpm. ME interval 182 ms. QRS duration 94 ms. QT/QTc 402/442 ms. P-R-T axis -17 -22 -25.   Sinus rhythm with premature supraventricular complexes. Abnormal ECG. Nonspecific ST abnormality. Mild ST depression in anterior leads. No significant change compared to EKG dated 3/23/2017.  Read @ 0255 by Dr. Marcial.    @ 0408  Indication: Chest pain  Vent. Rate 78 bpm. ME interval 184 ms. QRS duration 102 ms. QT/QTc 414/471 ms. P-R-T axis -15 -21 -28.   Sinus rhythm with premature atrial complexes. Nonspecific ST abnormality. Abnormal ECG.  No significant change when compared to previous ECG from 04/02/2017   Read @ 0417 by Dr. Marcial.    Imaging:  Radiographic findings were communicated with the patient who voiced understanding of the findings.    XR Chest 2 Views   Final Result   IMPRESSION: No convincing evidence of active cardiopulmonary disease.      TAMMIE VILLASENOR MD        All Readings Per Radiology Unless Otherwise Noted.    Laboratory:  CBC: WBC 8.0, HGB 16.3,   BMP: Glc 126 (H), Rest WNL (Creatinine 0.80)  (0254) Troponin I: 0.173 (HH)    Interventions:  (0318) Aspirin, 324 mg, PO  (0319) Nitroglycerin SL 0.4 mg, PO    ED Course:  Nursing notes and past medical history reviewed.   I performed a physical examination of the patient as documented above.  I explained the plan with the patient and family who consents to this.  EKG was done, interpretation as above.  The patient was sent for a chest x-ray while in the emergency department, findings above.  Blood was drawn from the patient. This was sent for laboratory testing, findings above.   I  personally reviewed the laboratory results with the patient and family and answered all related questions prior to admission.  Findings and plan explained to the patient who consents to admission.   (6992) I discussed the patient with Dr. Keen of the hospitalist service, who will admit the patient to a cardiac telemetry bed for further monitoring, evaluation, and treatment.    Impression & Plan    Medical Decision Making:  Pablo Fuentes is a 76 year old male who presents with left lower chest pain. Exam is overall unremarkable. EKG is concerning for some mild ST elevation in I and AVL (less than 1 mm) in addition to some mild ST depressions in the anterior leads. Troponin is elevated suggestive of a NSTEMI. I did repeat the EKG and there are no dynamic changes. Patient's pain was attempted to be controlled with Nitroglycerin, however, has not yet been successful. We will plan on giving him morphine if nitroglycerin is not successful. Heparin drip initiated here in the ED. Chest x-ray unremarkable without any mediastinal widening to suggest other cause of troponin elevation. I spoke with Dr. Keen of the hospitalist service who accepts the patient for admission. All of patient's and his wife's questions were answered prior to admission and they are in agreement with the plan.    Diagnosis:    ICD-10-CM    1. NSTEMI (non-ST elevated myocardial infarction) (H) I21.4        Disposition:   Admitted to cardiac telemetry.      Mina MCDONALD, am serving as a scribe on 4/2/2017 at 3:05 AM to personally document services performed by Hung Marcial MD, based on my observations and the provider's statements to me.       Hung Marcial MD  04/02/17 0547

## 2017-04-02 NOTE — PROGRESS NOTES
Patient admitted by my colleague this AM.  I checked on him this afternoon and he feels well.  He denies chest/abd pain.  No SOB.  Exam stable.      Imp:  1.  NSTEMI:  -  Appreciate cardiology consult.  Plan for cardiac cath in the AM noted.  If new chest pain cardiology wanted to be notified and consideration for a more urgent cath/transfer to [facility] would need to be considered.  -  Continue medical management with ASA, heparin drip, ACEI, beta blocker, statin.  -  Trend troponin    2.  Recent SBO with lysis of adhesions:  -  No GI complaints, tolerating po intake.      Recent Labs  Lab 04/02/17  0804 04/02/17  0254   WBC 6.2 8.0   HGB 15.3 16.3   HCT 42.5 46.9   MCV 96 97   * 211       Recent Labs  Lab 04/02/17  0254      POTASSIUM 3.9   CHLORIDE 105   CO2 25   ANIONGAP 10   *   BUN 17   CR 0.80   GFRESTIMATED >90Non  GFR Calc   GFRESTBLACK >90African American GFR Calc   JOANN 8.5       Recent Labs  Lab 04/02/17  0804   CHOL 129   HDL 44   LDL 68   TRIG 83       Recent Labs  Lab 04/02/17  1135 04/02/17  0804 04/02/17  0254   TROPI 10.457* 2.200* 0.173*

## 2017-04-02 NOTE — H&P
"CHIEF COMPLAINT:  Chest pain.      HISTORY OF PRESENT ILLNESS:  Mr. Pablo Fuentes is a 76-year-old male with a recent hospitalization from 03/23/2017 through 03/26/2017, who presented to the hospital this evening for chest pain.  The patient reports that while he was taking a shower at around 6:00 p.m., he began to develop some chest discomfort.  He points to the area underneath his left breast in his lower rib cage area on the left side as a source for his pain.  He describes it as a dull sensation.  His chest pain has gotten better since it started, and has been coming and going during the evening.  He has not noticed any other symptoms beyond an episode of emesis.  His wife, however, did remark that she felt he looked short of breath while he was walking up some stairs, although he did not feel particularly dyspneic.  The patient thought that the pain was \"gas pain,\" and he took a number of medications including Gas-X at home without any relief.  He also apparently had some left arm numbness which he and his wife think was likely positional, and has since resolved.  He has no cardiac history in the past.      On arrival to the ER this evening, vital signs included a blood pressure initially of 170/100 with a heart rate of 76.  Afebrile.  Saturation 95% on room air.  Workup in the ER included basic labs.  Troponin was 0.17.  CBC normal.  BMP normal.  Chest x-ray showed no acute findings.      EKG was performed which by my read shows sinus rhythm.  He has some very slight ST elevation in leads I and aVL, which may be J point elevation.  I did review previous EKGs, and there is an EKG from 02/17/2015 that appears similar by my read.      The patient was recently admitted to the hospital from 03/23/2017 through 03/26/2017 for abdominal pain.  He was found to have a small bowel obstruction.  He was seen by General Surgery and taken to the operating room by Dr. Garcia.  He underwent lysis of adhesions.  He was found to " have a hemorrhagic bowel loop which was felt related to obstruction from an adhesive band.  He did not require a bowel resection.  Since that discharge from the hospital on 03/26, he has been otherwise feeling well and has been having bowel movements since he has gotten home.      I spoke to Dr. Marcial of the ER upon admission of this patient to the hospital.      PAST MEDICAL HISTORY:   1.  Hypertension.   2.  Small bowel obstruction, status post lysis of adhesions.   3.  Recent hospitalization one week ago, as detailed above.   4.  Cholelithiasis.   5.  Diverticulosis.   6.  Hiatal hernia.  This was apparently an incarcerated large paraesophageal hernia requiring laparoscopic reduction with percutaneous endoscopic gastrostomy tube placement, bilateral tube thoracotomies, and laparoscopic jejunostomy feeding tube placement.  This occurred approximately two years ago.      CURRENT OUTPATIENT MEDICATIONS:   1.  Acetaminophen.   2.  Amlodipine 5 mg daily.   3.  Vanquish.   4.  Biotin.   5.  Cranberry.   6.  Norco.   7.  Metoprolol.   8.  Multivitamin.      ALLERGIES:  None.      FAMILY HISTORY:  No cardiac disease in the family.      SOCIAL HISTORY:  The patient quit smoking 40 years ago.  Occasional alcohol use, but nothing excessive and not a daily occurrence.      REVIEW OF SYSTEMS:  See HPI for details.  Comprehensive greater than 10-point review of systems otherwise negative besides that detailed above.      PHYSICAL EXAMINATION:   VITAL SIGNS:  Blood pressure is 140/90 with a heart rate of 85.  Afebrile.  Saturation 93% on room air.   GENERAL:  The patient appears nontoxic and in no acute distress.  He appears awake, alert and oriented x3.  Does not appear anxious.  Wife is present at bedside.   HEENT:  Head is atraumatic.  Sclerae white.  Eyelids normal.  Conjunctivae normal.  Extraocular movements intact.   NECK:  Supple.  No cervical or supraclavicular lymphadenopathy.   HEART:  Regular rate and rhythm.   No significant murmurs.  No lower extremity edema.   LUNGS:  Clear to auscultation bilaterally.  No intercostal retractions.  No conversational dyspnea.   ABDOMEN:  Nontender, nondistended.  Soft.  No masses.  No organomegaly.   EXTREMITIES:  No edema.   SKIN:  No rash.  No jaundice.  Skin is dry to touch.  Abdominal incisions from recent laparoscopy appear to be healing well.   NEUROLOGICAL:  Cranial nerves II-XII appear to be intact.  Moves all extremities appropriately.  Sensation intact to light touch in the upper and lower extremities bilaterally.   PSYCHIATRIC:  The patient is awake, alert and oriented x3.  Mood and affect are normal and appropriate.      LABORATORY AND IMAGING DATA:  Reviewed above in HPI.  EKG personally reviewed, as above.      IMPRESSION:  Mr. Fuentes is a 76-year-old male with a history of hypertension, recent admission to the hospital one week ago for a small bowel obstruction for which he underwent lysis of adhesions, who presents to the hospital this evening for concerns about chest pain.  Onset of chest pain was approximately 10 hours ago.  Symptoms have improved since initially starting, and have been intermittent since his time in the Emergency Department.  Workup has included EKG showing sinus rhythm with very slight ST elevation in leads I and aVL, as well as a mildly elevated troponin.   1.  Chest pain, concerning for acute coronary syndrome.  EKG shows a very slight ST segment elevation in leads I and aVL, which appears most consistent with J point elevation by my eye, and appeared to be present on a previous EKG from 02/17/2015.   2.  Hypertension history.   3.  Recent admission to the hospital for small bowel obstruction and underwent lysis of adhesions.   4.  Hiatal hernia, status post laparoscopic surgery.      PLAN:   1.  Admit to inpatient status.   2.  Heparin drip.   3.  Aspirin.   4.  Metoprolol.   5.  Lipitor.   6.  Keep n.p.o.   7.  Cardiology consultation.   8.   Serial troponin enzymes.   9.  Telemetry monitoring.   10.  To be admitted as inpatient, given concerns about possible acute coronary syndrome and need for heparin drip.         KARL TAYLOR MD             D: 2017 04:52   T: 2017 06:34   MT: [id number]      Name:     LUH ZELAYA   MRN:      [id number]        Account:      [id number]   :      1940           Admitted:     [id number]      Document: [id number]       cc: Isaac Nunez DO

## 2017-04-02 NOTE — PLAN OF CARE
Problem: Goal Outcome Summary  Goal: Goal Outcome Summary  Outcome: Improving  Patient denied chest pain or discomfort, troponin 10.457 called and MD notified- troponin recheck ordered at 1900. Heparin gtt 600 units/h , level recheck at 1900. Continue to monitor.

## 2017-04-02 NOTE — IP AVS SNAPSHOT
[place] [facility] [address] [facility]    [address]    [facility] [address]    Phone:  [phone]    Fax:  [phone]                                       After Visit Summary   4/2/2017    Pablo Fuentes    MRN: [id number]           After Visit Summary Signature Page     I have received my discharge instructions, and my questions have been answered. I have discussed any challenges I see with this plan with the nurse or doctor.    ..........................................................................................................................................  Patient/Patient Representative Signature      ..........................................................................................................................................  Patient Representative Print Name and Relationship to Patient    ..................................................               ................................................  Date                                            Time    ..........................................................................................................................................  Reviewed by Signature/Title    ...................................................              ..............................................  Date                                                            Time

## 2017-04-02 NOTE — CONSULTS
CARDIOLOGY CONSULTATION       REASON FOR CARDIOLOGY CONSULT:  Chest pain.      CHIEF COMPLAINT:  Chest pain.      HISTORY OF PRESENT ILLNESS:  Mr. Pablo Fuentes is a very pleasant 76-year-old gentleman with a history of hypertension with recent hospitalization for small-bowel obstruction and underwent surgery with lysis of adhesions and it was felt that the obstruction was from an adhesive band, he did not require any bowel resection.  He has other comorbidities of hypertension on medication, no history of diabetes, dyslipidemia or tobacco exposure.  The patient is now admitted with chest discomfort.  He tells me that yesterday around 2:00, he had a moderately intense chest discomfort which he felt like as if there is a gas bubble and the discomfort persisted for about 12 hours.  He also threw up once.  He did not feel any shortness of breath or any diaphoresis.  Eventually the discomfort slowly resolved.  It looks like he was given sublingual nitroglycerin with mixed effect.  At the time of this review, the patient feels comfortable, intermittently he can have very mild 1/10 chest discomfort.  The patient tells me that sometimes he gets this kind of pain due to inability to pass flatus, but yesterday he was able to pass flatus and that did not change his symptoms.  And also the symptom yesterday was much more intense than he ever had in terms of gas related pain.  He had EKGs done that were personally reviewed by me; the first EKG was done last night around 2:40 a.m. and I see about 0.5 mm ST depression in lead V2-V4 and subtle ST depression in lead II, III and aVF.  There is J-point elevation in lead I and aVL.  Subsequent EKG done at 4:00 a.m. yesterday showed about 1 mm ST depression in lead V3 to around 0.5 mm ST depression in V4 and V5.  Again, J-point elevation in lead aVL and lead I is seen.  Compared to previous EKG about a week ago, the ST changes in precordial lead appear new.      The patient denies any  bleeding issues; there are no more anticipated surgeries.  His initial troponin was 0.173 and then went up to 2.2.      REVIEW OF SYSTEMS:  A complete review of systems was performed and was negative except as above in HPI.      PAST MEDICAL HISTORY:   1.  Recent hospitalization with small-bowel obstruction, status post enterolysis.  No bowel resection was needed.  No more anticipated surgery according to the patient.   2.  Hypertension.   3.  Cholelithiasis.   4.  History of hiatal hernia.      ALLERGIES:  No known drug allergies.      CURRENT MEDICATIONS:   1.  Amlodipine 5 mg daily.   2.  Aspirin 325 mg daily.   3.  Lipitor 40 mg daily.   4.  Heparin drip.   5.  Metoprolol tartrate 25 mg b.i.d.      SOCIAL HISTORY:  No current tobacco exposure.  It looks like the patient quit tobacco about 40 years ago.      FAMILY HISTORY:  No family history of known premature coronary artery disease.  It looks like brother had some kind of carotid artery disease, but details are not known.      PHYSICAL EXAMINATION:   VITAL SIGNS:  Blood pressure 157/81, at admission vitals blood pressure was 194/120.  Heart rate currently 66, respiratory rate 16, 94% on room air.   GENERAL:  The patient appears pleasant, comfortable.   NECK:  Normal JVP, no bruit.   CARDIOVASCULAR:  S1, S2 normal, no murmur, rub or gallop.   RESPIRATORY:  Clear to auscultation bilaterally.   ABDOMEN:  Soft, nontender, healing laparoscopic surgical scar seen.   EXTREMITIES:  No pitting pedal edema.   NEUROLOGIC:  Alert and oriented x3.   PSYCHIATRIC:  Normal affect.   SKIN:  Normal obvious rashes.   HEENT:  No pallor or icterus.      PERTINENT LABORATORY:  Initial troponin 0.173, now 2.20.  CBC essentially normal.  BMP essentially normal.  EKG as noted above shows some 0.5-1 mm ST depression in precordial leads.  There appears to be J-point elevation in lead I and aVL.        Chest x-ray hyperinflated lungs.  A few linear opacities in bilateral lung bases,  likely representative atelectasis.  No mediastinal widening.      IMPRESSION AND PLAN:  A very delightful 76-year-old gentleman with coronary artery disease risk factors of hypertension with recent small-bowel obstruction surgery who is now admitted with chest discomfort with electrocardiogram changes and rise in troponin consistent with a non-ST elevation myocardial infarction.  I had a long discussion with the patient and his family regarding his clinical presentation.  We discussed the option of medical management versus medical management plus coronary angiogram.  We discussed risks and benefits of coronary angiogram with risks including but not limited to risk of stroke, myocardial infarction, death, need for PRBC transfusion, emergent bypass surgery.  It looks like no more anticipated surgeries and there are no bleeding issues and he has been tolerating heparin drip quite well.  If needed, he will be a reasonable candidate for long-term dual antiplatelet therapy.  I also recommend adding lisinopril given acute coronary syndrome and still elevated blood pressure.  I also recommend an echocardiogram.  The patient understands the rationale of coronary angiogram, the alternative of only medical therapy, the risk involved and wishes to proceed.  He should be n.p.o. from early tomorrow morning, around 5:00 or 6:00 a.m.   1.  Non-ST elevation myocardial infarction.   2.  Hypertension.   3.  Recent small-bowel obstruction surgery.     4.  LDL 68.      RECOMMENDATION:   1.  Continue aspirin, beta blocker, heparin drip, amlodipine.   2.  Add lisinopril 2.5 mg daily.   3.  Coronary angiogram with possible revascularization.   4.  Keep n.p.o. from early tomorrow morning.   5.  Echocardiogram.   6.  The plan was extensively discussed with the patient and his family.         LILA TAN MD             D: 04/02/2017 09:20   T: 04/02/2017 10:28   MT: [id number]      Name:     LUH ZELAYA   MRN:      [id number]         Account:       [id number]   :      1940           Consult Date:  2017      Document: [id number]

## 2017-04-02 NOTE — PHARMACY-ADMISSION MEDICATION HISTORY
Admission medication history interview status for this patient is complete. See [facility] admission navigator for allergy information, prior to admission medications and immunization status.     Medication history interview source(s):Patient & wife  Medication history resources (including written lists, pill bottles, clinic record):None  Primary pharmacy: Not ID'd    Changes made to PTA medication list:  Added: Centrum Silver  Deleted: Acetaminophen, Thera-Vit-M  Changed: Biotin, Cranberry, Ocuvite    Actions taken by pharmacist (provider contacted, etc):None     Additional medication history information:None    Medication reconciliation/reorder completed by provider prior to medication history? Yes      Prior to Admission medications    Medication Sig Last Dose Taking? Auth Provider   Biotin (BIOTIN MAXIMUM STRENGTH) 10 MG TABS tablet Take 10,000 mcg by mouth daily 4/1/2017 at am Yes Unknown, Entered By History   Cranberry 300 MG TABS Take 300 mg by mouth daily 4/1/2017 at am Yes Unknown, Entered By History   Multiple Vitamins-Minerals (CENTRUM SILVER) per tablet Take 1 tablet by mouth daily 4/1/2017 at am Yes Unknown, Entered By History   HYDROcodone-acetaminophen (NORCO) 5-325 MG per tablet Take 1-2 tablets by mouth every 4 hours as needed for moderate to severe pain 4/1/2017 at Unknown time Yes Marietta Landa MD   ASA-APAP-Caff Buffered (VANQUISH) [phone] MG TABS Take 1 tablet by mouth 3 times daily as needed (headache)   Yes Unknown, Entered By History   Multiple Vitamins-Minerals (OCUVITE PO) Take 1 tablet by mouth daily  4/1/2017 at am Yes Unknown, Entered By History   AmLODIPine Besylate (NORVASC PO) Take 5 mg by mouth daily 4/1/2017 at am Yes Reported, Patient   Metoprolol Tartrate (LOPRESSOR PO) Take 25 mg by mouth 2 times daily 4/1/2017 at pm Yes Reported, Patient

## 2017-04-03 ENCOUNTER — APPOINTMENT (OUTPATIENT)
Dept: CARDIOLOGY | Facility: CLINIC | Age: 77
DRG: 247 | End: 2017-04-03
Attending: INTERNAL MEDICINE
Payer: MEDICARE

## 2017-04-03 LAB
APTT PPP: 218 SEC (ref 22–37)
APTT PPP: 39 SEC (ref 22–37)
KCT BLD-ACNC: 281 SEC (ref 105–167)
LMWH PPP CHRO-ACNC: 0.19 IU/ML
LMWH PPP CHRO-ACNC: 0.25 IU/ML
LMWH PPP CHRO-ACNC: 0.55 IU/ML
MRSA DNA SPEC QL NAA+PROBE: NORMAL
POTASSIUM SERPL-SCNC: 3.9 MMOL/L (ref 3.4–5.3)
SPECIMEN SOURCE: NORMAL

## 2017-04-03 PROCEDURE — 99152 MOD SED SAME PHYS/QHP 5/>YRS: CPT | Performed by: INTERNAL MEDICINE

## 2017-04-03 PROCEDURE — 27210827 ZZH KIT ACIST INJECTOR CR6

## 2017-04-03 PROCEDURE — C1725 CATH, TRANSLUMIN NON-LASER: HCPCS

## 2017-04-03 PROCEDURE — 36415 COLL VENOUS BLD VENIPUNCTURE: CPT | Performed by: INTERNAL MEDICINE

## 2017-04-03 PROCEDURE — 4A033BC MEASUREMENT OF ARTERIAL PRESSURE, CORONARY, PERCUTANEOUS APPROACH: ICD-10-PCS | Performed by: INTERNAL MEDICINE

## 2017-04-03 PROCEDURE — 25000128 H RX IP 250 OP 636: Performed by: INTERNAL MEDICINE

## 2017-04-03 PROCEDURE — 85347 COAGULATION TIME ACTIVATED: CPT

## 2017-04-03 PROCEDURE — C1769 GUIDE WIRE: HCPCS

## 2017-04-03 PROCEDURE — 85730 THROMBOPLASTIN TIME PARTIAL: CPT | Performed by: INTERNAL MEDICINE

## 2017-04-03 PROCEDURE — 27210742 ZZH CATH CR1

## 2017-04-03 PROCEDURE — 25000125 ZZHC RX 250: Performed by: INTERNAL MEDICINE

## 2017-04-03 PROCEDURE — A9270 NON-COVERED ITEM OR SERVICE: HCPCS | Mod: GY | Performed by: INTERNAL MEDICINE

## 2017-04-03 PROCEDURE — A9270 NON-COVERED ITEM OR SERVICE: HCPCS | Mod: GY | Performed by: NURSE PRACTITIONER

## 2017-04-03 PROCEDURE — 027034Z DILATION OF CORONARY ARTERY, ONE ARTERY WITH DRUG-ELUTING INTRALUMINAL DEVICE, PERCUTANEOUS APPROACH: ICD-10-PCS | Performed by: INTERNAL MEDICINE

## 2017-04-03 PROCEDURE — C1874 STENT, COATED/COV W/DEL SYS: HCPCS

## 2017-04-03 PROCEDURE — 25000132 ZZH RX MED GY IP 250 OP 250 PS 637: Mod: GY | Performed by: INTERNAL MEDICINE

## 2017-04-03 PROCEDURE — 40000275 ZZH STATISTIC RCP TIME EA 10 MIN

## 2017-04-03 PROCEDURE — 93571 IV DOP VEL&/PRESS C FLO 1ST: CPT

## 2017-04-03 PROCEDURE — 93306 TTE W/DOPPLER COMPLETE: CPT

## 2017-04-03 PROCEDURE — 84132 ASSAY OF SERUM POTASSIUM: CPT | Performed by: INTERNAL MEDICINE

## 2017-04-03 PROCEDURE — 99152 MOD SED SAME PHYS/QHP 5/>YRS: CPT

## 2017-04-03 PROCEDURE — 27210802 ZZH SHEATH CR1

## 2017-04-03 PROCEDURE — 93306 TTE W/DOPPLER COMPLETE: CPT | Mod: 26 | Performed by: INTERNAL MEDICINE

## 2017-04-03 PROCEDURE — 27210946 ZZH KIT HC TOTES DISP CR8

## 2017-04-03 PROCEDURE — C9600 PERC DRUG-EL COR STENT SING: HCPCS

## 2017-04-03 PROCEDURE — 27210759 ZZH DEVICE INFLATION CR6

## 2017-04-03 PROCEDURE — 85520 HEPARIN ASSAY: CPT | Performed by: INTERNAL MEDICINE

## 2017-04-03 PROCEDURE — 20000003 ZZH R&B ICU

## 2017-04-03 PROCEDURE — 93010 ELECTROCARDIOGRAM REPORT: CPT | Performed by: INTERNAL MEDICINE

## 2017-04-03 PROCEDURE — 99233 SBSQ HOSP IP/OBS HIGH 50: CPT | Mod: 25 | Performed by: INTERNAL MEDICINE

## 2017-04-03 PROCEDURE — 27210762 ZZH DEVICE SUTURELESS SECUREMENT EA CR2

## 2017-04-03 PROCEDURE — 87640 STAPH A DNA AMP PROBE: CPT | Performed by: INTERNAL MEDICINE

## 2017-04-03 PROCEDURE — C1887 CATHETER, GUIDING: HCPCS

## 2017-04-03 PROCEDURE — 27210856 ZZH ACCESS HEART CATH CR2

## 2017-04-03 PROCEDURE — 25000125 ZZHC RX 250

## 2017-04-03 PROCEDURE — 93454 CORONARY ARTERY ANGIO S&I: CPT

## 2017-04-03 PROCEDURE — 25000132 ZZH RX MED GY IP 250 OP 250 PS 637: Mod: GY | Performed by: NURSE PRACTITIONER

## 2017-04-03 PROCEDURE — 87641 MR-STAPH DNA AMP PROBE: CPT | Performed by: INTERNAL MEDICINE

## 2017-04-03 PROCEDURE — 25000128 H RX IP 250 OP 636

## 2017-04-03 PROCEDURE — C1894 INTRO/SHEATH, NON-LASER: HCPCS

## 2017-04-03 PROCEDURE — 92928 PRQ TCAT PLMT NTRAC ST 1 LES: CPT | Mod: LC | Performed by: INTERNAL MEDICINE

## 2017-04-03 PROCEDURE — 99233 SBSQ HOSP IP/OBS HIGH 50: CPT | Performed by: INTERNAL MEDICINE

## 2017-04-03 PROCEDURE — 93454 CORONARY ARTERY ANGIO S&I: CPT | Mod: 26 | Performed by: INTERNAL MEDICINE

## 2017-04-03 PROCEDURE — 99153 MOD SED SAME PHYS/QHP EA: CPT | Performed by: INTERNAL MEDICINE

## 2017-04-03 PROCEDURE — C9606 PERC D-E COR REVASC W AMI S: HCPCS | Mod: LC

## 2017-04-03 PROCEDURE — B2111ZZ FLUOROSCOPY OF MULTIPLE CORONARY ARTERIES USING LOW OSMOLAR CONTRAST: ICD-10-PCS | Performed by: INTERNAL MEDICINE

## 2017-04-03 PROCEDURE — 99153 MOD SED SAME PHYS/QHP EA: CPT

## 2017-04-03 PROCEDURE — 93571 IV DOP VEL&/PRESS C FLO 1ST: CPT | Mod: 26 | Performed by: INTERNAL MEDICINE

## 2017-04-03 PROCEDURE — 93005 ELECTROCARDIOGRAM TRACING: CPT

## 2017-04-03 RX ORDER — VERAPAMIL HYDROCHLORIDE 2.5 MG/ML
INJECTION, SOLUTION INTRAVENOUS
Status: DISCONTINUED
Start: 2017-04-03 | End: 2017-04-03 | Stop reason: HOSPADM

## 2017-04-03 RX ORDER — DOPAMINE HYDROCHLORIDE 160 MG/100ML
2-20 INJECTION, SOLUTION INTRAVENOUS CONTINUOUS PRN
Status: DISCONTINUED | OUTPATIENT
Start: 2017-04-03 | End: 2017-04-03 | Stop reason: HOSPADM

## 2017-04-03 RX ORDER — FLUMAZENIL 0.1 MG/ML
0.2 INJECTION, SOLUTION INTRAVENOUS
Status: DISCONTINUED | OUTPATIENT
Start: 2017-04-03 | End: 2017-04-03 | Stop reason: HOSPADM

## 2017-04-03 RX ORDER — NITROGLYCERIN 5 MG/ML
100-200 VIAL (ML) INTRAVENOUS
Status: DISCONTINUED | OUTPATIENT
Start: 2017-04-03 | End: 2017-04-03 | Stop reason: HOSPADM

## 2017-04-03 RX ORDER — CLOPIDOGREL BISULFATE 75 MG/1
75 TABLET ORAL
Status: DISCONTINUED | OUTPATIENT
Start: 2017-04-03 | End: 2017-04-03 | Stop reason: HOSPADM

## 2017-04-03 RX ORDER — ADENOSINE 3 MG/ML
12-12000 INJECTION, SOLUTION INTRAVENOUS
Status: DISCONTINUED | OUTPATIENT
Start: 2017-04-03 | End: 2017-04-03 | Stop reason: HOSPADM

## 2017-04-03 RX ORDER — METHYLPREDNISOLONE SODIUM SUCCINATE 125 MG/2ML
125 INJECTION, POWDER, LYOPHILIZED, FOR SOLUTION INTRAMUSCULAR; INTRAVENOUS
Status: DISCONTINUED | OUTPATIENT
Start: 2017-04-03 | End: 2017-04-03 | Stop reason: HOSPADM

## 2017-04-03 RX ORDER — NALOXONE HYDROCHLORIDE 0.4 MG/ML
.1-.4 INJECTION, SOLUTION INTRAMUSCULAR; INTRAVENOUS; SUBCUTANEOUS
Status: DISCONTINUED | OUTPATIENT
Start: 2017-04-03 | End: 2017-04-06 | Stop reason: HOSPADM

## 2017-04-03 RX ORDER — PROTAMINE SULFATE 10 MG/ML
25-100 INJECTION, SOLUTION INTRAVENOUS EVERY 5 MIN PRN
Status: DISCONTINUED | OUTPATIENT
Start: 2017-04-03 | End: 2017-04-03 | Stop reason: HOSPADM

## 2017-04-03 RX ORDER — NITROGLYCERIN 0.4 MG/1
0.4 TABLET SUBLINGUAL EVERY 5 MIN PRN
Status: DISCONTINUED | OUTPATIENT
Start: 2017-04-03 | End: 2017-04-06 | Stop reason: HOSPADM

## 2017-04-03 RX ORDER — PHENYLEPHRINE HCL IN 0.9% NACL 1 MG/10 ML
20-100 SYRINGE (ML) INTRAVENOUS
Status: DISCONTINUED | OUTPATIENT
Start: 2017-04-03 | End: 2017-04-03 | Stop reason: HOSPADM

## 2017-04-03 RX ORDER — NALOXONE HYDROCHLORIDE 0.4 MG/ML
.2-.4 INJECTION, SOLUTION INTRAMUSCULAR; INTRAVENOUS; SUBCUTANEOUS
Status: ACTIVE | OUTPATIENT
Start: 2017-04-03 | End: 2017-04-04

## 2017-04-03 RX ORDER — POTASSIUM CHLORIDE 1500 MG/1
20 TABLET, EXTENDED RELEASE ORAL
Status: COMPLETED | OUTPATIENT
Start: 2017-04-03 | End: 2017-04-03

## 2017-04-03 RX ORDER — ENALAPRILAT 1.25 MG/ML
1.25-2.5 INJECTION INTRAVENOUS
Status: DISCONTINUED | OUTPATIENT
Start: 2017-04-03 | End: 2017-04-03 | Stop reason: HOSPADM

## 2017-04-03 RX ORDER — NICARDIPINE HYDROCHLORIDE 2.5 MG/ML
100 INJECTION INTRAVENOUS
Status: DISCONTINUED | OUTPATIENT
Start: 2017-04-03 | End: 2017-04-03 | Stop reason: HOSPADM

## 2017-04-03 RX ORDER — ONDANSETRON 2 MG/ML
4 INJECTION INTRAMUSCULAR; INTRAVENOUS EVERY 4 HOURS PRN
Status: DISCONTINUED | OUTPATIENT
Start: 2017-04-03 | End: 2017-04-03 | Stop reason: HOSPADM

## 2017-04-03 RX ORDER — HEPARIN SODIUM 1000 [USP'U]/ML
INJECTION, SOLUTION INTRAVENOUS; SUBCUTANEOUS
Status: COMPLETED
Start: 2017-04-03 | End: 2017-04-03

## 2017-04-03 RX ORDER — PRASUGREL 10 MG/1
10-60 TABLET, FILM COATED ORAL
Status: DISCONTINUED | OUTPATIENT
Start: 2017-04-03 | End: 2017-04-03 | Stop reason: HOSPADM

## 2017-04-03 RX ORDER — NITROGLYCERIN 20 MG/100ML
.07-2 INJECTION INTRAVENOUS CONTINUOUS PRN
Status: DISCONTINUED | OUTPATIENT
Start: 2017-04-03 | End: 2017-04-03 | Stop reason: HOSPADM

## 2017-04-03 RX ORDER — SODIUM NITROPRUSSIDE 25 MG/ML
100-200 INJECTION INTRAVENOUS
Status: DISCONTINUED | OUTPATIENT
Start: 2017-04-03 | End: 2017-04-03 | Stop reason: HOSPADM

## 2017-04-03 RX ORDER — LIDOCAINE HYDROCHLORIDE 10 MG/ML
30 INJECTION, SOLUTION EPIDURAL; INFILTRATION; INTRACAUDAL; PERINEURAL
Status: DISCONTINUED | OUTPATIENT
Start: 2017-04-03 | End: 2017-04-03 | Stop reason: HOSPADM

## 2017-04-03 RX ORDER — NIFEDIPINE 10 MG/1
10 CAPSULE ORAL
Status: DISCONTINUED | OUTPATIENT
Start: 2017-04-03 | End: 2017-04-03 | Stop reason: HOSPADM

## 2017-04-03 RX ORDER — ASPIRIN 81 MG/1
81 TABLET ORAL DAILY
Status: DISCONTINUED | OUTPATIENT
Start: 2017-04-03 | End: 2017-04-06 | Stop reason: HOSPADM

## 2017-04-03 RX ORDER — NITROGLYCERIN 5 MG/ML
100-500 VIAL (ML) INTRAVENOUS
Status: DISCONTINUED | OUTPATIENT
Start: 2017-04-03 | End: 2017-04-03 | Stop reason: HOSPADM

## 2017-04-03 RX ORDER — LISINOPRIL 20 MG/1
20 TABLET ORAL DAILY
Status: DISCONTINUED | OUTPATIENT
Start: 2017-04-04 | End: 2017-04-06 | Stop reason: HOSPADM

## 2017-04-03 RX ORDER — NITROGLYCERIN 0.4 MG/1
0.4 TABLET SUBLINGUAL EVERY 5 MIN PRN
Status: DISCONTINUED | OUTPATIENT
Start: 2017-04-03 | End: 2017-04-03 | Stop reason: HOSPADM

## 2017-04-03 RX ORDER — MORPHINE SULFATE 2 MG/ML
1-2 INJECTION, SOLUTION INTRAMUSCULAR; INTRAVENOUS EVERY 5 MIN PRN
Status: DISCONTINUED | OUTPATIENT
Start: 2017-04-03 | End: 2017-04-03 | Stop reason: HOSPADM

## 2017-04-03 RX ORDER — EPTIFIBATIDE 2 MG/ML
2 INJECTION, SOLUTION INTRAVENOUS CONTINUOUS PRN
Status: DISCONTINUED | OUTPATIENT
Start: 2017-04-03 | End: 2017-04-03 | Stop reason: HOSPADM

## 2017-04-03 RX ORDER — ASPIRIN 325 MG
325 TABLET ORAL
Status: DISCONTINUED | OUTPATIENT
Start: 2017-04-03 | End: 2017-04-03 | Stop reason: HOSPADM

## 2017-04-03 RX ORDER — LIDOCAINE 40 MG/G
CREAM TOPICAL
Status: DISCONTINUED | OUTPATIENT
Start: 2017-04-03 | End: 2017-04-03 | Stop reason: HOSPADM

## 2017-04-03 RX ORDER — LIDOCAINE 40 MG/G
CREAM TOPICAL
Status: DISCONTINUED | OUTPATIENT
Start: 2017-04-03 | End: 2017-04-06 | Stop reason: HOSPADM

## 2017-04-03 RX ORDER — ACETAMINOPHEN 325 MG/1
325-650 TABLET ORAL EVERY 4 HOURS PRN
Status: DISCONTINUED | OUTPATIENT
Start: 2017-04-03 | End: 2017-04-06 | Stop reason: HOSPADM

## 2017-04-03 RX ORDER — VERAPAMIL HYDROCHLORIDE 2.5 MG/ML
1-2.5 INJECTION, SOLUTION INTRAVENOUS
Status: COMPLETED | OUTPATIENT
Start: 2017-04-03 | End: 2017-04-03

## 2017-04-03 RX ORDER — LORAZEPAM 2 MG/ML
.5-2 INJECTION INTRAMUSCULAR EVERY 4 HOURS PRN
Status: DISCONTINUED | OUTPATIENT
Start: 2017-04-03 | End: 2017-04-03 | Stop reason: HOSPADM

## 2017-04-03 RX ORDER — FUROSEMIDE 10 MG/ML
20-100 INJECTION INTRAMUSCULAR; INTRAVENOUS
Status: DISCONTINUED | OUTPATIENT
Start: 2017-04-03 | End: 2017-04-03 | Stop reason: HOSPADM

## 2017-04-03 RX ORDER — LISINOPRIL 5 MG/1
5 TABLET ORAL DAILY
Status: DISCONTINUED | OUTPATIENT
Start: 2017-04-03 | End: 2017-04-03

## 2017-04-03 RX ORDER — EPTIFIBATIDE 2 MG/ML
180 INJECTION, SOLUTION INTRAVENOUS EVERY 10 MIN PRN
Status: DISCONTINUED | OUTPATIENT
Start: 2017-04-03 | End: 2017-04-03 | Stop reason: HOSPADM

## 2017-04-03 RX ORDER — NITROGLYCERIN 5 MG/ML
VIAL (ML) INTRAVENOUS
Status: DISCONTINUED
Start: 2017-04-03 | End: 2017-04-03 | Stop reason: HOSPADM

## 2017-04-03 RX ORDER — HEPARIN SODIUM 1000 [USP'U]/ML
1000-10000 INJECTION, SOLUTION INTRAVENOUS; SUBCUTANEOUS EVERY 5 MIN PRN
Status: DISCONTINUED | OUTPATIENT
Start: 2017-04-03 | End: 2017-04-03 | Stop reason: HOSPADM

## 2017-04-03 RX ORDER — DOBUTAMINE HYDROCHLORIDE 200 MG/100ML
2-20 INJECTION INTRAVENOUS CONTINUOUS PRN
Status: DISCONTINUED | OUTPATIENT
Start: 2017-04-03 | End: 2017-04-03 | Stop reason: HOSPADM

## 2017-04-03 RX ORDER — HYDRALAZINE HYDROCHLORIDE 20 MG/ML
10-20 INJECTION INTRAMUSCULAR; INTRAVENOUS
Status: DISCONTINUED | OUTPATIENT
Start: 2017-04-03 | End: 2017-04-03 | Stop reason: HOSPADM

## 2017-04-03 RX ORDER — IOPAMIDOL 755 MG/ML
100 INJECTION, SOLUTION INTRAVASCULAR ONCE
Status: COMPLETED | OUTPATIENT
Start: 2017-04-03 | End: 2017-04-03

## 2017-04-03 RX ORDER — PROMETHAZINE HYDROCHLORIDE 25 MG/ML
6.25-25 INJECTION, SOLUTION INTRAMUSCULAR; INTRAVENOUS EVERY 4 HOURS PRN
Status: DISCONTINUED | OUTPATIENT
Start: 2017-04-03 | End: 2017-04-03 | Stop reason: HOSPADM

## 2017-04-03 RX ORDER — FENTANYL CITRATE 50 UG/ML
INJECTION, SOLUTION INTRAMUSCULAR; INTRAVENOUS
Status: DISCONTINUED
Start: 2017-04-03 | End: 2017-04-03 | Stop reason: HOSPADM

## 2017-04-03 RX ORDER — ASPIRIN 81 MG/1
81-324 TABLET, CHEWABLE ORAL
Status: DISCONTINUED | OUTPATIENT
Start: 2017-04-03 | End: 2017-04-03 | Stop reason: HOSPADM

## 2017-04-03 RX ORDER — POTASSIUM CHLORIDE 7.45 MG/ML
10 INJECTION INTRAVENOUS
Status: DISCONTINUED | OUTPATIENT
Start: 2017-04-03 | End: 2017-04-03 | Stop reason: HOSPADM

## 2017-04-03 RX ORDER — SODIUM CHLORIDE 9 MG/ML
INJECTION, SOLUTION INTRAVENOUS CONTINUOUS
Status: ACTIVE | OUTPATIENT
Start: 2017-04-03 | End: 2017-04-03

## 2017-04-03 RX ORDER — DEXTROSE MONOHYDRATE 25 G/50ML
12.5-5 INJECTION, SOLUTION INTRAVENOUS EVERY 30 MIN PRN
Status: DISCONTINUED | OUTPATIENT
Start: 2017-04-03 | End: 2017-04-03 | Stop reason: HOSPADM

## 2017-04-03 RX ORDER — BUPIVACAINE HYDROCHLORIDE 2.5 MG/ML
1-10 INJECTION, SOLUTION EPIDURAL; INFILTRATION; INTRACAUDAL
Status: DISCONTINUED | OUTPATIENT
Start: 2017-04-03 | End: 2017-04-03 | Stop reason: HOSPADM

## 2017-04-03 RX ORDER — FENTANYL CITRATE 50 UG/ML
25-50 INJECTION, SOLUTION INTRAMUSCULAR; INTRAVENOUS
Status: DISPENSED | OUTPATIENT
Start: 2017-04-03 | End: 2017-04-04

## 2017-04-03 RX ORDER — POTASSIUM CHLORIDE 29.8 MG/ML
20 INJECTION INTRAVENOUS
Status: DISCONTINUED | OUTPATIENT
Start: 2017-04-03 | End: 2017-04-03 | Stop reason: HOSPADM

## 2017-04-03 RX ORDER — ADENOSINE 3 MG/ML
INJECTION, SOLUTION INTRAVENOUS
Status: DISCONTINUED
Start: 2017-04-03 | End: 2017-04-03 | Stop reason: HOSPADM

## 2017-04-03 RX ORDER — HYDROCODONE BITARTRATE AND ACETAMINOPHEN 5; 325 MG/1; MG/1
1-2 TABLET ORAL EVERY 4 HOURS PRN
Status: DISCONTINUED | OUTPATIENT
Start: 2017-04-03 | End: 2017-04-06 | Stop reason: HOSPADM

## 2017-04-03 RX ORDER — LISINOPRIL 10 MG/1
10 TABLET ORAL DAILY
Status: DISCONTINUED | OUTPATIENT
Start: 2017-04-04 | End: 2017-04-03

## 2017-04-03 RX ORDER — NALOXONE HYDROCHLORIDE 0.4 MG/ML
0.4 INJECTION, SOLUTION INTRAMUSCULAR; INTRAVENOUS; SUBCUTANEOUS EVERY 5 MIN PRN
Status: DISCONTINUED | OUTPATIENT
Start: 2017-04-03 | End: 2017-04-03 | Stop reason: HOSPADM

## 2017-04-03 RX ORDER — SODIUM CHLORIDE 9 MG/ML
INJECTION, SOLUTION INTRAVENOUS CONTINUOUS
Status: DISCONTINUED | OUTPATIENT
Start: 2017-04-03 | End: 2017-04-03 | Stop reason: HOSPADM

## 2017-04-03 RX ORDER — LIDOCAINE HYDROCHLORIDE 10 MG/ML
1-10 INJECTION, SOLUTION EPIDURAL; INFILTRATION; INTRACAUDAL; PERINEURAL
Status: DISCONTINUED | OUTPATIENT
Start: 2017-04-03 | End: 2017-04-03 | Stop reason: HOSPADM

## 2017-04-03 RX ORDER — CLOPIDOGREL BISULFATE 75 MG/1
300-600 TABLET ORAL
Status: DISCONTINUED | OUTPATIENT
Start: 2017-04-03 | End: 2017-04-03 | Stop reason: HOSPADM

## 2017-04-03 RX ORDER — FENTANYL CITRATE 50 UG/ML
INJECTION, SOLUTION INTRAMUSCULAR; INTRAVENOUS
Status: COMPLETED
Start: 2017-04-03 | End: 2017-04-03

## 2017-04-03 RX ORDER — DIPHENHYDRAMINE HYDROCHLORIDE 50 MG/ML
25-50 INJECTION INTRAMUSCULAR; INTRAVENOUS
Status: DISCONTINUED | OUTPATIENT
Start: 2017-04-03 | End: 2017-04-03 | Stop reason: HOSPADM

## 2017-04-03 RX ORDER — FENTANYL CITRATE 50 UG/ML
25-50 INJECTION, SOLUTION INTRAMUSCULAR; INTRAVENOUS
Status: DISCONTINUED | OUTPATIENT
Start: 2017-04-03 | End: 2017-04-03 | Stop reason: HOSPADM

## 2017-04-03 RX ORDER — FLUMAZENIL 0.1 MG/ML
0.2 INJECTION, SOLUTION INTRAVENOUS
Status: ACTIVE | OUTPATIENT
Start: 2017-04-03 | End: 2017-04-04

## 2017-04-03 RX ORDER — LORAZEPAM 2 MG/ML
0.5 INJECTION INTRAMUSCULAR
Status: DISCONTINUED | OUTPATIENT
Start: 2017-04-03 | End: 2017-04-03 | Stop reason: HOSPADM

## 2017-04-03 RX ORDER — LORAZEPAM 0.5 MG/1
0.5 TABLET ORAL
Status: DISCONTINUED | OUTPATIENT
Start: 2017-04-03 | End: 2017-04-03 | Stop reason: HOSPADM

## 2017-04-03 RX ORDER — PROTAMINE SULFATE 10 MG/ML
1-5 INJECTION, SOLUTION INTRAVENOUS
Status: DISCONTINUED | OUTPATIENT
Start: 2017-04-03 | End: 2017-04-03 | Stop reason: HOSPADM

## 2017-04-03 RX ADMIN — TICAGRELOR 90 MG: 90 TABLET ORAL at 23:28

## 2017-04-03 RX ADMIN — SODIUM CHLORIDE: 9 INJECTION, SOLUTION INTRAVENOUS at 12:45

## 2017-04-03 RX ADMIN — IOPAMIDOL 210 ML: 755 INJECTION, SOLUTION INTRAVASCULAR at 13:30

## 2017-04-03 RX ADMIN — TICAGRELOR 180 MG: 90 TABLET ORAL at 14:09

## 2017-04-03 RX ADMIN — ASPIRIN 325 MG: 325 TABLET, DELAYED RELEASE ORAL at 08:25

## 2017-04-03 RX ADMIN — HYDROCODONE BITARTRATE AND ACETAMINOPHEN 2 TABLET: 5; 325 TABLET ORAL at 23:30

## 2017-04-03 RX ADMIN — FENTANYL CITRATE 25 MCG: 50 INJECTION INTRAMUSCULAR; INTRAVENOUS at 22:57

## 2017-04-03 RX ADMIN — HEPARIN SODIUM 4000 UNITS: 1000 INJECTION, SOLUTION INTRAVENOUS; SUBCUTANEOUS at 14:12

## 2017-04-03 RX ADMIN — HEPARIN SODIUM 5000 UNITS: 1000 INJECTION, SOLUTION INTRAVENOUS; SUBCUTANEOUS at 13:58

## 2017-04-03 RX ADMIN — VERAPAMIL HYDROCHLORIDE 2.5 MG: 2.5 INJECTION, SOLUTION INTRAVENOUS at 15:02

## 2017-04-03 RX ADMIN — FENTANYL CITRATE 150 MCG: 50 INJECTION INTRAMUSCULAR; INTRAVENOUS at 15:02

## 2017-04-03 RX ADMIN — ACETAMINOPHEN 650 MG: 325 TABLET, FILM COATED ORAL at 17:52

## 2017-04-03 RX ADMIN — METOPROLOL TARTRATE 25 MG: 25 TABLET ORAL at 08:25

## 2017-04-03 RX ADMIN — ATORVASTATIN CALCIUM 40 MG: 40 TABLET, FILM COATED ORAL at 08:25

## 2017-04-03 RX ADMIN — ADENOSINE 140 MCG/KG/MIN: 3 SOLUTION INTRAVENOUS at 14:30

## 2017-04-03 RX ADMIN — HEPARIN SODIUM 3000 UNITS: 1000 INJECTION, SOLUTION INTRAVENOUS; SUBCUTANEOUS at 14:29

## 2017-04-03 RX ADMIN — FENTANYL CITRATE 50 MCG: 50 INJECTION INTRAMUSCULAR; INTRAVENOUS at 22:41

## 2017-04-03 RX ADMIN — LISINOPRIL 5 MG: 5 TABLET ORAL at 10:20

## 2017-04-03 RX ADMIN — POTASSIUM CHLORIDE 20 MEQ: 1500 TABLET, EXTENDED RELEASE ORAL at 10:20

## 2017-04-03 RX ADMIN — ACETAMINOPHEN 650 MG: 325 TABLET, FILM COATED ORAL at 08:25

## 2017-04-03 RX ADMIN — MIDAZOLAM HYDROCHLORIDE 3 MG: 1 INJECTION, SOLUTION INTRAMUSCULAR; INTRAVENOUS at 15:02

## 2017-04-03 RX ADMIN — MIDAZOLAM 1 MG: 1 INJECTION INTRAMUSCULAR; INTRAVENOUS at 22:43

## 2017-04-03 RX ADMIN — SODIUM CHLORIDE: 9 INJECTION, SOLUTION INTRAVENOUS at 08:09

## 2017-04-03 RX ADMIN — AMLODIPINE BESYLATE 5 MG: 5 TABLET ORAL at 08:25

## 2017-04-03 RX ADMIN — HEPARIN SODIUM 750 UNITS/HR: 10000 INJECTION, SOLUTION INTRAVENOUS at 04:30

## 2017-04-03 RX ADMIN — METOPROLOL TARTRATE 25 MG: 25 TABLET ORAL at 23:30

## 2017-04-03 ASSESSMENT — PAIN DESCRIPTION - DESCRIPTORS: DESCRIPTORS: ACHING

## 2017-04-03 NOTE — CONSULTS
I have examined the patient, reviewed the history, medications and pre procedural tests. He has sustained a NSTEMI. I have explained to the patient the risks of death, MI, stroke, hematoma, possible urgent bypass surgery for failed PCI, use of stents, thienopyridine agents, possible peripheral vascular complications, arrhythmia, the use of FFR in clinical decision-making and alternative of medical therapy alone in regards to left heart catheterization, left ventriculography, coronary angiography, and possible percutaneous coronary intervention. The patient voiced understanding and wishes to proceed. The patient has a good right radial pulse, normal ulnar pulse and a normal Krishan's sign.

## 2017-04-03 NOTE — PROGRESS NOTES
"Cardiology Progress Note  Janette Vo, APRN          Assessment and Plan:   Admit (4/2) with prolonged episode of CP w/ assoc emesis.  Evidence of NSTEMI.  Recent SBO w/ lysis of adhesions  PMH:  HTN/hyperlipidemia    NSTEMI:  -troponin peak 13/ST abnormalities precordial leads/ECHO pending  -No further CP overnight w/ rising troponins  -multiple CVRF:  HTN/hyperlipidemia  -Plan for coronary angiogram today  -bblocker/ASA/ACe-I/statin/Heparin    HTN:  -elevated on admit--> lisinopril added  -BP borderline this am--> will increase Lisinopril to 5mg    Hyperlipidemia:  -on statin--> LDL 68               Interval History:   No CP/SOB/palpitations  +HA  Wife at bedside                Medications:       sodium chloride (PF)  3 mL Intracatheter Q8H     aspirin EC  325 mg Oral Daily     [START ON 4/4/2017] pneumococcal vaccine  0.5 mL Intramuscular Prior to discharge     metoprolol (LOPRESSOR) tablet 25 mg  25 mg Oral BID     amLODIPine (NORVASC) tablet 5 mg  5 mg Oral Daily     atorvastatin  40 mg Oral Daily     lisinopril  2.5 mg Oral Daily     aspirin EC  81 mg Oral Daily            Physical Exam:   Blood pressure 149/89, pulse 68, temperature 96.6  F (35.9  C), temperature source Oral, resp. rate 16, height 1.753 m (5' 9\"), weight 84.7 kg (186 lb 11.2 oz), SpO2 93 %.  Wt Readings from Last 3 Encounters:   04/02/17 84.7 kg (186 lb 11.2 oz)   03/26/17 83.6 kg (184 lb 4.8 oz)   09/18/15 85 kg (187 lb 8 oz)     I/O last 3 completed shifts:  In: 975 [P.O.:300; I.V.:675]  Out: 2650 [Urine:2650]    CONST:  Alert and oriented  NAD  LUNGS:  CTA bilat  CARDIO:  RRR, S1, S2  ABD:  Soft  +BS  EXT:  No edema  2+DP bilat           Data:   TELE:  SR with first degree AV block    CBC    Recent Labs  Lab 04/02/17  0804 04/02/17  0254   WBC 6.2 8.0   HGB 15.3 16.3   * 211       BMP    Recent Labs  Lab 04/03/17  0548 04/02/17  0254   NA  --  140   POTASSIUM 3.9 3.9   CHLORIDE  --  105   JOANN  --  8.5   CO2  --  25   BUN  --  17   CR "  --  0.80   GLC  --  126*     Recent Labs   Lab Test  04/02/17   0804  02/11/15   0737   CHOL  129   --    HDL  44   --    LDL  68   --    TRIG  83  77       TROP  Lab Results   Component Value Date    TROPI 13.332 () 04/02/2017    TROPI 10.457 () 04/02/2017    TROPI 2.200 () 04/02/2017    TROPI 0.173 () 04/02/2017    TROPI  02/10/2015     <0.015  The 99th percentile for upper reference range is 0.045 ug/L.  Troponin values in   the range of 0.045 - 0.120 ug/L may be associated with risks of adverse   clinical events.   Effective 7/30/2014, the reference range for this assay has changed to reflect   new instrumentation/methodology.         BNP  No results for input(s): NTBNPI, NTBNP in the last 168 hours.

## 2017-04-03 NOTE — PLAN OF CARE
Problem: Cardiac: Acute Coronary Syndrome (ACS) (Adult)  Goal: Signs and Symptoms of Listed Potential Problems Will be Absent or Manageable (Cardiac: Acute Coronary Syndrome)  Signs and symptoms of listed potential problems will be absent or manageable by discharge/transition of care (reference Cardiac: Acute Coronary Syndrome (ACS) (Adult) CPG).   Outcome: Improving  No cp entire shift. Trop at 1900 trending up 10.3 Admitting pager paged x2 no new orders no call back. Pt continues to deny cp hep 10 low at 1900 pharm increased heparin to 900u/hr with 3800 boulus. Recheck hep 10 a ordered for 0300. vss checked at this time wnl. A&Ox4 up with sba. Angio in am. Tele sr with first degree AVB     Call back received from Dr. Jon. In regards to elevated trop 13. Per MD continue hep gtt and call md if pt has persistant pain. No need to order recheck of trop per md.

## 2017-04-03 NOTE — PROGRESS NOTES
[facility]  Hospitalist Progress Note    Name: Pablo Fuentes    MRN: [id number]  Provider:  London Perez MD, [facility]    Date of Service: 04/03/2017     Reason for Stay (Diagnosis): Myocardial infarction         Summary of hospital stay & Assessment/Plan:   Summary of Stay: Pablo Fuentes is a 76 year old male who was admitted on 4/2/2017  76-year-old male with a history of hypertension, recent admission to the hospital one week ago for a small bowel obstruction for which he underwent lysis of adhesions, who presents to the hospital this evening for concerns about chest pain. Onset of chest pain was approximately 10 hours PTP    Problem List:   1. Acute myocardial infarction  2. Hypertension needs better control  3. Recent hiatal hernia repair and laparoscopic surgery with adhesion lysis for bowel obstruction, no abdominal symptoms    Continue heparin drip, acute coronary syndrome protocol  Increase lisinopril to 20 mg daily and consider increasing it, with metoprolol if needed amlodipine can be discontinued  Coronary angiogram today at 1 PM for diagnostic and likely intervention  Follow-up with cardiology      DVT Prophylaxis: Heparin   Code Status:  Full Code  Discharge Dispo: home  Estimated # of Days until Disch: 1-2          Interval History:       He denies any chest pain or shortness of breath  Case discussed with pt nurse                Physical Exam:   Physical Exam   Temp: 96.6  F (35.9  C) Temp src: Oral BP: (!) 150/99   Heart Rate: 73 Resp: 16 SpO2: 93 % O2 Device: None (Room air)    Vitals:    04/02/17 0403 04/02/17 0529   Weight: 83.5 kg (184 lb) 84.7 kg (186 lb 11.2 oz)     I/O last 3 completed shifts:  In: 975 [P.O.:300; I.V.:675]  Out: 2650 [Urine:2650]        GENERAL:  Comfortable.   PSYCH: pleasant, oriented, No acute distress.  EYES: PERRLA, Normal conjunctiva.  HEART:  Normal S1, S2 with no edema.  LUNGS:  Clear to auscultation, normal Respiratory effort.  ABDOMEN:  Soft, no  hepatosplenomegaly, normal bowel sounds.  SKIN:  Dry to touch, No rash.      Medications     NaCl       HEParin 750 Units/hr (04/03/17 0817)     adenosine (ADENOSCAN) 90mg in sodium chloride 0.9% 90 mL - for Cath LAB (FFR)       bivalirudin (ANGIOMAX) infusion ADULT       DOBUTamine       DOPamine       eptifibatide       nitroglycerin       nitroprusside (NIPRIDE) infusion ADULT/PEDS GREATER than or EQUAL to 45 kg std conc       phenylephrine IV infusion ADULT       - MEDICATION INSTRUCTIONS -         sodium chloride (PF)  3 mL Intracatheter Q8H     aspirin EC  325 mg Oral Daily     [START ON 4/4/2017] pneumococcal vaccine  0.5 mL Intramuscular Prior to discharge     acetylcholine  20 mcg INTRACORONARY Once    Followed by     acetylcholine  50 mcg INTRACORONARY Once    Followed by     acetylcholine  50 mcg INTRACORONARY Once     acetylcholine  20 mcg INTRACORONARY Once    Followed by     acetylcholine  50 mcg INTRACORONARY Once     [START ON 4/4/2017] lisinopril  10 mg Oral Daily     metoprolol (LOPRESSOR) tablet 25 mg  25 mg Oral BID     amLODIPine (NORVASC) tablet 5 mg  5 mg Oral Daily     atorvastatin  40 mg Oral Daily     aspirin EC  81 mg Oral Daily     Data     -Data reviewed today:  I personally reviewed  all new labs and imaging results over the last 24 hours.      Recent Labs  Lab 04/02/17  0804 04/02/17  0254   WBC 6.2 8.0   HGB 15.3 16.3   HCT 42.5 46.9   MCV 96 97   * 211       Recent Labs  Lab 04/03/17  0548 04/02/17  0254   NA  --  140   POTASSIUM 3.9 3.9   CHLORIDE  --  105   CO2  --  25   ANIONGAP  --  10   GLC  --  126*   BUN  --  17   CR  --  0.80   GFRESTIMATED  --  >90Non  GFR Calc   GFRESTBLACK  --  >90African American GFR Calc   JOANN  --  8.5       No results found for this or any previous visit (from the past 24 hour(s)).    This document was produced using voice recognition software

## 2017-04-03 NOTE — PLAN OF CARE
Problem: Goal Outcome Summary  Goal: Goal Outcome Summary  VSS, afebrile, denied chest pain. Tele SA. Heparin gtt decreased to 750 units/hr. IVF @ 75/hr. ECHO and angio scheduled for today. Transfers SBA with walker. Spouse slept bedside overnight.

## 2017-04-03 NOTE — PROCEDURES
Procedure  1) CAG  2) PCI CX  2,5 x 24 mm everolimus eluting PROMUS PREMIERE  3) FFR proximal RCA  = 0.93    Approach LTR for diagnostic. RFA for PCI. The patient's vessels were very tortuous which made case very challenging. We access RTR, but could not pass a wire cephalad, due to tortuosity. We were able to access LTR and perform diagnostic CAG via LTR, but we were unable to use 6 Fr guides from LTR due to tortuosity and aortic enlargement. We used RFA for PCI.    Findings  LMCA ostial 30%. No catheter dampening, good reflux  LAD very small 2.25 LAD 40 to 50%. Small D2 ( no more than 2mm) 70% lesion.   CX nondominant. Subtotal mid. Appears to be infarct-related vessel, correlates with ECG and TTE findings.  RCA very large. Calcified 60 to 70% proximal lesion. FFR = 0.93.    We stented CX with 2.5 x 24 mm everolimus eluting PROMUS PREMIERE stent with excellent angiographic results.  FFR in RCA 0.93 which indicates no benefit from PCI. The LAD system is small, but if significant ischemia seen, could place 2.25 stent in LAD.     Reccomendations  1) asa/ ticagrelor/ Medical treatment. Would likely manage LAD medically unless symptoms or significant ischemia    [place]

## 2017-04-03 NOTE — PLAN OF CARE
Problem: Goal Outcome Summary  Goal: Goal Outcome Summary  Outcome: No Change  ICU End of Shift Summary.  For vital signs and complete assessments, please see documentation flowsheets.      Pertinent assessments: Patient received from cath lab. Patient alert and oriented. Left radial TR band in place. Right groin sheath in place. Patient alert and oriented. Vitals stable. No complaints of pain.  Major Shift Events: Stent placed in cath lab  Plan (Upcoming Events): continue to monitor. Sheath removal when able.  Discharge/Transfer Needs: home     Bedside Shift Report Completed : yes   Bedside Safety Check Completed:yes

## 2017-04-04 ENCOUNTER — APPOINTMENT (OUTPATIENT)
Dept: OCCUPATIONAL THERAPY | Facility: CLINIC | Age: 77
DRG: 247 | End: 2017-04-04
Attending: INTERNAL MEDICINE
Payer: MEDICARE

## 2017-04-04 LAB
ANION GAP SERPL CALCULATED.3IONS-SCNC: 9 MMOL/L (ref 3–14)
BUN SERPL-MCNC: 13 MG/DL (ref 7–30)
CALCIUM SERPL-MCNC: 8.2 MG/DL (ref 8.5–10.1)
CHLORIDE SERPL-SCNC: 108 MMOL/L (ref 94–109)
CO2 SERPL-SCNC: 23 MMOL/L (ref 20–32)
CREAT SERPL-MCNC: 0.9 MG/DL (ref 0.66–1.25)
ERYTHROCYTE [DISTWIDTH] IN BLOOD BY AUTOMATED COUNT: 12.1 % (ref 10–15)
GFR SERPL CREATININE-BSD FRML MDRD: 81 ML/MIN/1.7M2
GLUCOSE SERPL-MCNC: 109 MG/DL (ref 70–99)
HCT VFR BLD AUTO: 40.9 % (ref 40–53)
HGB BLD-MCNC: 14.1 G/DL (ref 13.3–17.7)
LMWH PPP CHRO-ACNC: NORMAL IU/ML
MCH RBC QN AUTO: 33.5 PG (ref 26.5–33)
MCHC RBC AUTO-ENTMCNC: 34.5 G/DL (ref 31.5–36.5)
MCV RBC AUTO: 97 FL (ref 78–100)
PLATELET # BLD AUTO: 162 10E9/L (ref 150–450)
POTASSIUM SERPL-SCNC: 3.9 MMOL/L (ref 3.4–5.3)
RBC # BLD AUTO: 4.21 10E12/L (ref 4.4–5.9)
SODIUM SERPL-SCNC: 140 MMOL/L (ref 133–144)
WBC # BLD AUTO: 8.9 10E9/L (ref 4–11)

## 2017-04-04 PROCEDURE — 12000007 ZZH R&B INTERMEDIATE

## 2017-04-04 PROCEDURE — 99232 SBSQ HOSP IP/OBS MODERATE 35: CPT | Performed by: INTERNAL MEDICINE

## 2017-04-04 PROCEDURE — 80048 BASIC METABOLIC PNL TOTAL CA: CPT | Performed by: INTERNAL MEDICINE

## 2017-04-04 PROCEDURE — 99233 SBSQ HOSP IP/OBS HIGH 50: CPT | Performed by: INTERNAL MEDICINE

## 2017-04-04 PROCEDURE — 85027 COMPLETE CBC AUTOMATED: CPT | Performed by: INTERNAL MEDICINE

## 2017-04-04 PROCEDURE — 97530 THERAPEUTIC ACTIVITIES: CPT | Mod: GO | Performed by: REHABILITATION PRACTITIONER

## 2017-04-04 PROCEDURE — 25000132 ZZH RX MED GY IP 250 OP 250 PS 637: Mod: GY | Performed by: INTERNAL MEDICINE

## 2017-04-04 PROCEDURE — A9270 NON-COVERED ITEM OR SERVICE: HCPCS | Mod: GY | Performed by: INTERNAL MEDICINE

## 2017-04-04 PROCEDURE — 40000133 ZZH STATISTIC OT WARD VISIT: Performed by: REHABILITATION PRACTITIONER

## 2017-04-04 PROCEDURE — 85520 HEPARIN ASSAY: CPT | Performed by: INTERNAL MEDICINE

## 2017-04-04 PROCEDURE — 97165 OT EVAL LOW COMPLEX 30 MIN: CPT | Mod: GO | Performed by: REHABILITATION PRACTITIONER

## 2017-04-04 PROCEDURE — 36415 COLL VENOUS BLD VENIPUNCTURE: CPT | Performed by: INTERNAL MEDICINE

## 2017-04-04 RX ADMIN — LISINOPRIL 20 MG: 20 TABLET ORAL at 09:51

## 2017-04-04 RX ADMIN — METOPROLOL TARTRATE 25 MG: 25 TABLET ORAL at 21:04

## 2017-04-04 RX ADMIN — TICAGRELOR 90 MG: 90 TABLET ORAL at 21:04

## 2017-04-04 RX ADMIN — TICAGRELOR 90 MG: 90 TABLET ORAL at 09:53

## 2017-04-04 RX ADMIN — ASPIRIN 81 MG: 81 TABLET, COATED ORAL at 09:53

## 2017-04-04 RX ADMIN — ATORVASTATIN CALCIUM 40 MG: 40 TABLET, FILM COATED ORAL at 09:52

## 2017-04-04 RX ADMIN — HYDROCODONE BITARTRATE AND ACETAMINOPHEN 2 TABLET: 5; 325 TABLET ORAL at 03:32

## 2017-04-04 RX ADMIN — METOPROLOL TARTRATE 25 MG: 25 TABLET ORAL at 09:54

## 2017-04-04 NOTE — CONSULTS
"NUTRITION ASSESSMENT & EDUCATION NOTE      REASON FOR ASSESSMENT  Pablo Fuentes is a 76 year old male seen by Registered Dietitian for Provider Order - Nutrition Education - \"[facility] diet education\"     Nutrition History  Nutrition History:     - Information obtained from patient and wife   - Patient is on a regular diet at home  - Typical food/fluid intake: Patient with recent SBO (~1 week ago); however notes that he has resumed his typical intake.   - Patient usually has one meal per day consisting of \"whatever he wants.\" Typical foods include [employer] meatloaf, buffalo chili and rotisserie chicken. Additionally, wife makes patient a smoothie/shake every AM that consists of fruit and spinach with ~1/2 cup of coconut milk  - Utilize olive oil when cooking. Wife notes that she uses \"accent\" (60% less salt seasoning)  - No prior diet education     CURRENT DIET ORDER  Diet:  Low Saturated Fat/2400 mg Sodium    Intake/Tolerance:  Patient reports that appetite has been \"fair\" since angiogram. Patient able to eat ~50% of breakfast and lunch.     Anthropometrics  Height: 5'9\"  Weight: 83.4 kg    BMI Calculated:  27.15 kgm^2  IBW:  72.73 kg   Weight Status:  Overweight BMI 25-29.9  % IBW:  115%  Weight History: The data below suggests that patient's weight has been stable over the past seven months.   Wt Readings from Last 10 Encounters:   04/04/17 83.4 kg (183 lb 13.8 oz)   03/26/17 83.6 kg (184 lb 4.8 oz)   09/18/15 85 kg (187 lb 8 oz)   06/12/15 83.8 kg (184 lb 11.2 oz)   05/05/15 81.1 kg (178 lb 12.8 oz)   04/03/15 79.2 kg (174 lb 11.2 oz)   03/11/15 81.6 kg (179 lb 12.8 oz)   02/26/15 77.7 kg (171 lb 4.8 oz)   02/10/15 83.9 kg (185 lb)   ]   Dosing Weight: 83.4 kg (admit weight)    LABS  [facility]     MEDICATIONS  Reviewed     ASSESSED NUTRITION NEEDS:  Estimated Energy Needs: 2501-9487 kcals/day (25-30 Kcal/Kg  Justification:  maintenance      Estimated Protein Needs:  grams protein/day (1-1.2 g " pro/Kg  Justification:  maintenance         Estimated Fluid Needs: 8566-9078  mL/day (1 mL/Kcal   Justification:  maintenance      MALNUTRITION:  Patient does not meet two of the following criteria necessary for diagnosing malnutrition: significant weight loss, reduced intake, subcutaneous fat loss, muscle loss or fluid retention    NUTRITION DIAGNOSIS    Food- and nutrition- related knowledge deficit R/t no prior heart healthy diet education as evidenced by patient report.       INTERVENTIONS    Nutrition Prescription:  Low saturated fat / </=2300 mg sodium diet      Implementation    Nutrition Education (Content):  a) Provided handouts - Heart Healthy Guidelines, Tips for Low Sodium Diet, and Reading Nutrition Labels  b) Discussed rational for diet recommendations   c) Encouraged intake of whole grains, variety of fruits and vegetables, lean proteins, and low-fat dairy  d) Discussed types of fat and respective sources. Also encouraged patient to limit intake of saturated and Trans fat  e) Discussed high sodium foods and tips to reduce sodium intake  f) Discussed components of nutrition label and discussed how to read the label. Encouraged patient to monitor portion sizes  g) Discussed ways to eat healthy for your heart when dining out; however encouraged patient to limit frequency of dining out.     Nutrition Education (Application):  a) Discussed eating habits and recommended alternative food choices    Patient verbalizes understanding of diet     Anticipate good compliance    Diet Education - refer to Education Flowsheet    Provided RD contact information and encouraged patient to consult RD with any questions/concerns    Goals    Patient verbalizes understanding of diet by by stating heart healthy diet recommendations     Follow up/Monitoring     Will re-evaluate in 7 days, on sooner, if nutrition status changes    Kalpana Canales RD, LD  Clinical Dietitian  3rd Floor/ICU Pager: [phone]  All Other Floors  Pager: [phone]  Weekend/Holiday Pager: [phone]

## 2017-04-04 NOTE — PROGRESS NOTES
[facility]  Hospitalist Progress Note    Name: Pablo Fuentes    MRN: [id number]  Provider:  Janusz Soares MD.    Date of Service: 04/04/2017     Reason for Stay (Diagnosis): NSTEMI.         Summary of hospital stay & Assessment/Plan:   Summary of Stay: Pablo Fuentes is a 76 year old male who was admitted on 4/2/2017  76-year-old male with a history of hypertension, recent admission to the hospital one week ago for a small bowel obstruction for which he underwent lysis of adhesions, who presents to the hospital this evening for concerns about chest pain. Onset of chest pain was approximately 10 hours PTP, found to have NSTEMI and underwent coronary angiogram on 04/03, KHANG placed to mid CFX.    Problem List:   1. NSTEMI s/p KHANG to mid CFX on 04/03- NO chest pain, GUERITA showed EF 60 % with +RWMA.  - Continue Statin, ASA, Brilinta, BB, ACEI as ordered.  2. Hypertension- Better controlled  - Continue ACE and BB.  3. Recent hiatal hernia repair and laparoscopic surgery with adhesion lysis for bowel obstruction, no abdominal symptoms- stable.  4. Dizziness and generalized weakness- This is new to him, continue to monitor, check Orthostatic vitals, fall precaution. PT/OT consulted, discussed with PT.        DVT Prophylaxis: Heparin   Code Status:  Full Code  Discharge Dispo: home  Estimated # of Days until Disch: 1day if stable.  Patient was very dizzy and weak after PT this morning and not ready for discharge today. If he improves, will go home otherwise will consider TCU.          Interval History:   Patient seen and examined, assumed care today. No chest pain, but felt tired and weak. No N/V. Has dizziness with activity,. His wife at bedside. All their question and concerns addressed  Case also discussed with pt nurse                Physical Exam:   Physical Exam   Temp: 98.9  F (37.2  C) Temp src: Oral BP: 127/77 Pulse: 71 Heart Rate: 98 Resp: 21 SpO2: 92 % O2 Device: None (Room air)    Vitals:     04/02/17 0403 04/02/17 0529 04/04/17 0400   Weight: 83.5 kg (184 lb) 84.7 kg (186 lb 11.2 oz) 83.4 kg (183 lb 13.8 oz)     I/O last 3 completed shifts:  In: 1229.01 [P.O.:120; I.V.:1109.01]  Out: 1000 [Urine:1000]        GENERAL:  Comfortable.   PSYCH: pleasant, oriented, No acute distress.  EYES: PERRLA, Normal conjunctiva.  HEART:  Normal S1, S2 with no edema.  LUNGS:  Clear to auscultation, normal Respiratory effort.  ABDOMEN:  Soft, no hepatosplenomegaly, normal bowel sounds.  SKIN:  Dry to touch, No rash.      Medications     Percutaneous Coronary Intervention orders placed (this is information for BPA alerting)       - MEDICATION INSTRUCTIONS -       Continuing ACE inhibitor/ARB from home medication list OR ACE inhibitor/ARB order already placed during this visit       - MEDICATION INSTRUCTIONS -       - MEDICATION INSTRUCTIONS -         pneumococcal vaccine  0.5 mL Intramuscular Prior to discharge     lisinopril  20 mg Oral Daily     sodium chloride (PF)  3 mL Intracatheter Q8H     aspirin EC  81 mg Oral Daily     ticagrelor  90 mg Oral Q12H     metoprolol (LOPRESSOR) tablet 25 mg  25 mg Oral BID     atorvastatin  40 mg Oral Daily     Data     -Data reviewed today:  I personally reviewed  all new labs and imaging results over the last 24 hours.      Recent Labs  Lab 04/04/17  0522 04/02/17  0804 04/02/17  0254   WBC 8.9 6.2 8.0   HGB 14.1 15.3 16.3   HCT 40.9 42.5 46.9   MCV 97 96 97    142* 211       Recent Labs  Lab 04/04/17  0522 04/03/17  0548 04/02/17  0254     --  140   POTASSIUM 3.9 3.9 3.9   CHLORIDE 108  --  105   CO2 23  --  25   ANIONGAP 9  --  10   *  --  126*   BUN 13  --  17   CR 0.90  --  0.80   GFRESTIMATED 81  --  >90Non  GFR Calc   GFRESTBLACK >90African American GFR Calc  --  >90African American GFR Calc   JOANN 8.2*  --  8.5       No results found for this or any previous visit (from the past 24 hour(s)).

## 2017-04-04 NOTE — PLAN OF CARE
Problem: Goal Outcome Summary  Goal: Goal Outcome Summary  Outcome: Improving  Ambulatory Status:  Pt up with Ax1 with walker. Needs to be reminded not to use his wrists to push off with when standing and lifting restrictions for the next 24 hrs.  VS:  Stable, afebrile  Pain:  denies  Resp: LS clear on room air  Cardiac: has 3 angio sites, the two wrist sites were failed attempts and the right groin was where the angio with stents was completed. Bruising located at all sites and marked on groin area.  GI:  denies nausea.  fair appetite and on cardiac diet.  BS active.  Passing flatus.  Last BM today, history of SBO in March.  :  Denies concerns, has urinal at bedside.  Skin:  Bruising present, IV in right, scars from recent and past surgeries  Tx:  Rehab and medication management. Pt needs to continue to gain strength with cardiac rehab. He became dizzy and weak today which didn't allow him to go home today. Will try again tomorrow.  Consults:  Cardiology, rehab  Disposition:  TBD- plan is to discharge home tomorrow with wife if successful rehab occurs. Pt may need to go to TCU if there is still weakness present. Will continue to monitor.

## 2017-04-04 NOTE — PROGRESS NOTES
This is a patient known to me from recent laparoscopic lysis of adhesions.  He has now presented with a myocardial infarction.  He was due to see me son for a post op check.  Family requested I come by during the hospitalization.  He has no complaints related to his recent surgery.  He has been having normal bowel movements at home and has been tolerating a diet.    Incisions are well healed.    The patient may return to see me on an as needed basis.    Ramón Garcia MD  [facility]

## 2017-04-04 NOTE — PLAN OF CARE
Problem: Goal Outcome Summary  Goal: Goal Outcome Summary  Outcome: Improving  ICU End of Shift Summary.  For vital signs and complete assessments, please see documentation flowsheets.      Pertinent assessments: VSS. RA, lungs dim. Heart regular. Voiding.   Major Shift Events: Femoral sheath pulled at 2250 last night. Ecchymotic area increased in size around 0330, outlined with marker.  TR band site no complications.    Plan (Upcoming Events): Patient off bedrest this AM.    Discharge/Transfer Needs: Pending.      Bedside Shift Report Completed :   Bedside Safety Check Completed:

## 2017-04-04 NOTE — PROGRESS NOTES
"Cardiology Progress Note  Janette Vo, APRN          Assessment and Plan:   Admit (4/2) with prolonged episode of CP w/ assoc emesis.  Evidence of NSTEMI.  Recent SBO w/ lysis of adhesions  PMH:  HTN/hyperlipidemia    NSTEMI:  -troponin peak 13/ST abnormalities precordial leads/LVEF 60% w/ basal,lateral RWMA  -No further CP overnight w/ rising troponins  -multiple CVRF:  HTN/hyperlipidemia  -(4/3) s/p 2.5x24mm KHANG to mid CFX.  Remaining 75% dx in small D2, mod dx in mid LAD, 70% dx in prox RCA (FFR -)  -bblocker/ASA/ACe-I/statin/Brilinta for 1 yr  -CR  Home later today  -f/u with [facility] heart [facility] in 2 wks with BMP  F/u with Dr. Benites in 2 months    HTN:  -elevated on admit--> lisinopril added  -mild/mod LVH    Hyperlipidemia:  -on statin--> LDL 68  -will need f/u lipid panel iin 2 months               Interval History:   No CP/SOB/palpiations  Wife at bedside                Medications:       pneumococcal vaccine  0.5 mL Intramuscular Prior to discharge     lisinopril  20 mg Oral Daily     sodium chloride (PF)  3 mL Intracatheter Q8H     aspirin EC  81 mg Oral Daily     ticagrelor  90 mg Oral Q12H     metoprolol (LOPRESSOR) tablet 25 mg  25 mg Oral BID     atorvastatin  40 mg Oral Daily            Physical Exam:   Blood pressure 113/89, pulse 71, temperature 97.7  F (36.5  C), temperature source Oral, resp. rate 21, height 1.753 m (5' 9\"), weight 83.4 kg (183 lb 13.8 oz), SpO2 92 %.  Wt Readings from Last 3 Encounters:   04/04/17 83.4 kg (183 lb 13.8 oz)   03/26/17 83.6 kg (184 lb 4.8 oz)   09/18/15 85 kg (187 lb 8 oz)     I/O last 3 completed shifts:  In: 1229.01 [P.O.:120; I.V.:1109.01]  Out: 1000 [Urine:1000]    CONST:  Alert and oriented  NAD  LUNGS:  CTA bilat  CARDIO:  RRR, S1, S2  ABD:  Soft  +BS  EXT:  No edema  2+DP bilat  Right femoral puncture site w/ large area of ecchymosis  No bruit/hematoma           Data:   TELE:  SR with first degree AV block    CBC    Recent Labs  Lab 04/04/17  0522 04/02/17  0804   WBC " 8.9 6.2   HGB 14.1 15.3    142*       BMP    Recent Labs  Lab 04/04/17  0522 04/03/17  0548 04/02/17  0254     --  140   POTASSIUM 3.9 3.9 3.9   CHLORIDE 108  --  105   JOANN 8.2*  --  8.5   CO2 23  --  25   BUN 13  --  17   CR 0.90  --  0.80   *  --  126*     Recent Labs   Lab Test  04/02/17   0804  02/11/15   0737   CHOL  129   --    HDL  44   --    LDL  68   --    TRIG  83  77       TROP  Lab Results   Component Value Date    TROPI 13.332 () 04/02/2017    TROPI 10.457 () 04/02/2017    TROPI 2.200 () 04/02/2017    TROPI 0.173 () 04/02/2017    TROPI  02/10/2015     <0.015  The 99th percentile for upper reference range is 0.045 ug/L.  Troponin values in   the range of 0.045 - 0.120 ug/L may be associated with risks of adverse   clinical events.   Effective 7/30/2014, the reference range for this assay has changed to reflect   new instrumentation/methodology.         BNP  No results for input(s): NTBNPI, NTBNP in the last 168 hours.

## 2017-04-04 NOTE — PLAN OF CARE
Problem: Goal Outcome Summary  Goal: Goal Outcome Summary  Cardiac Rehab:  eval complete and treatment initiated.  Pt admitted with NSTEMI s/p stent and recent hospitalization for SBO with lysis of adhesions. PMH: HTN.  He reported to be independent in ADLs and mobility using a walker and cane PTA.  On this date Pt alert, oriented and able to follow directions.  OT issued handouts and provided education in benefit of Cardiac Rehab/walking, signs/symptoms of activity intolerance and controllable risk factors.  Pt able to verbalize good understanding.  Supine>sit completed with SBA.  Close SBA for sitting balance at EOB with complaints of dizziness.  Min A needed for UE dressing and max A for LE dressing.  Sit<>stand and functional mobility x 30' with slow unsteady gait and min A using FWW.  Pt with complaints of B knee pain and reported minimal participation in mobility at home.  Pt's wife identified him taking only  steps/day since recent surgery/hospitalization.  Pt back to room and up in chair with min A and cueing for safety, sequencing, hand placement.  Pt with increased GARCIA, but VSS.  Before walking:                       After walking:  /92                               118/97  HR  100                                   102  O2  95%                                  98%  Given level of weakness, limited mobility and need to climb stairs to access home and bed/bathroom, do not feel Pt is safe to D/C home today.  Pt only participated in minimal activity and was unable to tolerate trial of stairs.  Plan to schedule Pt for additional Cardiac Rehab tomorrow.  Recommend Home with OP Phase II Cardiac Rehab vs TCU pending progress.

## 2017-04-04 NOTE — PROGRESS NOTES
04/04/17 1000   Quick Adds   Type of Visit Initial Occupational Therapy Evaluation   Living Environment   Lives With spouse   Living Arrangements house   Home Accessibility stairs to enter home;stairs within home;bed and bath are not on the first floor   Number of Stairs to Enter Home 1   Number of Stairs Within Home 12   Stair Railings at Home inside, present at both sides   Transportation Available car;family or friend will provide   Living Environment Comment Pt reported to be independent in mobility using a rolling walker in the home and cane outside.  Wife does cooking, homemaking and driving.   Self-Care   Dominant Hand right   Usual Activity Tolerance moderate   Current Activity Tolerance fair   Regular Exercise no   Equipment Currently Used at Home walker, rolling;cane, straight;shower chair;grab bar   Activity/Exercise/Self-Care Comment Pt reported to be independent in ADLs at baseline.  Participated in seated HEP 3 weeks ago.   Functional Level Prior   Ambulation 1-->assistive equipment   Transferring 1-->assistive equipment   Toileting 1-->assistive equipment   Bathing 1-->assistive equipment   Dressing 0-->independent   Eating 0-->independent   Communication 0-->understands/communicates without difficulty   Swallowing 0-->swallows foods/liquids without difficulty   Cognition 0 - no cognition issues reported   Fall history within last six months no   Which of the above functional risks had a recent onset or change? ambulation;transferring       Present no   General Information   Onset of Illness/Injury or Date of Surgery - Date 04/02/17   Referring Physician Dr. Frank Nelson   Patient/Family Goals Statement Return to home.   Additional Occupational Profile Info/Pertinent History of Current Problem Pt admitted with NSTEMI s/p stent and recent hospitalization for SBO with lysis of adhesions.  PMH:  HTN.   Precautions/Limitations fall precautions   Cognitive Status Examination    Orientation person;place   Level of Consciousness alert   Able to Follow Commands mild impairment   Memory impaired   Executive Function Self awareness/monitoring impaired;Working memory impaired, decreased storage of information for performing tasks   Visual Perception   Visual Perception Wears glasses   Sensory Examination   Sensory Quick Adds No deficits were identified   Pain Assessment   Patient Currently in Pain Yes, see Vital Sign flowsheet   Integumentary/Edema   Integumentary/Edema no deficits were identifed   Posture   Posture forward head position   Range of Motion (ROM)   ROM Quick Adds No deficits were identified   ROM Comment B UE AROM WFL   Strength   Manual Muscle Testing Quick Adds Other   Strength Comments B UE strength not formally tested.  Grossly 4/5 per observation.   Hand Strength   Hand Strength Comments WFL   Muscle Tone Assessment   Muscle Tone Quick Adds No deficits were identified   Coordination   Upper Extremity Coordination No deficits were identified   Mobility   Bed Mobility Comments SBA   Transfer Skill: Bed to Chair/Chair to Bed   Level of [place]: Bed to Chair minimum assist (75% patients effort)   Physical Assist/Nonphysical Assist: Bed to Chair verbal cues   Assistive Device - Transfer Skill Bed to Chair Chair to Bed Rehab Eval rolling walker   Transfer Skill: Sit to Stand   Level of [place]: Sit/Stand minimum assist (75% patients effort)   Physical Assist/Nonphysical Assist: Sit/Stand verbal cues   Assistive Device for Transfer: Sit/Stand rolling walker   Upper Body Dressing   Level of [place]: Dress Upper Body minimum assist (75% patients effort)   Physical Assist/Nonphysical Assist: Dress Upper Body set-up required   Lower Body Dressing   Level of [place]: Dress Lower Body maximum assist (25% patients effort)   Physical Assist/Nonphysical Assist: Dress Lower Body set-up required   Grooming   Level of [place]: Grooming minimum assist (75% patients  "effort)   Physical Assist/Nonphysical Assist: Grooming set-up required   Eating/Self Feeding   Level of [place]: Eating stand-by assist   Physical Assist/Nonphysical Assist: Eating set-up required   Activities of Daily Living Analysis   Impairments Contributing to Impaired Activities of Daily Living balance impaired;pain;strength decreased   General Therapy Interventions   Planned Therapy Interventions ADL retraining;transfer training;strengthening;risk factor education   Clinical Impression   Criteria for Skilled Therapeutic Interventions Met yes, treatment indicated   OT Diagnosis decreased ADL performance   Influenced by the following impairments NSTEMI, weakness, pain   Assessment of Occupational Performance 3-5 Performance Deficits   Identified Performance Deficits Pt with decreased ability to participate in dressing, toileting, bathing, mobility   Clinical Decision Making (Complexity) Low complexity   Therapy Frequency daily   Predicted Duration of Therapy Intervention (days/wks) 3 days   Anticipated Discharge Disposition Home with Outpatient Therapy;Transitional Care Facility   Risks and Benefits of Treatment have been explained. Yes   Patient, Family & other staff in agreement with plan of care Yes   [facility] TM \"6 Clicks\"   2016, Trustees of [facility], under license to [employer].  All rights reserved.   6 Clicks Short Forms Daily Activity Inpatient Short Form   [facility] AM-PAC  \"6 Clicks\" Daily Activity Inpatient Short Form   1. Putting on and taking off regular lower body clothing? 2 - A Lot   2. Bathing (including washing, rinsing, drying)? 2 - A Lot   3. Toileting, which includes using toilet, bedpan or urinal? 2 - A Lot   4. Putting on and taking off regular upper body clothing? 3 - A Little   5. Taking care of personal grooming such as brushing teeth? 3 - A Little   6. Eating meals? 4 - None   Daily Activity Raw Score (Score out of 24.Lower scores equate to " lower levels of function) 16   Total Evaluation Time   Total Evaluation Time (Minutes) 10

## 2017-04-04 NOTE — PLAN OF CARE
Problem: Goal Outcome Summary  Goal: Goal Outcome Summary  Outcome: Improving  ICU End of Shift Summary.  For vital signs and complete assessments, please see documentation flowsheets.   Pt denies pain. Walked in hallway with rehab and pt got dizzy and needed to stop exercising. Did not do stairs. Discharge delayed today - will probably go tomorrow. Unsure if rehab will attempt stairs tomorrow. Pt states he was tired so went back to bed and slept and now feels better. Walked to bathroom and did well. Pt less anxious also. Vitals stable.   R groin site very ecchymotic also R wrist. Pt educated on restrictions with lifting and stooping etc. Dr EDWARD and Janette nurse practitioner saw groin site. No bruit.      Pertinent assessments: As above  Major Shift Events: discharge delayed due to pt feeling dizzy.  Plan (Upcoming Events): ? If needs TCU or discharge tomorrow  Discharge/Transfer Needs:      Bedside Shift Report Completed :   Bedside Safety Check Completed:

## 2017-04-05 LAB
INTERPRETATION ECG - MUSE: NORMAL
MAGNESIUM SERPL-MCNC: 1.8 MG/DL (ref 1.6–2.3)
POTASSIUM SERPL-SCNC: 3.7 MMOL/L (ref 3.4–5.3)
TSH SERPL DL<=0.005 MIU/L-ACNC: 3.24 MU/L (ref 0.4–4)

## 2017-04-05 PROCEDURE — A9270 NON-COVERED ITEM OR SERVICE: HCPCS | Mod: GY | Performed by: INTERNAL MEDICINE

## 2017-04-05 PROCEDURE — 25000132 ZZH RX MED GY IP 250 OP 250 PS 637: Mod: GY | Performed by: INTERNAL MEDICINE

## 2017-04-05 PROCEDURE — 84443 ASSAY THYROID STIM HORMONE: CPT | Performed by: INTERNAL MEDICINE

## 2017-04-05 PROCEDURE — 99232 SBSQ HOSP IP/OBS MODERATE 35: CPT | Performed by: INTERNAL MEDICINE

## 2017-04-05 PROCEDURE — 99233 SBSQ HOSP IP/OBS HIGH 50: CPT | Performed by: INTERNAL MEDICINE

## 2017-04-05 PROCEDURE — 84132 ASSAY OF SERUM POTASSIUM: CPT | Performed by: INTERNAL MEDICINE

## 2017-04-05 PROCEDURE — 93005 ELECTROCARDIOGRAM TRACING: CPT

## 2017-04-05 PROCEDURE — 25000125 ZZHC RX 250: Performed by: INTERNAL MEDICINE

## 2017-04-05 PROCEDURE — 83735 ASSAY OF MAGNESIUM: CPT | Performed by: INTERNAL MEDICINE

## 2017-04-05 PROCEDURE — 36415 COLL VENOUS BLD VENIPUNCTURE: CPT | Performed by: INTERNAL MEDICINE

## 2017-04-05 PROCEDURE — 12000007 ZZH R&B INTERMEDIATE

## 2017-04-05 RX ORDER — POTASSIUM CHLORIDE 7.45 MG/ML
10 INJECTION INTRAVENOUS
Status: DISCONTINUED | OUTPATIENT
Start: 2017-04-05 | End: 2017-04-06 | Stop reason: HOSPADM

## 2017-04-05 RX ORDER — MAGNESIUM SULFATE HEPTAHYDRATE 40 MG/ML
4 INJECTION, SOLUTION INTRAVENOUS EVERY 4 HOURS PRN
Status: DISCONTINUED | OUTPATIENT
Start: 2017-04-05 | End: 2017-04-06 | Stop reason: HOSPADM

## 2017-04-05 RX ORDER — POTASSIUM CHLORIDE 1.5 G/1.58G
20-40 POWDER, FOR SOLUTION ORAL
Status: DISCONTINUED | OUTPATIENT
Start: 2017-04-05 | End: 2017-04-06 | Stop reason: HOSPADM

## 2017-04-05 RX ORDER — POTASSIUM CHLORIDE 1500 MG/1
20-40 TABLET, EXTENDED RELEASE ORAL
Status: DISCONTINUED | OUTPATIENT
Start: 2017-04-05 | End: 2017-04-06 | Stop reason: HOSPADM

## 2017-04-05 RX ORDER — LORAZEPAM 0.5 MG/1
.5-1 TABLET ORAL EVERY 6 HOURS PRN
Status: DISCONTINUED | OUTPATIENT
Start: 2017-04-05 | End: 2017-04-06 | Stop reason: HOSPADM

## 2017-04-05 RX ORDER — POTASSIUM CHLORIDE 29.8 MG/ML
20 INJECTION INTRAVENOUS
Status: DISCONTINUED | OUTPATIENT
Start: 2017-04-05 | End: 2017-04-06 | Stop reason: HOSPADM

## 2017-04-05 RX ORDER — LORAZEPAM 0.5 MG/1
0.25 TABLET ORAL EVERY 6 HOURS PRN
Status: DISCONTINUED | OUTPATIENT
Start: 2017-04-05 | End: 2017-04-05

## 2017-04-05 RX ORDER — LORAZEPAM 0.5 MG/1
.5-1 TABLET ORAL EVERY 6 HOURS PRN
Status: DISCONTINUED | OUTPATIENT
Start: 2017-04-05 | End: 2017-04-05

## 2017-04-05 RX ADMIN — LORAZEPAM 0.5 MG: 0.5 TABLET ORAL at 16:45

## 2017-04-05 RX ADMIN — METOPROLOL TARTRATE 25 MG: 25 TABLET ORAL at 08:42

## 2017-04-05 RX ADMIN — ATORVASTATIN CALCIUM 40 MG: 40 TABLET, FILM COATED ORAL at 08:42

## 2017-04-05 RX ADMIN — APIXABAN 5 MG: 5 TABLET, FILM COATED ORAL at 21:27

## 2017-04-05 RX ADMIN — Medication 2 G: at 21:07

## 2017-04-05 RX ADMIN — LISINOPRIL 20 MG: 20 TABLET ORAL at 08:42

## 2017-04-05 RX ADMIN — ASPIRIN 81 MG: 81 TABLET, COATED ORAL at 08:42

## 2017-04-05 RX ADMIN — POTASSIUM CHLORIDE 20 MEQ: 1500 TABLET, EXTENDED RELEASE ORAL at 18:20

## 2017-04-05 RX ADMIN — LORAZEPAM 0.5 MG: 0.5 TABLET ORAL at 21:36

## 2017-04-05 RX ADMIN — TICAGRELOR 90 MG: 90 TABLET ORAL at 12:58

## 2017-04-05 RX ADMIN — METOPROLOL TARTRATE 25 MG: 25 TABLET ORAL at 21:26

## 2017-04-05 RX ADMIN — TICAGRELOR 90 MG: 90 TABLET ORAL at 21:26

## 2017-04-05 NOTE — PLAN OF CARE
Problem: Cardiac: Acute Coronary Syndrome (ACS) (Adult)  Goal: Signs and Symptoms of Listed Potential Problems Will be Absent or Manageable (Cardiac: Acute Coronary Syndrome)  Signs and symptoms of listed potential problems will be absent or manageable by discharge/transition of care (reference Cardiac: Acute Coronary Syndrome (ACS) (Adult) CPG).   Outcome: No Change    04/04/17 2232   Cardiac: Acute Coronary Syndrome (ACS)   Problems Assessed (Acute Coronary Syndrome (ACS)) all   Problems Present (Acute Coronary Syndrome (ACS)) dysrhythmia/arrhythmia   [id number]  Tele: SR/SA PACs BBB ST depression. Physical assessment WDL except bilateral wrists ecchymotic from failed approaches for angio, right groin ecchymosis beyond marked areas from angio, all pulses positive, feet cool @ baseline. Refer to VS, I/O, Adult PCS for full assessment & shift details. Disposition anticipated for 4-5-17 pending tolerance w/cardiac rehab.   Maty Melton, RN, BSN  Medical/Telemetry - 3

## 2017-04-05 NOTE — PROGRESS NOTES
[facility]  Hospitalist Progress Note    Name: Pablo Fuentes    MRN: [id number]  Provider:  Janusz Soares MD.    Date of Service: 04/05/2017     Reason for Stay (Diagnosis): NSTEMI, Afib         Summary of hospital stay & Assessment/Plan:   Summary of Stay: Pablo Fuentes is a 76 year old male who was admitted on 4/2/2017  76-year-old male with a history of hypertension, recent admission to the hospital one week ago for a small bowel obstruction for which he underwent lysis of adhesions, who presents to the hospital this evening for concerns about chest pain. Onset of chest pain was approximately 10 hours PTP, found to have NSTEMI and underwent coronary angiogram on 04/03, KHANG placed to mid CFX.    Problem List:   1. NSTEMI s/p KHANG to mid CFX on 04/03- NO chest pain, GUERITA showed EF 60 % with +RWMA.  - Continue Statin, ASA, Brilinta, BB, ACEI as ordered.  2. Hypertension- Better controlled  - Continue ACE and BB.  3. Recent hiatal hernia repair and laparoscopic surgery with adhesion lysis for bowel obstruction, no abdominal symptoms- stable.  4. Dizziness and generalized weakness- This is new to him, continue to monitor, check Orthostatic vitals, fall precaution. PT/OT consulted, discussed with PT.  4. Afib rate controlled- new onset. Cardiology paged for input. This occurred 2 days post angiogram. On BB, may need to increase the dose as BP allows. CHADS-Vas score is 4, which is about 5 % risk per year for CVA. Cardiology to address anticoagulation as he is on Brilinta and ASA as well at this time. Replace lytes. Check TSH with AM lab.    DVT Prophylaxis: PCD,. ambulate  Code Status:  Full Code  Discharge Dispo: home  Estimated # of Days until Disch: 1-2 day if stable.  Patient dizziness improved today, but he developed Afib which is new, needs to be monitored and reevaluated by cardiologist..         Interval History:   Patient seen and examined. no chest pain, his energy level better. No N/V.  Dizziness also better,. His wife at bedside. He developed new onset Afib, now rate controlled. All their question and concerns addressed  Case also discussed with pt nurse                Physical Exam:   Physical Exam   Temp: 97.6  F (36.4  C) Temp src: Oral BP: 111/77   Heart Rate: 76 Resp: 18 SpO2: 96 % O2 Device: None (Room air)    Vitals:    04/02/17 0403 04/02/17 0529 04/04/17 0400   Weight: 83.5 kg (184 lb) 84.7 kg (186 lb 11.2 oz) 83.4 kg (183 lb 13.8 oz)     I/O last 3 completed shifts:  In: 486 [P.O.:480; I.V.:6]  Out: 500 [Urine:500]        GENERAL:  Comfortable.   PSYCH: pleasant, oriented, No acute distress.  EYES: PERRLA, Normal conjunctiva.  HEART:  Irregular S1, S2 with no edema.  LUNGS:  Clear to auscultation, normal Respiratory effort.  ABDOMEN:  Soft, no hepatosplenomegaly, normal bowel sounds.  SKIN:  Dry to touch, No rash.      Medications     Percutaneous Coronary Intervention orders placed (this is information for BPA alerting)       - MEDICATION INSTRUCTIONS -       Continuing ACE inhibitor/ARB from home medication list OR ACE inhibitor/ARB order already placed during this visit       - MEDICATION INSTRUCTIONS -       - MEDICATION INSTRUCTIONS -         pneumococcal vaccine  0.5 mL Intramuscular Prior to discharge     lisinopril  20 mg Oral Daily     sodium chloride (PF)  3 mL Intracatheter Q8H     aspirin EC  81 mg Oral Daily     ticagrelor  90 mg Oral Q12H     metoprolol (LOPRESSOR) tablet 25 mg  25 mg Oral BID     atorvastatin  40 mg Oral Daily     Data     -Data reviewed today:  I personally reviewed  all new labs and imaging results over the last 24 hours.      Recent Labs  Lab 04/04/17 0522 04/02/17  0804 04/02/17  0254   WBC 8.9 6.2 8.0   HGB 14.1 15.3 16.3   HCT 40.9 42.5 46.9   MCV 97 96 97    142* 211       Recent Labs  Lab 04/04/17 0522 04/03/17  0548 04/02/17  0254     --  140   POTASSIUM 3.9 3.9 3.9   CHLORIDE 108  --  105   CO2 23  --  25   ANIONGAP 9  --  10   GLC  109*  --  126*   BUN 13  --  17   CR 0.90  --  0.80   GFRESTIMATED 81  --  >90Non  GFR Calc   GFRESTBLACK >90African American GFR Calc  --  >90African American GFR Calc   JOANN 8.2*  --  8.5       No results found for this or any previous visit (from the past 24 hour(s)).

## 2017-04-05 NOTE — PLAN OF CARE
Problem: Goal Outcome Summary  Goal: Goal Outcome Summary  Outcome: Improving  Orientation: Alert and oriented x4  VSS. 96% on RA.   Tele:SR/SA with PACs, BBB, and ST depression. HR 60s.   LS: clear throughout   GI: Passing gas. No BM. Denies N/V.   : Adequate urine output. Clear yellow.   Skin: Incisions open to air on bilateral wrists. Ecchymosis noted. Incision to R groin UTV. Dressing C/D/I. Significant ecchymosis noted around site.   Activity: Up with 1 walker. Pt slept comfortably throughout shift.   Pain: Denies pain throughout shift.   Plan: Continue with current cares.

## 2017-04-05 NOTE — PROGRESS NOTES
"Cardiology Progress Note  Janette Vo, APRN          Assessment and Plan:   Admit (4/2) with prolonged episode of CP w/ assoc emesis.  Evidence of NSTEMI.  Recent SBO w/ lysis of adhesions  PMH:  HTN/hyperlipidemia    He went into rate controlled afib this AM.  No symptoms.    1. NSTEMI  -troponin peak 13/ST abnormalities precordial leads/LVEF 60% w/ basal,lateral RWMA  -No further CP overnight w/ rising troponins  -multiple CVRF:  HTN/hyperlipidemia  -(4/3) s/p 2.5x24mm KHANG to mid CFX.  Remaining 75% dx in small D2, mod dx in mid LAD, 70% dx in prox RCA (FFR -)  -bblocker/ASA/ACe-I/statin/Brilinta for 1 yr  -f/u with [facility] heart SURAJ in 2 wks with BMP  F/u with Dr. Benites in 2 months    2. New afib  - start Eliquis this PM, would continue for 1 month post-DCCV if afib does not recur  - cardioversion tomorrow AM if still in afib; no GUERITA needed as onset of afib is definitively known and it is less than 48 hours    3. Hyperlipidemia:  -on statin--> LDL 68  -will need f/u lipid panel iin 2 months               Interval History:   No CP/SOB/palpiations  Wife at bedside                Medications:       pneumococcal vaccine  0.5 mL Intramuscular Prior to discharge     lisinopril  20 mg Oral Daily     sodium chloride (PF)  3 mL Intracatheter Q8H     aspirin EC  81 mg Oral Daily     ticagrelor  90 mg Oral Q12H     metoprolol (LOPRESSOR) tablet 25 mg  25 mg Oral BID     atorvastatin  40 mg Oral Daily            Physical Exam:   Blood pressure 111/77, pulse 71, temperature 97.6  F (36.4  C), temperature source Oral, resp. rate 18, height 1.753 m (5' 9\"), weight 83.4 kg (183 lb 13.8 oz), SpO2 96 %.  Wt Readings from Last 3 Encounters:   04/04/17 83.4 kg (183 lb 13.8 oz)   03/26/17 83.6 kg (184 lb 4.8 oz)   09/18/15 85 kg (187 lb 8 oz)     I/O last 3 completed shifts:  In: 246 [P.O.:240; I.V.:6]  Out: 650 [Urine:650]    CONST:  Alert and oriented  NAD  LUNGS:  CTA bilat  CARDIO:  Regular rate, totally irregular rhythm  ABD:  Soft  " +BS  EXT:  No edema  2+DP bilat  Right femoral puncture site w/ large area of ecchymosis  No bruit/hematoma           Data:   TELE:  SR with first degree AV block  Went into afib around 11AM today, rates 70-80's    Labs:  L 3.7, Mg 1.8

## 2017-04-06 ENCOUNTER — APPOINTMENT (OUTPATIENT)
Dept: OCCUPATIONAL THERAPY | Facility: CLINIC | Age: 77
DRG: 247 | End: 2017-04-06
Payer: MEDICARE

## 2017-04-06 VITALS
TEMPERATURE: 96.2 F | OXYGEN SATURATION: 95 % | WEIGHT: 168.6 LBS | HEIGHT: 69 IN | BODY MASS INDEX: 24.97 KG/M2 | DIASTOLIC BLOOD PRESSURE: 69 MMHG | SYSTOLIC BLOOD PRESSURE: 107 MMHG | RESPIRATION RATE: 16 BRPM | HEART RATE: 71 BPM

## 2017-04-06 LAB
INTERPRETATION ECG - MUSE: NORMAL
MAGNESIUM SERPL-MCNC: 2.3 MG/DL (ref 1.6–2.3)
POTASSIUM SERPL-SCNC: 3.9 MMOL/L (ref 3.4–5.3)

## 2017-04-06 PROCEDURE — 0296T ZIO PATCH HOLTER: CPT | Performed by: PHYSICIAN ASSISTANT

## 2017-04-06 PROCEDURE — 25000132 ZZH RX MED GY IP 250 OP 250 PS 637: Mod: GY | Performed by: INTERNAL MEDICINE

## 2017-04-06 PROCEDURE — 99231 SBSQ HOSP IP/OBS SF/LOW 25: CPT | Performed by: INTERNAL MEDICINE

## 2017-04-06 PROCEDURE — A9270 NON-COVERED ITEM OR SERVICE: HCPCS | Mod: GY | Performed by: INTERNAL MEDICINE

## 2017-04-06 PROCEDURE — 84132 ASSAY OF SERUM POTASSIUM: CPT | Performed by: INTERNAL MEDICINE

## 2017-04-06 PROCEDURE — 99239 HOSP IP/OBS DSCHRG MGMT >30: CPT | Performed by: INTERNAL MEDICINE

## 2017-04-06 PROCEDURE — 36415 COLL VENOUS BLD VENIPUNCTURE: CPT | Performed by: INTERNAL MEDICINE

## 2017-04-06 PROCEDURE — 83735 ASSAY OF MAGNESIUM: CPT | Performed by: INTERNAL MEDICINE

## 2017-04-06 PROCEDURE — 97530 THERAPEUTIC ACTIVITIES: CPT | Mod: GO | Performed by: REHABILITATION PRACTITIONER

## 2017-04-06 PROCEDURE — 40000133 ZZH STATISTIC OT WARD VISIT: Performed by: REHABILITATION PRACTITIONER

## 2017-04-06 RX ORDER — LISINOPRIL 10 MG/1
10 TABLET ORAL DAILY
Qty: 30 TABLET | Refills: 3 | Status: SHIPPED | OUTPATIENT
Start: 2017-04-06 | End: 2017-04-18

## 2017-04-06 RX ORDER — NITROGLYCERIN 0.4 MG/1
TABLET SUBLINGUAL
Qty: 25 TABLET | Refills: 0 | Status: SHIPPED | OUTPATIENT
Start: 2017-04-06

## 2017-04-06 RX ORDER — ATORVASTATIN CALCIUM 40 MG/1
40 TABLET, FILM COATED ORAL DAILY
Qty: 30 TABLET | Refills: 3 | Status: SHIPPED | OUTPATIENT
Start: 2017-04-06

## 2017-04-06 RX ORDER — LORAZEPAM 0.5 MG/1
0.5 TABLET ORAL EVERY 8 HOURS PRN
Qty: 21 TABLET | Refills: 0 | Status: SHIPPED | OUTPATIENT
Start: 2017-04-06 | End: 2018-09-10

## 2017-04-06 RX ORDER — ACETAMINOPHEN 325 MG/1
325-650 TABLET ORAL EVERY 4 HOURS PRN
Qty: 100 TABLET
Start: 2017-04-06 | End: 2017-04-18

## 2017-04-06 RX ADMIN — METOPROLOL TARTRATE 25 MG: 25 TABLET ORAL at 08:53

## 2017-04-06 RX ADMIN — LORAZEPAM 0.5 MG: 0.5 TABLET ORAL at 07:00

## 2017-04-06 RX ADMIN — LISINOPRIL 20 MG: 20 TABLET ORAL at 08:53

## 2017-04-06 RX ADMIN — TICAGRELOR 90 MG: 90 TABLET ORAL at 10:27

## 2017-04-06 RX ADMIN — LORAZEPAM 0.5 MG: 0.5 TABLET ORAL at 01:06

## 2017-04-06 RX ADMIN — ASPIRIN 81 MG: 81 TABLET, COATED ORAL at 08:53

## 2017-04-06 RX ADMIN — ATORVASTATIN CALCIUM 40 MG: 40 TABLET, FILM COATED ORAL at 08:53

## 2017-04-06 NOTE — PLAN OF CARE
Problem: Individualization  Goal: Patient Preferences  Outcome: No Change  Pt converted to afib (from SA with PACs) this am. When noted, MD was notified. Cardiology added eliquis and pt will be NPO after MN for possible cardioversion in am. Pt asymptomatic. Cath site to right groin intact; ecchymotic, but soft. Encouraged pt to be up in chair for meals. Tolerating PO. PT/OT following; pt has generalized weakness.

## 2017-04-06 NOTE — PLAN OF CARE
Problem: Goal Outcome Summary  Goal: Goal Outcome Summary  Outcome: Improving  Pt converted to afib (from SA with PACs) this am. When noted, MD was notified. Cardiology added eliquis and pt will be NPO after MN for possible cardioversion in am. Pt asymptomatic. Cath site to right groin intact; ecchymotic, but soft. Encouraged pt to be up in chair for meals. Tolerating PO. PT/OT following; pt has generalized weakness. VSS. Tele Afib, 80's.

## 2017-04-06 NOTE — PLAN OF CARE
Problem: Goal Outcome Summary  Goal: Goal Outcome Summary  Occupational Therapy Discharge Summary     Reason for therapy discharge:    Discharged to home with outpatient therapy.     Progress towards therapy goal(s). See goals on Care Plan in [facility] electronic health record for goal details.  Goals partially met.  Barriers to achieving goals:   discharge from facility.     Therapy recommendation(s):    Continued therapy is recommended.  Rationale/Recommendations:  Pt would benefit from Phase II Outpatient Cardiac Rehab.

## 2017-04-06 NOTE — PLAN OF CARE
Problem: Goal Outcome Summary  Goal: Goal Outcome Summary  CR:  Pt sitting EOB upon arrival.  Able to complete sit<>stand and functional mobility x 75' with CGA using FWW.  Pt with increased activity tolerance and no complaints of SOB or dizziness.  Pt transported to [facility] via w/c for stairs.  Pt able to go up and down 4 steps with hand rails on both sides with CGA in preparation for return to home.  Both Pt and wife reported walking and stairs to be performed at baseline.  OT reviewed importance of Cardiac Rehab/exercise, signs/symptoms of activity intolerance and controllable risk factors.  Pt able to verbalize understanding with VSS throughout session.  Anticipate he will now be able to return to home with Phase II Outpatient Cardiac Rehab upon D/C.

## 2017-04-06 NOTE — PLAN OF CARE
Problem: Goal Outcome Summary  Goal: Goal Outcome Summary  OT:  Attempted session this afternoon.  RN reported Pt to be in A fib with Cardiology contacted.  Plan to reschedule and continue as appropriate.

## 2017-04-06 NOTE — PLAN OF CARE
Problem: Goal Outcome Summary  Goal: Goal Outcome Summary  Outcome: No Change  Pt. AxOx4, up with assist of 1 and walker, Tele: SR/SA with PAC's, pt. Converted around 0230. Lung sounds clear, room air sat's at 95-96%. Ativan given for c/o anxiety with relief. Bruising to arms and right groin present. Pt. Denies any needs at this time. Will continue with POC.

## 2017-04-06 NOTE — PHARMACY
NoAC/Antiplatelet coverage check.  Patient Medicare D through [employer] with $0 deductible.  Both Eliquis and Brilinta are formulary, pricing for 2017 as follows:    Brilinta  April:  [facility] can provide 1 mo free upon receipt of RX.    May-September: $38/mo  October-December:  $132/mo (40% copay coverage gap)     Eliquis  April:  [facility] can provide 1 mo free upon receipt of RX.    May-September:  $38/mo  October-December:  $154/mo (40% copay coverage gap)     If clopidogrel were to be subbed for Brilinta, Eliquis would stay at $38/mo for the rest of the year, and clopidogrel would stay at $13.    BENI Finch, Pharmacy Technician/Liaison, Discharge Pharmacy [phone]

## 2017-04-06 NOTE — DISCHARGE INSTRUCTIONS
After your  Coronary Artery procedure limit your activity during your first few days at home.Do not strain or lift heavy objects.For 48 hrs DO NOT: drive car,lift over 3-5 lbs,do housework, yardwork,stooping or squatting, have sexual intercourse or drink alcohol. Have an adult stay with you overnight. A small bruise or lump at the insertion site is common. Call your Dr. if swelling or bruising increases or the leg(or arm) in which the catheter was inserted feels cold or numb. For leg,If bleeding occurs lay down and apply firm pressure and call 911.For wrist, sit and apply pressure for 10 min,if bleeding does not stop, call 911. Call your caregiver if the site becomes more painful or for a fever >100.5.Do not have MRI test for 3 months after stent unless ok with cardiology.

## 2017-04-06 NOTE — PROGRESS NOTES
Cardiology Progress Note            Assessment and Plan:   76 year old man with history of HTN, hyperlipidemia. Admit (4/2) with prolonged episode of CP w/ assoc emesis.  Evidence of NSTEMI.  Recent SBO w/ lysis of adhesions.  Into afib 4/5/17. Spontaneously converted. Now in SR.       1. NSTEMI  - troponin peak 13/ST abnormalities precordial leads/LVEF 60% w/ basal, lateral RWMA  - CVRF: HTN/hyperlipidemia  - (4/3/17) s/p 2.5x24mm KHANG to mid CFX (subtotal occlusion).  Remaining 75% small D2, mod disease in mid LAD, 70% prox RCA (FFR -)  - ASA indefinitely, Brilinta x 1 year from stent, BB, ACEi, statin   - Cardiac rehab  - f/u with [facility] [place] heart SURAJ in 2 wks with BMP, F/u with Dr. Benites in 2 months with FLP/ALT    2. New afib, paroxysmal   - Initially started Eliquis, but spontaneously converted and just the one episode.   - Will send out on Zio for 14 days. If recurrence, would need anticoagulation due to [id number] score of at least 4 (Age-2, CAD, HTN).     3. Hyperlipidemia  - started statin--> LDL 68  - will need f/u lipid panel in 2 months    4. HTN  - Home meds amlodipine and metoprolol  - Switch to lisinopril and metoprolol in setting of NSTEMI  - BP looks good today      Lily Jacobo PA-C 4/18/17 with BMP at 1245 and 150 appointment. Will schedule follow up for Zio at this appointment.   Dr. Benites 6/9/17 with lipids/ALT at 0945 and 1045 appointment       Lily Jacobo PA-C 4/6/2017 9:46 AM                       Interval History:   No CP/SOB/palpiations. They are ok with the cost of Brilinta.                  Medications:       pneumococcal vaccine  0.5 mL Intramuscular Prior to discharge     lisinopril  20 mg Oral Daily     sodium chloride (PF)  3 mL Intracatheter Q8H     aspirin EC  81 mg Oral Daily     ticagrelor  90 mg Oral Q12H     metoprolol (LOPRESSOR) tablet 25 mg  25 mg Oral BID     atorvastatin  40 mg Oral Daily            Physical Exam:   Blood pressure 114/70, pulse 71, temperature 97.8  F (36.6  " C), temperature source Oral, resp. rate 16, height 1.753 m (5' 9\"), weight 76.5 kg (168 lb 9.6 oz), SpO2 95 %.  Wt Readings from Last 3 Encounters:   04/06/17 76.5 kg (168 lb 9.6 oz)   03/26/17 83.6 kg (184 lb 4.8 oz)   09/18/15 85 kg (187 lb 8 oz)     I/O last 3 completed shifts:  In: 243 [P.O.:240; I.V.:3]  Out: 825 [Urine:825]    CONST:  Alert and oriented  NAD  LUNGS:  CTA bilat  CARDIO:  Regular rate  ABD:  Soft  +BS  EXT:  No edema  2+DP bilat              Data:   TELE:  SR with first degree AV block      Labs:  4/6/17:  K 3.9, Mg 2.3      "

## 2017-04-06 NOTE — PHARMACY - DISCHARGE MEDICATION RECONCILIATION AND EDUCATION
Pablo Fuentes     1940      male    [id number]                                [id number]    Allergy: Review of patient's allergies indicates no known allergies.    RX: Discharge Medication Consult by Pharmacist  EDUCATION:   Discharge Medication List   Pablo Fuentes   Home Medication Instructions ROBBIE:[id number]    Printed on:04/06/17 1039   Medication Information                      acetaminophen (TYLENOL) 325 MG tablet  Take 1-2 tablets (325-650 mg) by mouth every 4 hours as needed for mild pain or headaches             aspirin EC 81 MG EC tablet  Take 1 tablet (81 mg) by mouth daily             atorvastatin (LIPITOR) 40 MG tablet  Take 1 tablet (40 mg) by mouth daily             Biotin (BIOTIN MAXIMUM STRENGTH) 10 MG TABS tablet  Take 10,000 mcg by mouth daily             Cranberry 300 MG TABS  Take 300 mg by mouth daily             HYDROcodone-acetaminophen (NORCO) 5-325 MG per tablet  Take 1-2 tablets by mouth every 4 hours as needed for moderate to severe pain             lisinopril (PRINIVIL/ZESTRIL) 10 MG tablet  Take 1 tablet (10 mg) by mouth daily             LORazepam (ATIVAN) 0.5 MG tablet  Take 1 tablet (0.5 mg) by mouth every 8 hours as needed for anxiety             Metoprolol Tartrate (LOPRESSOR PO)  Take 25 mg by mouth 2 times daily             Multiple Vitamins-Minerals (CENTRUM SILVER) per tablet  Take 1 tablet by mouth daily             Multiple Vitamins-Minerals (OCUVITE PO)  Take 1 tablet by mouth daily              nitroglycerin (NITROSTAT) 0.4 MG sublingual tablet  For chest pain place 1 tablet under the tongue every 5 minutes for 3 doses. If symptoms persist 5 minutes after 1st dose call 911.             ticagrelor (BRILINTA) 90 MG tablet  Take 1 tablet (90 mg) by mouth every 12 hours                 Patient was informed to STOP taking the following HOME medications:   Amlodipine, Vanquish (asa-apap-caff)    Patient was informed to start taking the following NEW medications:   Apap,  asa, atorvastatin, lisinopril, lorazepam, nitroglycerin, ticagrelor    Patient was educated on the following for each discharge medication:  Rationale for therapy  Duration of treatment  Dosing and or monitoring drug levels  Common side effects  Importance of compliance  Drug/food interactions  Missed doses  Self monitoring parameters    OUTCOMES:  Patient verbalized understanding  Patient's family member (caregiver Tess Drewsujey) verbalized understanding    Left written materials and instructions (Clinical notes from EPIC).  IMPORTANT FOLLOW UP NOTES:   Follow up with primary care provider within 7 days to evaluate medication change and for hospital follow- up cardiac rehab as instructed.   Follow up with cardiology group as instructed.   Follow up on Lipid profile in 2 months.

## 2017-04-07 ENCOUNTER — TELEPHONE (OUTPATIENT)
Dept: SURGERY | Facility: CLINIC | Age: 77
End: 2017-04-07

## 2017-04-07 ENCOUNTER — CARE COORDINATION (OUTPATIENT)
Dept: CARDIOLOGY | Facility: CLINIC | Age: 77
End: 2017-04-07

## 2017-04-07 NOTE — TELEPHONE ENCOUNTER
Name of caller: spouse    Reason for Call:  PT has had loose stools since surgery     Surgeon:  Dr. Garcia    Recent Surgery:  Yes.    If yes, when & what type:  Laparoscopic Lysis of Adhesions and Diagnostic Laparoscopy 3-23-17      Best phone number to reach pt at is: [phone]  Ok to leave a message with medical info? Yes.    Pharmacy preferred (if calling for a refill): n/a

## 2017-04-07 NOTE — TELEPHONE ENCOUNTER
S/p Lap lysis of adhesions and diagnostic lapraroscopy, 3/23/17  Surgeon:  Dr. Garcia    Patient's wife, Tess calling - reports that patient bowel movements since surgery have been loose with some formed pieces as well, which started after his surgery.    Pt has one BM per day. Denies fever/ chills.  No abdominal pain, but does report some discomfort for a short period after having a bowel movement.  No bloody or black stool.    Denies N/V.  Reports he is eating well.      Of note, patient was admitted on 4/2/17 for MI -  See note.  Wife states that  saw patient in hospital during that stay for post-op  Follow up.                                                                                     Wife reports that pt will follow up with cardiology and PCP next week.      Wife will call clinic if stool becomes liquid and increases in frequency, increasing abdominal pain , new or worsening symptoms.  Wife agrees with plan.

## 2017-04-07 NOTE — DISCHARGE SUMMARY
DATE OF ADMISSION:  4/2/2017.       DATE OF DISCHARGE:  4/6/2017.        PRIMARY CARE PHYSICIAN:  Isaac Nunez DO.      DISCHARGE DIAGNOSES:   1.  Non-ST elevation myocardial infarction status post drug-eluting stent to mid circumflex artery.   2.  New onset atrial fibrillation converted to sinus.   3.  Hypertension.   4.  Recent hiatal hernia repair.   5.  Recent laparoscopic surgery with lysis of bowel obstruction.   6.  Generalized weakness and deconditioning.      DISPOSITION:  Home.      DISCHARGE MEDICATIONS:  New    Discharge Medication List as of 4/6/2017  1:28 PM      START taking these medications    Details   aspirin EC 81 MG EC tablet Take 1 tablet (81 mg) by mouth daily, Disp-120 tablet, R-11, Local Print      LORazepam (ATIVAN) 0.5 MG tablet Take 1 tablet (0.5 mg) by mouth every 8 hours as needed for anxiety, Disp-21 tablet, R-0, Local Print      atorvastatin (LIPITOR) 40 MG tablet Take 1 tablet (40 mg) by mouth daily, Disp-30 tablet, R-3, Local Print      ticagrelor (BRILINTA) 90 MG tablet Take 1 tablet (90 mg) by mouth every 12 hours, Disp-60 tablet, R-11, Local Print      nitroglycerin (NITROSTAT) 0.4 MG sublingual tablet For chest pain place 1 tablet under the tongue every 5 minutes for 3 doses. If symptoms persist 5 minutes after 1st dose call 911., Disp-25 tablet, R-0, Local Print      lisinopril (PRINIVIL/ZESTRIL) 10 MG tablet Take 1 tablet (10 mg) by mouth daily, Disp-30 tablet, R-3, Local Print      acetaminophen (TYLENOL) 325 MG tablet Take 1-2 tablets (325-650 mg) by mouth every 4 hours as needed for mild pain or headaches, Disp-100 tablet, No Print Out         CONTINUE these medications which have NOT CHANGED    Details   Biotin (BIOTIN MAXIMUM STRENGTH) 10 MG TABS tablet Take 10,000 mcg by mouth daily, Historical      Cranberry 300 MG TABS Take 300 mg by mouth daily, Historical      !! Multiple Vitamins-Minerals (CENTRUM SILVER) per tablet Take 1 tablet by mouth daily, Historical       HYDROcodone-acetaminophen (NORCO) 5-325 MG per tablet Take 1-2 tablets by mouth every 4 hours as needed for moderate to severe pain, Disp-20 tablet, R-0, Local Print      !! Multiple Vitamins-Minerals (OCUVITE PO) Take 1 tablet by mouth daily , Historical      Metoprolol Tartrate (LOPRESSOR PO) Take 25 mg by mouth 2 times daily, Historical       !! - Potential duplicate medications found. Please discuss with provider.      STOP taking these medications       ASA-APAP-Caff Buffered (VANQUISH) 227-194-33 MG TABS Comments:   Reason for Stopping:         AmLODIPine Besylate (NORVASC PO) Comments:   Reason for Stopping:             DISCHARGE CONDITION:  Stable.      DISCHARGE VITAL SIGNS:  Blood pressure 107/69, pulse rate 71, temperature 96.2, oxygen saturation 95%.      DISCHARGE PHYSICAL EXAMINATION:   GENERAL:  Awake, alert, oriented, comfortable not in any form of distress.   HEENT:  Pink nonicteric, extraocular muscle movement intact.   NECK:  Supple, no JVD, no thyromegaly.   CHEST:  Good air entry bilaterally, no wheezing, crackles or rales.   CARDIOVASCULAR:  S1, S2 were heard, no gallop or murmur.   ABDOMEN:  Soft and nontender and nondistended, positive bowel sounds, no organomegaly.   EXTREMITIES:  No edema, cyanosis or clubbing.   NEURO:  No focal neurologic deficit, cranial nerves grossly intact.     PSYCH:  Normal mood and affect, keeps eye contact, intermittently anxious.      PROCEDURES DONE DURING THIS ADMISSION:  Coronary angiogram.      CONSULTATIONS:  Nutrition and Cardiology.      DISCHARGE DIET:  As tolerated, low-salt diet.      DISCHARGE ACTIVITY:  As tolerated, fall precaution.      DISCHARGE FOLLOWUP:    Primary care provider in 1 week.   Cardiology in 2 weeks with YELENA and again with cardiologist, Dr. Benites, in 2 months.   Patient was discharged with Zio patch for 14 days to check if there is any recurrence of the Afib, follow up at cardiac rehab.      BRIEF HISTORY AND HOSPITAL COURSE:  Jack  TIERRA Zelaya is a 76-year-old gentleman who was admitted on 4/2/2017.  He had history of a recent admission to the hospital 1 week prior to this admission for small-bowel obstruction and he subsequently underwent laparoscopically by Dr. Garcia, then he was discharged home on 03/26 and readmitted 04/02 with acute myocardial infarction.  The patient noted chest pain about 10 hours prior to presentation.  Upon arrival he did not have any significant EKG change but there was an elevated troponin found to be ST elevation myocardial infarction.  He underwent a coronary angiogram with drug-eluting stent placed to mid circumflex artery on 04/03.      The patient was about to be discharged on 04/04 but developed significant dizziness.  He was kept in the hospital for monitoring.  Physical Therapy and Occupational Therapy to evaluate the patient.        The next day, yesterday on 04/05/2017 morning the patient developed atrial fibrillation initially rapid ventricular response then changed to rate controlled.  Cardiology was reconsulted and recommended to for cardioversion this morning but patient spontaneously converted to sinus.  Initially, the patient was started on Eliquis; it was stopped as this is 1 episode.  The patient's CHADS-VASc score is calculated to be 4 which is about 5% risk cerebrovascular incident.  The patient is discharged on Zio patch; if the Afib is recurrent, patient benefit from anticoagulation.  He will follow up with primary care provider and also with cardiology group for further evaluation and management.  I discussed at length with the patient, his wife at bedside the plan of discharge, all of their questions and concerns addressed and showed understanding.      Total time spent coordinating his discharge face-to-face with this patient at bedside was over 35 minutes.         ERICA JACKSON MD             D: 04/06/2017 16:04   T: 04/06/2017 17:22   MT: [id number]      Name:     LUH ZELAYA   MRN:       [id number]        Account:        [id number]   :      1940           Admit Date:     [id number]                                  Discharge Date: 2017      Document: [id number]       cc: Miguel Nunez DO
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
## 2017-04-07 NOTE — PROGRESS NOTES
Called patient to discuss any post hospital d/c questions he may have, review medication changes, and confirm f/u appts. Patient denied any questions regarding new medications or changes to some of his current medications that he/she was taking prior to admission. Patient denied any SOB, chest pain, or light headedness. RN confirmed with patient that he has an apt scheduled on 4/18/17 with [facility] Lily Jacobo (confirmed with patient and patient's wife Tess that patient has cards rehab apt on 4/13/17 as well). Patient advised to call clinic with any cardiac related questions or concerns prior to his apt on 4/18/17. Patient verbalized understanding and agreed with plan.

## 2017-04-12 ENCOUNTER — TELEPHONE (OUTPATIENT)
Dept: SURGERY | Facility: CLINIC | Age: 77
End: 2017-04-12

## 2017-04-12 NOTE — TELEPHONE ENCOUNTER
S/p lap lysis of adhesions and diagnostic laparoscopy 3/23/17  Surgeon:  Dr. Garcia    Patient's wife reports that patient's loose stools have resolved.  Now has hard pellet like stool.  Last BM was two days ago with the exception of some small hard stool today. Wondering if it is ok to take Miralax.      Ok to take Miralax- take per package instructions.  Can add Metameucil once a day until back to a normal pattern of BM's.  Increase fluids.    Wife agrees with plan - she will call clinic if no results from Miralax or if increasing abdominal pain, N/V or new/worsening symptoms.

## 2017-04-13 ENCOUNTER — TELEPHONE (OUTPATIENT)
Dept: CARDIOLOGY | Facility: CLINIC | Age: 77
End: 2017-04-13

## 2017-04-13 ENCOUNTER — HOSPITAL ENCOUNTER (OUTPATIENT)
Dept: CARDIAC REHAB | Facility: CLINIC | Age: 77
End: 2017-04-13
Attending: INTERNAL MEDICINE
Payer: MEDICARE

## 2017-04-13 VITALS — WEIGHT: 180.4 LBS | HEIGHT: 68 IN | BODY MASS INDEX: 27.34 KG/M2

## 2017-04-13 DIAGNOSIS — I21.4 NSTEMI (NON-ST ELEVATED MYOCARDIAL INFARCTION) (H): ICD-10-CM

## 2017-04-13 PROCEDURE — 40000116 ZZH STATISTIC OP CR VISIT: Performed by: OCCUPATIONAL THERAPIST

## 2017-04-13 PROCEDURE — 93798 PHYS/QHP OP CAR RHAB W/ECG: CPT | Performed by: OCCUPATIONAL THERAPIST

## 2017-04-13 PROCEDURE — 40000575 ZZH STATISTIC OP CARDIAC VISIT #2: Performed by: OCCUPATIONAL THERAPIST

## 2017-04-13 PROCEDURE — 93797 PHYS/QHP OP CAR RHAB WO ECG: CPT | Performed by: OCCUPATIONAL THERAPIST

## 2017-04-13 ASSESSMENT — 6 MINUTE WALK TEST (6MWT)
MALE CALC: 1517.36
TOTAL DISTANCE WALKED (FT): 145
FEMALE CALC: 1288.45
PREDICTED: 1526.61
GENDER SELECTION: MALE

## 2017-04-13 NOTE — TELEPHONE ENCOUNTER
Attempted to call pt to see if he has returned the 14 day ziopatch or if he is still wearing it. Pt is scheduled to see Lily Jacobo PA-C 4/18/17, was recently hospitalized for NSTEMI an it's documented that he was discharged with a ziopatch in place. Epic reads that the ziopatch was ordered. When pt calls back will verify status.   Joe CARO
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
## 2017-04-18 ENCOUNTER — OFFICE VISIT (OUTPATIENT)
Dept: CARDIOLOGY | Facility: CLINIC | Age: 77
End: 2017-04-18
Attending: NURSE PRACTITIONER
Payer: MEDICARE

## 2017-04-18 VITALS
WEIGHT: 180.7 LBS | BODY MASS INDEX: 25.87 KG/M2 | HEART RATE: 80 BPM | HEIGHT: 70 IN | SYSTOLIC BLOOD PRESSURE: 98 MMHG | DIASTOLIC BLOOD PRESSURE: 76 MMHG

## 2017-04-18 DIAGNOSIS — I21.4 NSTEMI (NON-ST ELEVATED MYOCARDIAL INFARCTION) (H): ICD-10-CM

## 2017-04-18 DIAGNOSIS — I10 ESSENTIAL HYPERTENSION: ICD-10-CM

## 2017-04-18 DIAGNOSIS — I48.0 PAROXYSMAL ATRIAL FIBRILLATION (H): Primary | ICD-10-CM

## 2017-04-18 LAB
ANION GAP SERPL CALCULATED.3IONS-SCNC: 7 MMOL/L (ref 3–14)
BUN SERPL-MCNC: 21 MG/DL (ref 7–30)
CALCIUM SERPL-MCNC: 8.8 MG/DL (ref 8.5–10.1)
CHLORIDE SERPL-SCNC: 106 MMOL/L (ref 94–109)
CO2 SERPL-SCNC: 27 MMOL/L (ref 20–32)
CREAT SERPL-MCNC: 1.01 MG/DL (ref 0.66–1.25)
GFR SERPL CREATININE-BSD FRML MDRD: 71 ML/MIN/1.7M2
GLUCOSE SERPL-MCNC: 103 MG/DL (ref 70–99)
POTASSIUM SERPL-SCNC: 4.4 MMOL/L (ref 3.4–5.3)
SODIUM SERPL-SCNC: 140 MMOL/L (ref 133–144)

## 2017-04-18 PROCEDURE — 36415 COLL VENOUS BLD VENIPUNCTURE: CPT | Performed by: PHYSICIAN ASSISTANT

## 2017-04-18 PROCEDURE — 99214 OFFICE O/P EST MOD 30 MIN: CPT | Mod: 24 | Performed by: PHYSICIAN ASSISTANT

## 2017-04-18 PROCEDURE — 80048 BASIC METABOLIC PNL TOTAL CA: CPT | Performed by: PHYSICIAN ASSISTANT

## 2017-04-18 RX ORDER — LISINOPRIL 10 MG/1
20 TABLET ORAL DAILY
COMMUNITY
Start: 2017-04-18 | End: 2024-01-07

## 2017-04-18 NOTE — PROGRESS NOTES
HPI and Plan:   See dictation  [id number]    Orders this Visit:  Orders Placed This Encounter   Procedures     Follow-Up with Cardiac Advanced Practice Provider     Orders Placed This Encounter   Medications     lisinopril (PRINIVIL/ZESTRIL) 10 MG tablet     Sig: Take 0.5 tablets (5 mg) by mouth daily     Medications Discontinued During This Encounter   Medication Reason     acetaminophen (TYLENOL) 325 MG tablet Stopped by Patient     lisinopril (PRINIVIL/ZESTRIL) 10 MG tablet Reorder         Encounter Diagnoses   Name Primary?     NSTEMI (non-ST elevated myocardial infarction) (H)      Essential hypertension      Paroxysmal atrial fibrillation (H) Yes       CURRENT MEDICATIONS:  Current Outpatient Prescriptions   Medication Sig Dispense Refill     lisinopril (PRINIVIL/ZESTRIL) 10 MG tablet Take 0.5 tablets (5 mg) by mouth daily       aspirin EC 81 MG EC tablet Take 1 tablet (81 mg) by mouth daily 120 tablet 11     LORazepam (ATIVAN) 0.5 MG tablet Take 1 tablet (0.5 mg) by mouth every 8 hours as needed for anxiety 21 tablet 0     atorvastatin (LIPITOR) 40 MG tablet Take 1 tablet (40 mg) by mouth daily 30 tablet 3     ticagrelor (BRILINTA) 90 MG tablet Take 1 tablet (90 mg) by mouth every 12 hours 60 tablet 11     nitroglycerin (NITROSTAT) 0.4 MG sublingual tablet For chest pain place 1 tablet under the tongue every 5 minutes for 3 doses. If symptoms persist 5 minutes after 1st dose call 911. 25 tablet 0     Biotin (BIOTIN MAXIMUM STRENGTH) 10 MG TABS tablet Take 10,000 mcg by mouth        Cranberry 300 MG TABS Take 300 mg by mouth        Multiple Vitamins-Minerals (CENTRUM SILVER) per tablet Take 1 tablet by mouth daily       HYDROcodone-acetaminophen (NORCO) 5-325 MG per tablet Take 1-2 tablets by mouth every 4 hours as needed for moderate to severe pain 20 tablet 0     Multiple Vitamins-Minerals (OCUVITE PO) Take 1 tablet by mouth        Metoprolol Tartrate (LOPRESSOR PO) Take 25 mg by mouth 2 times daily        [DISCONTINUED] lisinopril (PRINIVIL/ZESTRIL) 10 MG tablet Take 1 tablet (10 mg) by mouth daily 30 tablet 3       ALLERGIES  No Known Allergies    PAST MEDICAL HISTORY:  Past Medical History:   Diagnosis Date     Hypertension        PAST SURGICAL HISTORY:  Past Surgical History:   Procedure Laterality Date     BRONCHOSCOPY FLEXIBLE N/A 2/13/2015    Procedure: BRONCHOSCOPY FLEXIBLE;  Surgeon: Christo Gilbert MD;  Location: [facility] OR     ESOPHAGOSCOPY, GASTROSCOPY, DUODENOSCOPY (EGD), COMBINED N/A 2/10/2015    Procedure: COMBINED ESOPHAGOSCOPY, GASTROSCOPY, DUODENOSCOPY (EGD);  Surgeon: Christo Gilbert MD;  Location: [facility] OR     EYE SURGERY       HERNIA REPAIR       LAPAROSCOPIC ASSISTED INSERTION TUBE GASTROTOMY N/A 2/13/2015    Procedure: LAPAROSCOPIC ASSISTED INSERTION TUBE GASTROSTOMY;  Surgeon: Christo Gilbert MD;  Location: [facility]     LAPAROSCOPIC HERNIORRHAPHY GIANT PARAESOPHAGEAL N/A 2/13/2015    Procedure: LAPAROSCOPIC HERNIORRHAPHY GIANT PARAESOPHAGEAL;  Surgeon: Christo Gilbert MD;  Location: [facility]     LAPAROSCOPY DIAGNOSTIC (GENERAL) N/A 3/23/2017    Procedure: LAPAROSCOPY DIAGNOSTIC (GENERAL);  Surgeon: Ramón Garcia MD;  Location: [facility] OR       FAMILY HISTORY:  No family history on file.    SOCIAL HISTORY:  Social History     Social History     Marital status:      Spouse name: N/A     Number of children: N/A     Years of education: N/A     Social History Main Topics     Smoking status: Never Smoker     Smokeless tobacco: None     Alcohol use No     Drug use: No     Sexual activity: Not Asked     Other Topics Concern     None     Social History Narrative       Review of Systems:  Skin:  Negative     Eyes:  Positive for    ENT:  Positive for hearing loss  Respiratory:  Negative    Cardiovascular:    Positive for;fatigue  Gastroenterology: Negative    Genitourinary:  Positive for nocturia  Musculoskeletal:  Positive for joint pain  Neurologic:  Positive for  "numbness or tingling of feet  Psychiatric:  Positive for anxiety  Heme/Lymph/Imm:  Negative    Endocrine:  Negative        Physical Exam:  Vitals: BP 98/76 (BP Location: Right arm, Patient Position: Chair, Cuff Size: Adult Regular)  Pulse 80  Ht 1.778 m (5' 10\")  Wt 82 kg (180 lb 11.2 oz)  BMI 25.93 kg/m2    Constitutional:  cooperative;alert and oriented;in no acute distress   severely deconditioned    Skin:  warm and dry to the touch        Head:  normocephalic        Eyes:  pupils equal and round;sclera white        ENT:  no pallor or cyanosis        Neck:  JVP normal        Chest:  clear to auscultation        Cardiac: regular rhythm frequent premature beats           no significant murmurs appreciated    Abdomen:  abdomen soft;BS normoactive;non-tender        Vascular:                                   Right radial artery 1+ pulse, reverse smita test with slow filling. left radial artery 1+, reverse smita test normal. Right femoral artery healing moderate ecchymosis, small hematoma, no bruit.     Extremities and Back:  no edema   severely deconditioned     Neurological:  affect appropriate, oriented to time, person and place   difficulty walking, rides in wheel chair      Recent Lab Results:  LIPID RESULTS:  Lab Results   Component Value Date    CHOL 129 04/02/2017    HDL 44 04/02/2017    LDL 68 04/02/2017    TRIG 83 04/02/2017       LIVER ENZYME RESULTS:  Lab Results   Component Value Date    AST 21 03/23/2017    ALT 25 03/23/2017       CBC RESULTS:  Lab Results   Component Value Date    WBC 8.9 04/04/2017    RBC 4.21 (L) 04/04/2017    HGB 14.1 04/04/2017    HCT 40.9 04/04/2017    MCV 97 04/04/2017    MCH 33.5 (H) 04/04/2017    MCHC 34.5 04/04/2017    RDW 12.1 04/04/2017     04/04/2017       BMP RESULTS:  Lab Results   Component Value Date     04/18/2017    POTASSIUM 4.4 04/18/2017    CHLORIDE 106 04/18/2017    CO2 27 04/18/2017    ANIONGAP 7 04/18/2017     (H) 04/18/2017    BUN 21 " 04/18/2017    CR 1.01 04/18/2017    GFRESTIMATED 71 04/18/2017    GFRESTBLACK 86 04/18/2017    JOANN 8.8 04/18/2017        A1C RESULTS:  Lab Results   Component Value Date    A1C 5.4 02/14/2015       INR RESULTS:  Lab Results   Component Value Date    INR 1.03 02/13/2015    INR 1.10 02/12/2015           CC  [facility]  [id number]  [place] [id number]

## 2017-04-18 NOTE — LETTER
4/18/2017    Isaac Nunez DO  [facility]   [address]  [facility] [address]    RE: Pablo Fuentes       Dear Colleague,    I had the pleasure of seeing Pablo Fuentes in the [facility].    I had the pleasure of seeing Pablo Fuentes along with his wife today in the Cardiology Clinic following a recent hospital admission for an NSTEMI and coronary angiogram with stenting.      Pablo is a very pleasant 76-year-old man with a history of hypertension, hyperlipidemia, chronic knee pain, chronic back pain, recent admission for small bowel obstruction with lysis of adhesions.  He was admitted at [facility] on 04/02/2017 with a prolonged episode of chest pain with associated emesis.  Troponin was elevated and peaked at 13.  There were some ST abnormalities in the precordial leads.  An echocardiogram was performed that showed LVEF of 60% with basal, lateral hypokinesis.  There is grade I or early diastolic dysfunction.  There is mild to moderate LVH.  No significant valve disease was detected.  Due to the findings, he underwent cardiac catheterization on 04/03/2017.  He underwent a drug-eluting stent to the mid-circumflex due to a subtotal occlusion.  There was a remaining 75% small second diagonal, moderate disease in the mid-LAD and approximately a 70% proximal RCA lesion that underwent FFR testing that was negative.  He went into paroxysmal atrial fibrillation with RVR.  He was initially started on Eliquis but he spontaneously converted.  Given it was just the one episode anticoagulation was discontinued.  Given he was asymptomatic with the atrial fibrillation he was sent out with a Zio Patch monitor for 14 days.        His blood pressure meds previously consisted of amlodipine and metoprolol; however, in the setting of an NSTEMI and coronary artery disease, this was switched to lisinopril and metoprolol.      Pablo presents today for followup.  He  tells me that he has had no further chest pain.  He has no shortness of breath.  He has no edema.  He continues to watch his diet and tries to eat a heart-healthy diet, avoiding cholesterol and sodium.  He has chronic knee pain and back pain.  He has been more fatigued recently and his blood pressures have been running lower with systolics in the 90s.  He denies any significant palpitations though as noted above he had no significant palpitations or AFib in the hospital.  His ecchymosis is healing at all of the access sites.      PHYSICAL EXAMINATION:   VITAL SIGNS:  Blood pressure 98/76, pulse 80, weight 180 pounds 11.2 ounces, BMI 25.98.   GENERAL:  Elderly male, severely deconditioned, in no apparent distress.   LUNGS:  Clear.   CARDIAC:  Regular with frequent ectopy, S1 and S2, no significant murmurs appreciated, no JVD.   ABDOMEN:  Soft, bowel sounds positive, nontender.   EXTREMITIES:  Warm without pitting edema.  The right radial arteriotomy site and the left radial arteriotomy site had 1+ radial pulses.  The reverse Krishan test showed patency of both radial arteries.  The right femoral arteriotomy site showed moderate healing ecchymosis.  There was a small hematoma.  There was no bruit appreciated.   NEUROLOGIC:  Alert and oriented x3.  He walks with a walker.      DIAGNOSTICS 04/03/2017.  Echocardiogram shows LV is normal in size.  EF 60%.  Mild basal lateral hypokinesis.  Mild to moderate concentric LVH.  RV is normal in structure, function and size.  No significant valve disease.  There was also noted grade 1 early diastolic dysfunction.      On 04/03/2017, coronary angiogram showed subtotal mid circumflex status post drug-eluting stent placement.  There was a small LAD with moderate disease in the mid vessel and 70%-75% second diagonal.  Her right coronary artery was dominant with a 60%-70% proximal lesion.  The FFR of the RCA was negative at 0.93.      On 04/18/2017, basic metabolic panel showed sodium  140, potassium 4.4, BUN 21, creatinine 1.01, GFR 71.     Outpatient Encounter Prescriptions as of 4/18/2017   Medication Sig Dispense Refill     lisinopril (PRINIVIL/ZESTRIL) 10 MG tablet Take 0.5 tablets (5 mg) by mouth daily       aspirin EC 81 MG EC tablet Take 1 tablet (81 mg) by mouth daily 120 tablet 11     LORazepam (ATIVAN) 0.5 MG tablet Take 1 tablet (0.5 mg) by mouth every 8 hours as needed for anxiety 21 tablet 0     atorvastatin (LIPITOR) 40 MG tablet Take 1 tablet (40 mg) by mouth daily 30 tablet 3     ticagrelor (BRILINTA) 90 MG tablet Take 1 tablet (90 mg) by mouth every 12 hours 60 tablet 11     nitroglycerin (NITROSTAT) 0.4 MG sublingual tablet For chest pain place 1 tablet under the tongue every 5 minutes for 3 doses. If symptoms persist 5 minutes after 1st dose call 911. 25 tablet 0     Biotin (BIOTIN MAXIMUM STRENGTH) 10 MG TABS tablet Take 10,000 mcg by mouth        Cranberry 300 MG TABS Take 300 mg by mouth        Multiple Vitamins-Minerals (CENTRUM SILVER) per tablet Take 1 tablet by mouth daily       HYDROcodone-acetaminophen (NORCO) 5-325 MG per tablet Take 1-2 tablets by mouth every 4 hours as needed for moderate to severe pain 20 tablet 0     Multiple Vitamins-Minerals (OCUVITE PO) Take 1 tablet by mouth        Metoprolol Tartrate (LOPRESSOR PO) Take 25 mg by mouth 2 times daily       [DISCONTINUED] lisinopril (PRINIVIL/ZESTRIL) 10 MG tablet Take 1 tablet (10 mg) by mouth daily 30 tablet 3     [DISCONTINUED] acetaminophen (TYLENOL) 325 MG tablet Take 1-2 tablets (325-650 mg) by mouth every 4 hours as needed for mild pain or headaches 100 tablet      No facility-administered encounter medications on file as of 4/18/2017.       ASSESSMENT:   1.  Non-ST segment myocardial infarction.   2.  Coronary artery disease    a.  On 04/03/2017 coronary angiogram status post drug-eluting stent to the mid circ for subtotal occlusion.  There is remaining moderate disease in the small LAD and a 75%  stenosis in a second diagonal.  There was a 70% proximal RCA with a negative FFR.   3.  Paroxysmal AFib    a.  Spontaneously converted    b.  Asymptomatic    c.  Initially started on Eliquis but discontinued due to the one short episode.  CHADS2-VASc score is at least 4 (age 1, CAD, hypertension)    d.  Wearing a Zio Patch.   4.  Dyslipidemia.   5.  Hypertension, blood pressure currently running low normal.      PLAN:   1.  Pablo seems to be doing well after his admission for an MI and stenting.  He has no chest pain, significant palpitations or evidence of heart failure on exam.   2.  Continue dual antiplatelet therapy, which he seems to be tolerating.   3.  His blood pressures have been running on the lower end of normal with some fatigue.  We will decrease his lisinopril from 10 mg to 5 mg daily.  He will continue his metoprolol.   4.  Continue statin.  When he sees Dr. Benites in June he will have a repeat fasting lipid panel and ALT.   5.  He will wear the Zio Patch monitor for 2 more days.  We will follow up with him in 2 weeks to review the results.  Should there be recurrent paroxysmal atrial fibrillation we need to consider long-term anticoagulation given his CHADS2-VASc score of at least 4.  He does remain on metoprolol.   6.  Cardiac rehab.  As noted, he is severely deconditioned and needs benefit from continued rehabilitation.   7.  He will continue trying to follow a heart-healthy diet.   8.  Follow up with me in 2 weeks regarding his Zio Patch monitor.  Follow up with Dr. Benites on 06/09/2017 with a fasting lipid panel, ALT and BMP.      Thank you for allowing me to participate in the care of Pablo Fuentes.     Sincerely,    Lily Jacobo PA-C     [facility]

## 2017-04-18 NOTE — MR AVS SNAPSHOT
After Visit Summary   4/18/2017    Pablo Fuentes    MRN: [id number]           Patient Information     Date Of Birth          1940        Visit Information        Provider Department      4/18/2017 1:50 PM Lily Jacobo PA-C [facility] [place]        Today's Diagnoses     Paroxysmal atrial fibrillation (H)    -  1    NSTEMI (non-ST elevated myocardial infarction) (H)        Essential hypertension          Care Instructions    Due to your blood pressure running lower, I am decreasing the lisinopril to 5 mg (half a tab) once a day.   Otherwise continue your current medications.   Continue to work on a heart healthy diet, limiting cholesterol and sodium.   Continue cardiac rehab.   Follow up in 2 weeks to review the monitor results.         Follow-ups after your visit        Additional Services     Follow-Up with Cardiac Advanced Practice Provider                 Your next 10 appointments already scheduled     Apr 20, 2017  1:00 PM CDT   Treatment 60 with Rh Cardiac Rehab 3   [facility] ([facility])    [address]  [facility] [address]   [phone]            Apr 25, 2017  9:30 AM CDT   Treatment 60 with Rh Cardiac Rehab 2   [facility] ([facility])    [address]  [place] [address]   [phone]            May 02, 2017  9:30 AM CDT   Treatment 60 with Rh Cardiac Rehab 2   [facility] ([facility])    [address]  [place] [address]   [phone]            May 04, 2017  9:30 AM CDT   Treatment 60 with Rh Cardiac Rehab 2   [facility] ([facility])    [address]  [place] [address]   [phone]            May 05, 2017 10:00 AM CDT   Treatment 60 with Rh Cardiac Rehab 1   [facility] ([facility])    [address]  [facility] [address]   [phone]            May 09, 2017  8:50 AM CDT   Return Visit with Lily Jacobo PA-C   [facility] [place] ([facility])    [address]  [place] [address]   [phone]            May 09, 2017  9:30 AM CDT   Treatment 60 with Rh Cardiac Rehab 2   [facility] ([facility])    [address]  [place] [address]   [phone]            May 11, 2017  9:30 AM CDT   Treatment 60 with Rh Cardiac Rehab 2   [facility] ([facility])    [address]  [place] [address]   [phone]            May 12, 2017 10:00 AM CDT   Treatment 60 with Rh Cardiac Rehab 1   [facility] ([facility])    [address]  [facility] [address]   [phone]            May 15, 2017 10:00 AM CDT   Treatment 60 with Rh Cardiac Rehab 1   [facility] ([facility])    [address]  [facility] [address]   [phone]              Future tests that were ordered for you today     Open Future Orders        Priority Expected Expires Ordered    Follow-Up with Cardiac Advanced Practice Provider Routine 5/4/2017 4/18/2018 4/18/2017            Who to contact     If you have questions or need follow up information about today's clinic visit or your schedule please contact [facility] [place] directly at [phone].  Normal or non-critical lab and imaging results will be communicated to you by MyChart, letter or phone within 4 business days after the clinic has received the results. If you do not hear from us within 7 days, please contact the clinic through MyChart or phone. If you have a critical or abnormal lab result, we will notify you by phone as soon as possible.  Submit refill requests  "through [facility] [facility] or call your pharmacy and they will forward the refill request to us. Please allow 3 business days for your refill to be completed.          Additional Information About Your Visit        [employer] Information     [employer] lets you send messages to your doctor, view your test results, renew your prescriptions, schedule appointments and more. To sign up, go to [email or web address].[email or web address]"[email or web address] . Click on \"Log in\" on the left side of the screen, which will take you to the Welcome page. Then click on \"Sign up Now\" on the right side of the page.     You will be asked to enter the access code listed below, as well as some personal information. Please follow the directions to create your username and password.     Your access code is: [email or web address]  Expires: 2017 10:43 AM     Your access code will  in 90 days. If you need help or a new code, please call your [place] clinic or [phone].        Care EveryWhere ID     This is your Care EveryWhere ID. This could be used by other organizations to access your [place] medical records  [id number]        Your Vitals Were     Pulse Height BMI (Body Mass Index)             80 1.778 m (5' 10\") 25.93 kg/m2          Blood Pressure from Last 3 Encounters:   17 98/76   17 107/69   17 (!) 137/96    Weight from Last 3 Encounters:   17 82 kg (180 lb 11.2 oz)   17 81.8 kg (180 lb 6.4 oz)   17 76.5 kg (168 lb 9.6 oz)              We Performed the Following     Follow-Up with Cardiac Advanced Practice Provider          Today's Medication Changes          These changes are accurate as of: 17  2:44 PM.  If you have any questions, ask your nurse or doctor.               These medicines have changed or have updated prescriptions.        Dose/Directions    lisinopril 10 MG tablet   Commonly known as:  PRINIVIL/ZESTRIL   This may have changed:  how much to take   Used for:  NSTEMI (non-ST elevated myocardial infarction) (H), Essential " hypertension   Changed by:  Lily Jacobo PA-C        Dose:  5 mg   Take 0.5 tablets (5 mg) by mouth daily   Refills:  0                Primary Care Provider Office Phone # Fax #    Isaac Nunez [phone]       [facility] [address]  [facility] [address]        Thank you!     Thank you for choosing [facility] [place]  for your care. Our goal is always to provide you with excellent care. Hearing back from our patients is one way we can continue to improve our services. Please take a few minutes to complete the written survey that you may receive in the mail after your visit with us. Thank you!             Your Updated Medication List - Protect others around you: Learn how to safely use, store and throw away your medicines at www.disposemymeds.org.          This list is accurate as of: 4/18/17  2:44 PM.  Always use your most recent med list.                   Brand Name Dispense Instructions for use    aspirin 81 MG EC tablet     120 tablet    Take 1 tablet (81 mg) by mouth daily       atorvastatin 40 MG tablet    LIPITOR    30 tablet    Take 1 tablet (40 mg) by mouth daily       BIOTIN MAXIMUM STRENGTH 10 MG Tabs tablet   Generic drug:  Biotin      Take 10,000 mcg by mouth       Cranberry 300 MG Tabs      Take 300 mg by mouth       HYDROcodone-acetaminophen 5-325 MG per tablet    NORCO    20 tablet    Take 1-2 tablets by mouth every 4 hours as needed for moderate to severe pain       lisinopril 10 MG tablet    PRINIVIL/ZESTRIL     Take 0.5 tablets (5 mg) by mouth daily       LOPRESSOR PO      Take 25 mg by mouth 2 times daily       LORazepam 0.5 MG tablet    ATIVAN    21 tablet    Take 1 tablet (0.5 mg) by mouth every 8 hours as needed for anxiety       nitroglycerin 0.4 MG sublingual tablet    NITROSTAT    25 tablet    For chest pain place 1 tablet under the tongue every 5 minutes for 3 doses. If symptoms persist 5 minutes  after 1st dose call 911.       * OCUVITE PO      Take 1 tablet by mouth       * CENTRUM SILVER per tablet      Take 1 tablet by mouth daily       ticagrelor 90 MG tablet    BRILINTA    60 tablet    Take 1 tablet (90 mg) by mouth every 12 hours       * Notice:  This list has 2 medication(s) that are the same as other medications prescribed for you. Read the directions carefully, and ask your doctor or other care provider to review them with you.

## 2017-04-19 NOTE — PROGRESS NOTES
HISTORY OF PRESENT ILLNESS:  I had the pleasure of seeing Pablo Fuentes along with his wife today in the Cardiology Clinic following a recent hospital admission for an NSTEMI and coronary angiogram with stenting.      Pablo is a very pleasant 76-year-old man with a history of hypertension, hyperlipidemia, chronic knee pain, chronic back pain, recent admission for small bowel obstruction with lysis of adhesions.  He was admitted at [facility] on 04/02/2017 with a prolonged episode of chest pain with associated emesis.  Troponin was elevated and peaked at 13.  There were some ST abnormalities in the precordial leads.  An echocardiogram was performed that showed LVEF of 60% with basal, lateral hypokinesis.  There is grade I or early diastolic dysfunction.  There is mild to moderate LVH.  No significant valve disease was detected.  Due to the findings, he underwent cardiac catheterization on 04/03/2017.  He underwent a drug-eluting stent to the mid-circumflex due to a subtotal occlusion.  There was a remaining 75% small second diagonal, moderate disease in the mid-LAD and approximately a 70% proximal RCA lesion that underwent FFR testing that was negative.  He went into paroxysmal atrial fibrillation with RVR.  He was initially started on Eliquis but he spontaneously converted.  Given it was just the one episode anticoagulation was discontinued.  Given he was asymptomatic with the atrial fibrillation he was sent out with a Zio Patch monitor for 14 days.        His blood pressure meds previously consisted of amlodipine and metoprolol; however, in the setting of an NSTEMI and coronary artery disease, this was switched to lisinopril and metoprolol.      Pablo presents today for followup.  He tells me that he has had no further chest pain.  He has no shortness of breath.  He has no edema.  He continues to watch his diet and tries to eat a heart-healthy diet, avoiding cholesterol and sodium.  He has chronic knee  pain and back pain.  He has been more fatigued recently and his blood pressures have been running lower with systolics in the 90s.  He denies any significant palpitations though as noted above he had no significant palpitations or AFib in the hospital.  His ecchymosis is healing at all of the access sites.      PHYSICAL EXAMINATION:   VITAL SIGNS:  Blood pressure 98/76, pulse 80, weight 180 pounds 11.2 ounces, BMI 25.98.   GENERAL:  Elderly male, severely deconditioned, in no apparent distress.   LUNGS:  Clear.   CARDIAC:  Regular with frequent ectopy, S1 and S2, no significant murmurs appreciated, no JVD.   ABDOMEN:  Soft, bowel sounds positive, nontender.   EXTREMITIES:  Warm without pitting edema.  The right radial arteriotomy site and the left radial arteriotomy site had 1+ radial pulses.  The reverse Krishan test showed patency of both radial arteries.  The right femoral arteriotomy site showed moderate healing ecchymosis.  There was a small hematoma.  There was no bruit appreciated.   NEUROLOGIC:  Alert and oriented x3.  He walks with a walker.      DIAGNOSTICS 04/03/2017.  Echocardiogram shows LV is normal in size.  EF 60%.  Mild basal lateral hypokinesis.  Mild to moderate concentric LVH.  RV is normal in structure, function and size.  No significant valve disease.  There was also noted grade 1 early diastolic dysfunction.      On 04/03/2017, coronary angiogram showed subtotal mid circumflex status post drug-eluting stent placement.  There was a small LAD with moderate disease in the mid vessel and 70%-75% second diagonal.  Her right coronary artery was dominant with a 60%-70% proximal lesion.  The FFR of the RCA was negative at 0.93.      On 04/18/2017, basic metabolic panel showed sodium 140, potassium 4.4, BUN 21, creatinine 1.01, GFR 71.      ASSESSMENT:   1.  Non-ST segment myocardial infarction.   2.  Coronary artery disease    a.  On 04/03/2017 coronary angiogram status post drug-eluting stent to the  mid circ for subtotal occlusion.  There is remaining moderate disease in the small LAD and a 75% stenosis in a second diagonal.  There was a 70% proximal RCA with a negative FFR.   3.  Paroxysmal AFib    a.  Spontaneously converted    b.  Asymptomatic    c.  Initially started on Eliquis but discontinued due to the one short episode.  CHADS2-VASc score is at least 4 (age 1, CAD, hypertension)    d.  Wearing a Zio Patch.   4.  Dyslipidemia.   5.  Hypertension, blood pressure currently running low normal.      PLAN:   1.  Luh seems to be doing well after his admission for an MI and stenting.  He has no chest pain, significant palpitations or evidence of heart failure on exam.   2.  Continue dual antiplatelet therapy, which he seems to be tolerating.   3.  His blood pressures have been running on the lower end of normal with some fatigue.  We will decrease his lisinopril from 10 mg to 5 mg daily.  He will continue his metoprolol.   4.  Continue statin.  When he sees Dr. Benites in  he will have a repeat fasting lipid panel and ALT.   5.  He will wear the Zio Patch monitor for 2 more days.  We will follow up with him in 2 weeks to review the results.  Should there be recurrent paroxysmal atrial fibrillation we need to consider long-term anticoagulation given his CHADS2-VASc score of at least 4.  He does remain on metoprolol.   6.  Cardiac rehab.  As noted, he is severely deconditioned and needs benefit from continued rehabilitation.   7.  He will continue trying to follow a heart-healthy diet.   8.  Follow up with me in 2 weeks regarding his Zio Patch monitor.  Follow up with Dr. Benites on 2017 with a fasting lipid panel, ALT and BMP.      Thank you for allowing me to participate in the care of Luh Zelaya.      DELVIN Lechuga PA-C             D: 2017 14:47   T: 2017 15:53   MT: LOGAN      Name:     LUH ZELAYA   MRN:      [id number]        Account:      [id number]   :       1940           Service Date: 04/18/2017      Document: [id number]

## 2017-04-20 ENCOUNTER — HOSPITAL ENCOUNTER (OUTPATIENT)
Dept: CARDIAC REHAB | Facility: CLINIC | Age: 77
End: 2017-04-20
Attending: INTERNAL MEDICINE
Payer: MEDICARE

## 2017-04-20 PROCEDURE — 40000116 ZZH STATISTIC OP CR VISIT: Performed by: OCCUPATIONAL THERAPIST

## 2017-04-20 PROCEDURE — 93798 PHYS/QHP OP CAR RHAB W/ECG: CPT | Performed by: OCCUPATIONAL THERAPIST

## 2017-04-25 ENCOUNTER — HOSPITAL ENCOUNTER (OUTPATIENT)
Dept: CARDIAC REHAB | Facility: CLINIC | Age: 77
End: 2017-04-25
Attending: INTERNAL MEDICINE
Payer: MEDICARE

## 2017-04-25 PROCEDURE — 93798 PHYS/QHP OP CAR RHAB W/ECG: CPT | Performed by: REHABILITATION PRACTITIONER

## 2017-04-25 PROCEDURE — 40000116 ZZH STATISTIC OP CR VISIT: Performed by: REHABILITATION PRACTITIONER

## 2017-05-02 ENCOUNTER — HOSPITAL ENCOUNTER (OUTPATIENT)
Dept: CARDIAC REHAB | Facility: CLINIC | Age: 77
End: 2017-05-02
Attending: INTERNAL MEDICINE
Payer: MEDICARE

## 2017-05-02 PROCEDURE — 93798 PHYS/QHP OP CAR RHAB W/ECG: CPT | Performed by: REHABILITATION PRACTITIONER

## 2017-05-02 PROCEDURE — 40000116 ZZH STATISTIC OP CR VISIT: Performed by: REHABILITATION PRACTITIONER

## 2017-05-04 ENCOUNTER — HOSPITAL ENCOUNTER (OUTPATIENT)
Dept: CARDIAC REHAB | Facility: CLINIC | Age: 77
End: 2017-05-04
Attending: INTERNAL MEDICINE
Payer: MEDICARE

## 2017-05-04 VITALS — WEIGHT: 179.8 LBS | HEIGHT: 70 IN | BODY MASS INDEX: 25.74 KG/M2

## 2017-05-04 PROCEDURE — 93798 PHYS/QHP OP CAR RHAB W/ECG: CPT | Performed by: REHABILITATION PRACTITIONER

## 2017-05-04 PROCEDURE — 40000116 ZZH STATISTIC OP CR VISIT: Performed by: REHABILITATION PRACTITIONER

## 2017-05-04 ASSESSMENT — 6 MINUTE WALK TEST (6MWT)
TOTAL DISTANCE WALKED (FT): 145
FEMALE CALC: 1325.65
GENDER SELECTION: MALE
MALE CALC: 1645.07
PREDICTED: 1655.1
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
## 2017-05-05 ENCOUNTER — HOSPITAL ENCOUNTER (OUTPATIENT)
Dept: CARDIAC REHAB | Facility: CLINIC | Age: 77
End: 2017-05-05
Attending: INTERNAL MEDICINE
Payer: MEDICARE

## 2017-05-05 PROCEDURE — 40000116 ZZH STATISTIC OP CR VISIT: Performed by: REHABILITATION PRACTITIONER

## 2017-05-05 PROCEDURE — 93798 PHYS/QHP OP CAR RHAB W/ECG: CPT | Performed by: REHABILITATION PRACTITIONER

## 2017-05-09 ENCOUNTER — HOSPITAL ENCOUNTER (OUTPATIENT)
Dept: CARDIAC REHAB | Facility: CLINIC | Age: 77
End: 2017-05-09
Attending: INTERNAL MEDICINE
Payer: MEDICARE

## 2017-05-09 PROCEDURE — 40000116 ZZH STATISTIC OP CR VISIT: Performed by: OCCUPATIONAL THERAPIST

## 2017-05-09 PROCEDURE — 93798 PHYS/QHP OP CAR RHAB W/ECG: CPT | Performed by: OCCUPATIONAL THERAPIST

## 2017-05-10 ENCOUNTER — OFFICE VISIT (OUTPATIENT)
Dept: CARDIOLOGY | Facility: CLINIC | Age: 77
End: 2017-05-10
Attending: PHYSICIAN ASSISTANT
Payer: MEDICARE

## 2017-05-10 VITALS
HEIGHT: 70 IN | DIASTOLIC BLOOD PRESSURE: 72 MMHG | HEART RATE: 76 BPM | SYSTOLIC BLOOD PRESSURE: 114 MMHG | WEIGHT: 177.5 LBS | BODY MASS INDEX: 25.41 KG/M2

## 2017-05-10 DIAGNOSIS — I48.0 PAROXYSMAL ATRIAL FIBRILLATION (H): ICD-10-CM

## 2017-05-10 PROCEDURE — 99213 OFFICE O/P EST LOW 20 MIN: CPT | Mod: 24 | Performed by: PHYSICIAN ASSISTANT

## 2017-05-10 NOTE — PROGRESS NOTES
HPI and Plan:   See dictation  [id number]    Orders this Visit:  No orders of the defined types were placed in this encounter.    No orders of the defined types were placed in this encounter.    There are no discontinued medications.      Encounter Diagnosis   Name Primary?     Paroxysmal atrial fibrillation (H)        CURRENT MEDICATIONS:  Current Outpatient Prescriptions   Medication Sig Dispense Refill     lisinopril (PRINIVIL/ZESTRIL) 10 MG tablet Take 0.5 tablets (5 mg) by mouth daily       aspirin EC 81 MG EC tablet Take 1 tablet (81 mg) by mouth daily 120 tablet 11     LORazepam (ATIVAN) 0.5 MG tablet Take 1 tablet (0.5 mg) by mouth every 8 hours as needed for anxiety 21 tablet 0     atorvastatin (LIPITOR) 40 MG tablet Take 1 tablet (40 mg) by mouth daily 30 tablet 3     ticagrelor (BRILINTA) 90 MG tablet Take 1 tablet (90 mg) by mouth every 12 hours 60 tablet 11     nitroglycerin (NITROSTAT) 0.4 MG sublingual tablet For chest pain place 1 tablet under the tongue every 5 minutes for 3 doses. If symptoms persist 5 minutes after 1st dose call 911. 25 tablet 0     Biotin (BIOTIN MAXIMUM STRENGTH) 10 MG TABS tablet Take 10,000 mcg by mouth        Cranberry 300 MG TABS Take 300 mg by mouth        Multiple Vitamins-Minerals (CENTRUM SILVER) per tablet Take 1 tablet by mouth daily       HYDROcodone-acetaminophen (NORCO) 5-325 MG per tablet Take 1-2 tablets by mouth every 4 hours as needed for moderate to severe pain 20 tablet 0     Multiple Vitamins-Minerals (OCUVITE PO) Take 1 tablet by mouth        Metoprolol Tartrate (LOPRESSOR PO) Take 25 mg by mouth 2 times daily         ALLERGIES  No Known Allergies    PAST MEDICAL HISTORY:  Past Medical History:   Diagnosis Date     Hypertension        PAST SURGICAL HISTORY:  Past Surgical History:   Procedure Laterality Date     BRONCHOSCOPY FLEXIBLE N/A 2/13/2015    Procedure: BRONCHOSCOPY FLEXIBLE;  Surgeon: Christo Gilbert MD;  Location: [facility] OR     ESOPHAGOSCOPY,  "GASTROSCOPY, DUODENOSCOPY (EGD), COMBINED N/A 2/10/2015    Procedure: COMBINED ESOPHAGOSCOPY, GASTROSCOPY, DUODENOSCOPY (EGD);  Surgeon: Christo Gilbert MD;  Location: [facility] OR     EYE SURGERY       HERNIA REPAIR       LAPAROSCOPIC ASSISTED INSERTION TUBE GASTROTOMY N/A 2/13/2015    Procedure: LAPAROSCOPIC ASSISTED INSERTION TUBE GASTROSTOMY;  Surgeon: Christo Gilbert MD;  Location: [facility] OR     LAPAROSCOPIC HERNIORRHAPHY GIANT PARAESOPHAGEAL N/A 2/13/2015    Procedure: LAPAROSCOPIC HERNIORRHAPHY GIANT PARAESOPHAGEAL;  Surgeon: Christo Gilbert MD;  Location: [facility] OR     LAPAROSCOPY DIAGNOSTIC (GENERAL) N/A 3/23/2017    Procedure: LAPAROSCOPY DIAGNOSTIC (GENERAL);  Surgeon: Ramón Garcia MD;  Location: [facility] OR       FAMILY HISTORY:  No family history on file.    SOCIAL HISTORY:  Social History     Social History     Marital status:      Spouse name: N/A     Number of children: N/A     Years of education: N/A     Social History Main Topics     Smoking status: Never Smoker     Smokeless tobacco: None     Alcohol use No     Drug use: No     Sexual activity: Not Asked     Other Topics Concern     None     Social History Narrative       Review of Systems:  Skin:  Negative     Eyes:  Positive for    ENT:  Positive for hearing loss  Respiratory:  Positive for cough  Cardiovascular:    Positive for;lightheadedness  Gastroenterology: Negative    Genitourinary:  Positive for nocturia  Musculoskeletal:  Positive for joint pain  Neurologic:  Positive for numbness or tingling of feet  Psychiatric:  Positive for anxiety  Heme/Lymph/Imm:  Positive for easy bruising  Endocrine:  Negative        Physical Exam:  Vitals: /72 (BP Location: Right arm, Patient Position: Chair, Cuff Size: Adult Regular)  Pulse 76  Ht 1.778 m (5' 10\")  Wt 80.5 kg (177 lb 8 oz)  BMI 25.47 kg/m2    Constitutional:  cooperative;alert and oriented;in no acute distress   severely deconditioned, becomes quite anxious with " discussion of afib    Skin:  warm and dry to the touch   small areas of ecchymosis     Head:  normocephalic        Eyes:  pupils equal and round;sclera white        ENT:  no pallor or cyanosis        Neck:  not assessed this visit        Chest:  clear to auscultation        Cardiac: regular rhythm frequent premature beats           no significant murmurs appreciated    Abdomen:  abdomen soft;BS normoactive;non-tender        Vascular: not assessed this visit                                      Extremities and Back:  no edema   severely deconditioned     Neurological:  affect appropriate, oriented to time, person and place   difficulty walking, rides in wheel chair      Recent Lab Results:  LIPID RESULTS:  Lab Results   Component Value Date    CHOL 129 04/02/2017    HDL 44 04/02/2017    LDL 68 04/02/2017    TRIG 83 04/02/2017       LIVER ENZYME RESULTS:  Lab Results   Component Value Date    AST 21 03/23/2017    ALT 25 03/23/2017       CBC RESULTS:  Lab Results   Component Value Date    WBC 8.9 04/04/2017    RBC 4.21 (L) 04/04/2017    HGB 14.1 04/04/2017    HCT 40.9 04/04/2017    MCV 97 04/04/2017    MCH 33.5 (H) 04/04/2017    MCHC 34.5 04/04/2017    RDW 12.1 04/04/2017     04/04/2017       BMP RESULTS:  Lab Results   Component Value Date     04/18/2017    POTASSIUM 4.4 04/18/2017    CHLORIDE 106 04/18/2017    CO2 27 04/18/2017    ANIONGAP 7 04/18/2017     (H) 04/18/2017    BUN 21 04/18/2017    CR 1.01 04/18/2017    GFRESTIMATED 71 04/18/2017    GFRESTBLACK 86 04/18/2017    JOANN 8.8 04/18/2017        A1C RESULTS:  Lab Results   Component Value Date    A1C 5.4 02/14/2015       INR RESULTS:  Lab Results   Component Value Date    INR 1.03 02/13/2015    INR 1.10 02/12/2015           CC  Lily Jacobo PA-C   [facility]  [id number] [place] [address]

## 2017-05-10 NOTE — MR AVS SNAPSHOT
After Visit Summary   5/10/2017    Pablo Fuentes    MRN: [id number]           Patient Information     Date Of Birth          1940        Visit Information        Provider Department      5/10/2017 10:10 AM Lily Jacobo PA-C [facility] [place]        Today's Diagnoses     Paroxysmal atrial fibrillation (H)          Care Instructions    The monitor shows you have episodes of afib that come and go.   Given your risk of developing a clot that could cause a stroke 4.8% annual risk, ideally you should be on a blood thinner. Right now, you are on aspirin and Brilinta for the stents.     If we put you on a blood thinner, we would need to change the stent medications just a bit to decrease bleeding risk.     Per your request, discuss with Dr. Nunez 5/25/17, and then discuss with Dr. Benites on 6/9/17.     I would also discuss with Dr. Nunez your need for Ativan - possibly there is something you can use daily to better control your need of the Ativan.             Follow-ups after your visit        Your next 10 appointments already scheduled     May 11, 2017  9:30 AM CDT   Treatment 60 with Rh Cardiac Rehab 2   [facility] ([facility])    [address] [place] [address], [address]  [facility] [address]   [phone]            May 12, 2017 10:00 AM CDT   Treatment 60 with Rh Cardiac Rehab 1   [facility] ([facility])    [address] [place] [address], [address]  [facility] [address]   [phone]            May 15, 2017 10:00 AM CDT   Treatment 60 with Rh Cardiac Rehab 1   [facility] ([facility])    [address] [place] [address], [address]  [facility] [address]   [phone]            May 17, 2017 10:00 AM CDT   Treatment 60 with Rh Cardiac Rehab 1   [facility] ([facility])    [address], [address]  [facility] [address]   [phone]             May 19, 2017  9:30 AM CDT   Consult HOD with Rh Cardiac Rehab 1   [facility] ([facility])    [address], [address]  [facility] [address]   [phone]            May 19, 2017 10:00 AM CDT   Treatment 60 with Rh Cardiac Rehab 1   [facility] ([facility])    [address], [address]  [facility] [address]   [phone]            May 22, 2017 10:00 AM CDT   Treatment 60 with Rh Cardiac Rehab 1   [facility] ([facility])    [address], [address]  [facility] [address]   [phone]            May 24, 2017 10:00 AM CDT   Treatment 60 with Rh Cardiac Rehab 1   [facility] ([facility])    [address], [address]  [facility] [address]   [phone]            May 26, 2017 10:00 AM CDT   Treatment 60 with Rh Cardiac Rehab 1   [facility] ([facility])    [address], [address]  [facility] [address]   [phone]            May 31, 2017 10:00 AM CDT   Treatment 60 with Rh Cardiac Rehab 1   [facility] ([facility])    [address], [address]  [facility] [address]   [phone]              Who to contact     If you have questions or need follow up information about today's clinic visit or your schedule please contact [facility] [place] directly at [phone].  Normal or non-critical lab and imaging results will be communicated to you by MyChart, letter or phone within 4 business days after the clinic has received the results. If you do not hear from us within 7 days, please contact the clinic through MyChart or phone. If you have a critical or abnormal lab result, we will notify you by phone as soon as possible.  Submit refill requests through [employer] or call your pharmacy and they will forward the refill request to us. Please  "allow 3 business days for your refill to be completed.          Additional Information About Your Visit        MyChart Information     [employer] lets you send messages to your doctor, view your test results, renew your prescriptions, schedule appointments and more. To sign up, go to [email or web address] . Click on \"Log in\" on the left side of the screen, which will take you to the Welcome page. Then click on \"Sign up Now\" on the right side of the page.     You will be asked to enter the access code listed below, as well as some personal information. Please follow the directions to create your username and password.     Your access code is: [email or web address]  Expires: 2017 10:43 AM     Your access code will  in 90 days. If you need help or a new code, please call your [facility] clinic or [phone].        Care EveryWhere ID     This is your Care EveryWhere ID. This could be used by other organizations to access your [facility] medical records  [id number]        Your Vitals Were     Pulse Height BMI (Body Mass Index)             76 1.778 m (5' 10\") 25.47 kg/m2          Blood Pressure from Last 3 Encounters:   05/10/17 114/72   17 98/76   17 107/69    Weight from Last 3 Encounters:   05/10/17 80.5 kg (177 lb 8 oz)   17 81.6 kg (179 lb 12.8 oz)   17 82 kg (180 lb 11.2 oz)              We Performed the Following     Follow-Up with Cardiac Advanced Practice Provider        Primary Care Provider Office Phone # Fax #    sIaac CALDWELL Amadounoe  [phone]       [facility] [address]  [facility] [address]        Thank you!     Thank you for choosing [facility] [place]  for your care. Our goal is always to provide you with excellent care. Hearing back from our patients is one way we can continue to improve our services. Please take a few minutes to complete the written survey that you may receive in the mail after your " visit with us. Thank you!             Your Updated Medication List - Protect others around you: Learn how to safely use, store and throw away your medicines at www.disposemymeds.org.          This list is accurate as of: 5/10/17 10:51 AM.  Always use your most recent med list.                   Brand Name Dispense Instructions for use    aspirin 81 MG EC tablet     120 tablet    Take 1 tablet (81 mg) by mouth daily       atorvastatin 40 MG tablet    LIPITOR    30 tablet    Take 1 tablet (40 mg) by mouth daily       BIOTIN MAXIMUM STRENGTH 10 MG Tabs tablet   Generic drug:  Biotin      Take 10,000 mcg by mouth       Cranberry 300 MG Tabs      Take 300 mg by mouth       HYDROcodone-acetaminophen 5-325 MG per tablet    NORCO    20 tablet    Take 1-2 tablets by mouth every 4 hours as needed for moderate to severe pain       lisinopril 10 MG tablet    PRINIVIL/ZESTRIL     Take 0.5 tablets (5 mg) by mouth daily       LOPRESSOR PO      Take 25 mg by mouth 2 times daily       LORazepam 0.5 MG tablet    ATIVAN    21 tablet    Take 1 tablet (0.5 mg) by mouth every 8 hours as needed for anxiety       nitroglycerin 0.4 MG sublingual tablet    NITROSTAT    25 tablet    For chest pain place 1 tablet under the tongue every 5 minutes for 3 doses. If symptoms persist 5 minutes after 1st dose call 911.       * OCUVITE PO      Take 1 tablet by mouth       * CENTRUM SILVER per tablet      Take 1 tablet by mouth daily       ticagrelor 90 MG tablet    BRILINTA    60 tablet    Take 1 tablet (90 mg) by mouth every 12 hours       * Notice:  This list has 2 medication(s) that are the same as other medications prescribed for you. Read the directions carefully, and ask your doctor or other care provider to review them with you.

## 2017-05-10 NOTE — LETTER
5/10/2017    Isaac Nunez DO  [facility]   [address]  [facility] [address]    RE: Pablo Fuentes       Dear Colleague,    I had the pleasure of seeing Pablo Fuentes in the [facility].    I had the pleasure of following up with Pablo Fuentes along with his wife today in the Cardiology Clinic regarding his coronary artery disease and paroxysmal atrial fibrillation.  Pablo is a very pleasant 76-year-old gentleman who is a patient of Dr. Benites, having established care with him during his admission.      Pablo has a history of hypertension, hyperlipidemia, chronic knee pain, chronic back pain, recent admission for small-bowel obstruction with lysis of adhesions, newly diagnosed coronary artery disease and paroxysmal atrial fibrillation.  He was admitted to [facility] 04/02/2017 with chest discomfort and emesis.  Troponin was elevated and peaked at 13.  There were some ST abnormalities in the precordial leads.  An echocardiogram was performed that showed LVEF of 60% with basal lateral hypokinesis.  There was grade I or early diastolic dysfunction.  There was mild-to-moderate LVH.  No significant valve disease was detected.  Due to the findings, he underwent cardiac catheterization 04/03/2017.  He had a drug-eluting stent placed to the mid circumflex due to a subtotal occlusion.  There was a remaining 75% small second diagonal, moderate disease in the mid-LAD and approximately a 70% proximal RCA lesion that underwent FFR testing that was negative.  He has had an episode of paroxysmal atrial fibrillation with RVR during his admission.  He was initially started on Eliquis, but he spontaneously converted, and given it was just a single episode he did not go home with Eliquis.  Given he was asymptomatic with the AFib, he was sent out with a Zio Patch monitor for 14 days.  His blood pressure medications were switched from amlodipine and metoprolol to  lisinopril and metoprolol in the setting of an NSTEMI and coronary artery disease.      I saw Pablo in followup 04/18/2017.  He noted his blood pressures were running lower with systolics in the 90s at home and he had associated fatigue with this.  His blood pressure in the office is 98/76.  We decreased his lisinopril from 10 mg daily to 5 mg daily.  He wore the Zio Patch monitor for the duration of 2 weeks.  The monitor showed he was having paroxysmal atrial fibrillation with RVR, there was a 7% AFib burden.  The longest duration was 17 hours and 6 minutes.  While in AFib, the average heart rate was 87 beats per minute.  He did have 1 episode of 4 beats of nonsustained VT.      Pablo presents today for followup.  He tells me that with the change in lisinopril, his blood pressures are better at home and he has less of fatigue.  His only fatigue now is after cardiac rehab, and this is actually getting better, it is getting easier to complete cardiac rehab.  He has no bleeding issues with aspirin and Brilinta.  He does note that he has some bruising.  He is tolerating atorvastatin.  He denies any significant palpitations.  He occasionally has some lightheadedness and wonders if this could be while he is in AFib.  He has had no chest pain or shortness of breath or edema.  He continues to watch his diet and tries to eat a heart healthy diet.  He continues to have chronic knee pain and back pain.  He has been using Ativan since his discharge from the hospital for times of increased stress/anxiety.      PHYSICAL EXAMINATION:   VITAL SIGNS:  Blood pressure 114/72, pulse 76.  Weight 177 pounds 8 ounces, BMI 25.52.   GENERAL:  Elderly male, severely deconditioned, no apparent distress.   LUNGS:  Clear.   CARDIAC:  Regular with frequent ectopy, S1, S2, no significant murmurs appreciated.   ABDOMEN:  Soft.  Bowel sounds positive, nontender.   EXTREMITIES:  Warm without pitting edema.   NEUROLOGIC:  Alert and oriented x3.  He  walks with a walker/uses a wheelchair.      DIAGNOSTICS:  Please see the HPI and EMR for results of the recent Zio Patch monitor which shows paroxysmal atrial fibrillation with RVR.     Outpatient Encounter Prescriptions as of 5/10/2017   Medication Sig Dispense Refill     lisinopril (PRINIVIL/ZESTRIL) 10 MG tablet Take 0.5 tablets (5 mg) by mouth daily       aspirin EC 81 MG EC tablet Take 1 tablet (81 mg) by mouth daily 120 tablet 11     LORazepam (ATIVAN) 0.5 MG tablet Take 1 tablet (0.5 mg) by mouth every 8 hours as needed for anxiety 21 tablet 0     atorvastatin (LIPITOR) 40 MG tablet Take 1 tablet (40 mg) by mouth daily 30 tablet 3     ticagrelor (BRILINTA) 90 MG tablet Take 1 tablet (90 mg) by mouth every 12 hours 60 tablet 11     nitroglycerin (NITROSTAT) 0.4 MG sublingual tablet For chest pain place 1 tablet under the tongue every 5 minutes for 3 doses. If symptoms persist 5 minutes after 1st dose call 911. 25 tablet 0     Biotin (BIOTIN MAXIMUM STRENGTH) 10 MG TABS tablet Take 10,000 mcg by mouth        Cranberry 300 MG TABS Take 300 mg by mouth        Multiple Vitamins-Minerals (CENTRUM SILVER) per tablet Take 1 tablet by mouth daily       HYDROcodone-acetaminophen (NORCO) 5-325 MG per tablet Take 1-2 tablets by mouth every 4 hours as needed for moderate to severe pain 20 tablet 0     Multiple Vitamins-Minerals (OCUVITE PO) Take 1 tablet by mouth        Metoprolol Tartrate (LOPRESSOR PO) Take 25 mg by mouth 2 times daily       No facility-administered encounter medications on file as of 5/10/2017.       ASSESSMENT:   1.  Coronary artery disease.   a.  04/03/2017 NSTEMI, coronary angiogram status post drug-eluting stent to the mid circ for a subtotal occlusion.  There was remaining moderate disease in the small LAD and a 75% stenosis in the second diagonal.  There was a 70% proximal RCA with a negative FFR.   b.  He remains on aspirin 81 mg daily and Brilinta 90 mg twice daily, metoprolol, lisinopril,  "atorvastatin 40 mg daily.   c.  No further chest pain, shortness of breath, palpitations or heart failure symptoms.   2.  Paroxysmal atrial fibrillation.   a.  He had an episode during his hospitalization, but he spontaneously converted.  The Zio Patch monitor was worn for 2 weeks following discharge, and this showed paroxysmal atrial fibrillation with RVR.  There was a 7% AFib burden.  His average heart rate when in AFib was 87.  He is on metoprolol 25 mg twice daily.   b.  CHADS2-VASc score is at least 4 (age 1, coronary artery disease, hypertension).  He was initially started on Eliquis in the hospital, but it was discontinued due to the 1 time episode.  He currently is on aspirin and Brilinta for his recent stents.   c.  Asymptomatic.   3.  Dyslipidemia, treated with atorvastatin 40 mg daily.   4.  Hypertension, blood pressure currently controlled.      PLAN:   1.  Pablo is doing well after his admission for MI and stenting.  He has no chest pain, palpitations or evidence of heart failure on exam.   2.  Continued dual antiplatelet therapy, which he seems to be tolerating with some minimal bruising.   3.  Blood pressure is well controlled on current dosing of metoprolol and lisinopril.   4.  He will continue high-intensity statin.  When he sees Dr. Benites in June, he will have a repeat fasting lipid panel and ALT.   5.  We discussed the Zio Patch monitor results showing paroxysmal atrial fibrillation with RVR.  Pablo became quite \"stressed\" during this discussion, having to take Ativan during the office visit.  We discussed that AFib is very common, and we reviewed his CHADS2-VASc score being 4, which is a 4.8% annual stroke risk.  At this time, since he is asymptomatic, he would like to review with his primary doctor, Dr. Nunez, on 05/25 when he sees him and then discuss with Dr. Benites 06/09.  If we start anticoagulation, we may consider changing Brilinta to Plavix, and dropping aspirin.  I will defer this to  " Delmis.   6.  Continue cardiac rehab.   7.  He will continue to try to follow a heart healthy diet.      Thank you very much for allowing me to participate in the care of Pablo Fuentes.     Sincerely,    Lily Jacobo PA-C     [facility]

## 2017-05-10 NOTE — PATIENT INSTRUCTIONS
The monitor shows you have episodes of afib that come and go.   Given your risk of developing a clot that could cause a stroke 4.8% annual risk, ideally you should be on a blood thinner. Right now, you are on aspirin and Brilinta for the stents.     If we put you on a blood thinner, we would need to change the stent medications just a bit to decrease bleeding risk.     Per your request, discuss with Dr. Nunez 5/25/17, and then discuss with Dr. Benites on 6/9/17.     I would also discuss with Dr. Nunez your need for Ativan - possibly there is something you can use daily to better control your need of the Ativan.

## 2017-05-11 ENCOUNTER — HOSPITAL ENCOUNTER (OUTPATIENT)
Dept: CARDIAC REHAB | Facility: CLINIC | Age: 77
End: 2017-05-11
Attending: INTERNAL MEDICINE
Payer: MEDICARE

## 2017-05-11 PROCEDURE — 93798 PHYS/QHP OP CAR RHAB W/ECG: CPT | Performed by: OCCUPATIONAL THERAPIST

## 2017-05-11 PROCEDURE — 40000116 ZZH STATISTIC OP CR VISIT: Performed by: OCCUPATIONAL THERAPIST

## 2017-05-11 NOTE — PROGRESS NOTES
HISTORY OF PRESENT ILLNESS:  I had the pleasure of following up with Pablo Fuentes along with his wife today in the Cardiology Clinic regarding his coronary artery disease and paroxysmal atrial fibrillation.  Pablo is a very pleasant 76-year-old gentleman who is a patient of Dr. Benites, having established care with him during his admission.      Pablo has a history of hypertension, hyperlipidemia, chronic knee pain, chronic back pain, recent admission for small-bowel obstruction with lysis of adhesions, newly diagnosed coronary artery disease and paroxysmal atrial fibrillation.  He was admitted to [facility] 04/02/2017 with chest discomfort and emesis.  Troponin was elevated and peaked at 13.  There were some ST abnormalities in the precordial leads.  An echocardiogram was performed that showed LVEF of 60% with basal lateral hypokinesis.  There was grade I or early diastolic dysfunction.  There was mild-to-moderate LVH.  No significant valve disease was detected.  Due to the findings, he underwent cardiac catheterization 04/03/2017.  He had a drug-eluting stent placed to the mid circumflex due to a subtotal occlusion.  There was a remaining 75% small second diagonal, moderate disease in the mid-LAD and approximately a 70% proximal RCA lesion that underwent FFR testing that was negative.  He has had an episode of paroxysmal atrial fibrillation with RVR during his admission.  He was initially started on Eliquis, but he spontaneously converted, and given it was just a single episode he did not go home with Eliquis.  Given he was asymptomatic with the AFib, he was sent out with a Zio Patch monitor for 14 days.  His blood pressure medications were switched from amlodipine and metoprolol to lisinopril and metoprolol in the setting of an NSTEMI and coronary artery disease.      I saw Pablo in followup 04/18/2017.  He noted his blood pressures were running lower with systolics in the 90s at home and he had associated  fatigue with this.  His blood pressure in the office is 98/76.  We decreased his lisinopril from 10 mg daily to 5 mg daily.  He wore the Zio Patch monitor for the duration of 2 weeks.  The monitor showed he was having paroxysmal atrial fibrillation with RVR, there was a 7% AFib burden.  The longest duration was 17 hours and 6 minutes.  While in AFib, the average heart rate was 87 beats per minute.  He did have 1 episode of 4 beats of nonsustained VT.      Pablo presents today for followup.  He tells me that with the change in lisinopril, his blood pressures are better at home and he has less of fatigue.  His only fatigue now is after cardiac rehab, and this is actually getting better, it is getting easier to complete cardiac rehab.  He has no bleeding issues with aspirin and Brilinta.  He does note that he has some bruising.  He is tolerating atorvastatin.  He denies any significant palpitations.  He occasionally has some lightheadedness and wonders if this could be while he is in AFib.  He has had no chest pain or shortness of breath or edema.  He continues to watch his diet and tries to eat a heart healthy diet.  He continues to have chronic knee pain and back pain.  He has been using Ativan since his discharge from the hospital for times of increased stress/anxiety.      PHYSICAL EXAMINATION:   VITAL SIGNS:  Blood pressure 114/72, pulse 76.  Weight 177 pounds 8 ounces, BMI 25.52.   GENERAL:  Elderly male, severely deconditioned, no apparent distress.   LUNGS:  Clear.   CARDIAC:  Regular with frequent ectopy, S1, S2, no significant murmurs appreciated.   ABDOMEN:  Soft.  Bowel sounds positive, nontender.   EXTREMITIES:  Warm without pitting edema.   NEUROLOGIC:  Alert and oriented x3.  He walks with a walker/uses a wheelchair.      DIAGNOSTICS:  Please see the HPI and EMR for results of the recent Zio Patch monitor which shows paroxysmal atrial fibrillation with RVR.      ASSESSMENT:   1.  Coronary artery disease.  "  a.  04/03/2017 NSTEMI, coronary angiogram status post drug-eluting stent to the mid circ for a subtotal occlusion.  There was remaining moderate disease in the small LAD and a 75% stenosis in the second diagonal.  There was a 70% proximal RCA with a negative FFR.   b.  He remains on aspirin 81 mg daily and Brilinta 90 mg twice daily, metoprolol, lisinopril, atorvastatin 40 mg daily.   c.  No further chest pain, shortness of breath, palpitations or heart failure symptoms.   2.  Paroxysmal atrial fibrillation.   a.  He had an episode during his hospitalization, but he spontaneously converted.  The Zio Patch monitor was worn for 2 weeks following discharge, and this showed paroxysmal atrial fibrillation with RVR.  There was a 7% AFib burden.  His average heart rate when in AFib was 87.  He is on metoprolol 25 mg twice daily.   b.  CHADS2-VASc score is at least 4 (age 1, coronary artery disease, hypertension).  He was initially started on Eliquis in the hospital, but it was discontinued due to the 1 time episode.  He currently is on aspirin and Brilinta for his recent stents.   c.  Asymptomatic.   3.  Dyslipidemia, treated with atorvastatin 40 mg daily.   4.  Hypertension, blood pressure currently controlled.      PLAN:   1.  Pablo is doing well after his admission for MI and stenting.  He has no chest pain, palpitations or evidence of heart failure on exam.   2.  Continued dual antiplatelet therapy, which he seems to be tolerating with some minimal bruising.   3.  Blood pressure is well controlled on current dosing of metoprolol and lisinopril.   4.  He will continue high-intensity statin.  When he sees Dr. Benites in June, he will have a repeat fasting lipid panel and ALT.   5.  We discussed the Zio Patch monitor results showing paroxysmal atrial fibrillation with RVR.  Pablo became quite \"stressed\" during this discussion, having to take Ativan during the office visit.  We discussed that AFib is very common, and we " reviewed his CHADS2-VASc score being 4, which is a 4.8% annual stroke risk.  At this time, since he is asymptomatic, he would like to review with his primary doctor, Dr. Nunez, on  when he sees him and then discuss with Dr. Benites .  If we start anticoagulation, we may consider changing Brilinta to Plavix, and dropping aspirin.  I will defer this to Dr. Benites.   6.  Continue cardiac rehab.   7.  He will continue to try to follow a heart healthy diet.      Thank you very much for allowing me to participate in the care of Luh Fuentes.      DELVIN Viveros PA-C             D: 05/10/2017 11:04   T: 2017 03:20   MT: PARTH      Name:     LUH FUENTES   MRN:      [id number]        Account:      [id number]   :      1940           Service Date: 05/10/2017      Document: [id number]

## 2017-05-12 ENCOUNTER — HOSPITAL ENCOUNTER (OUTPATIENT)
Dept: CARDIAC REHAB | Facility: CLINIC | Age: 77
End: 2017-05-12
Attending: INTERNAL MEDICINE
Payer: MEDICARE

## 2017-05-12 PROCEDURE — 40000116 ZZH STATISTIC OP CR VISIT: Performed by: OCCUPATIONAL THERAPIST

## 2017-05-12 PROCEDURE — 93798 PHYS/QHP OP CAR RHAB W/ECG: CPT | Performed by: OCCUPATIONAL THERAPIST

## 2017-05-15 ENCOUNTER — HOSPITAL ENCOUNTER (OUTPATIENT)
Dept: CARDIAC REHAB | Facility: CLINIC | Age: 77
End: 2017-05-15
Attending: INTERNAL MEDICINE
Payer: MEDICARE

## 2017-05-15 PROCEDURE — 40000116 ZZH STATISTIC OP CR VISIT: Performed by: OCCUPATIONAL THERAPIST

## 2017-05-15 PROCEDURE — 93798 PHYS/QHP OP CAR RHAB W/ECG: CPT | Performed by: OCCUPATIONAL THERAPIST

## 2017-05-17 ENCOUNTER — HOSPITAL ENCOUNTER (OUTPATIENT)
Dept: CARDIAC REHAB | Facility: CLINIC | Age: 77
End: 2017-05-17
Attending: INTERNAL MEDICINE
Payer: MEDICARE

## 2017-05-17 PROCEDURE — 40000116 ZZH STATISTIC OP CR VISIT: Performed by: REHABILITATION PRACTITIONER

## 2017-05-17 PROCEDURE — 93798 PHYS/QHP OP CAR RHAB W/ECG: CPT | Performed by: REHABILITATION PRACTITIONER

## 2017-05-19 ENCOUNTER — HOSPITAL ENCOUNTER (OUTPATIENT)
Dept: CARDIAC REHAB | Facility: CLINIC | Age: 77
End: 2017-05-19
Attending: INTERNAL MEDICINE
Payer: MEDICARE

## 2017-05-19 VITALS — HEIGHT: 70 IN | BODY MASS INDEX: 25.48 KG/M2 | WEIGHT: 178 LBS

## 2017-05-19 PROCEDURE — 40000575 ZZH STATISTIC OP CARDIAC VISIT #2

## 2017-05-19 PROCEDURE — 40000116 ZZH STATISTIC OP CR VISIT

## 2017-05-19 PROCEDURE — 93797 PHYS/QHP OP CAR RHAB WO ECG: CPT

## 2017-05-19 PROCEDURE — 93798 PHYS/QHP OP CAR RHAB W/ECG: CPT

## 2017-05-19 ASSESSMENT — 6 MINUTE WALK TEST (6MWT)
GENDER SELECTION: MALE
PREDICTED: 1659.85
MALE CALC: 1649.79
TOTAL DISTANCE WALKED (FT): 145
FEMALE CALC: 1331.8

## 2017-05-19 NOTE — PROGRESS NOTES
" 05/19/17 Bruce Fuentes  76 year old  NSTEMI/ Stent     Session    Physician cosignature/electronic signature indicates approval of this ITP document. I have established, reviewed and made necessary changes to the individualized treatment plan and exercise prescription for this patient.   Session 60 Day Individualized Treatment Plan   Certified through this date 07/08/17   Cardiac Rehab Assessment   Cardiac Rehab Assessment 5/4/17 Progress update done today. PT has been making gradual progress with rehab. He is limited to seated exercises due to his knees. He is tolerating the Nustep well and plan to add the arm ergometer next session. He has attended physical therapy in the past, as he was at [facility] post a surgery. At that time, he was given exercises to continue at home. PT does L/E and U/E cals, bands and weights at home. PT has been having some anxiety at home. He has noticed that he becomes anxious right before taking his shower at night, as this is the time he had his heart attack. He has been taking his \"stress pill\" just prior to showering , and this has helped. He also takes a \"stress pill\" just prior to rehab. He has noted \"twinges\" in the chest area, but feels he is very sensitive to everything going on in his body. He does plan to attend nutrition classes. Skilled therapy needed to progress PT to his goal of 45 minutes of aerobic exercise in order to resume all ADL's.  5/19/17 Patient and his wife both present for 1:1 consultation today. Discussed progression and limitations with patients knee pain. Patient currently uses mostly upper body muscles to engage in exercise. Patient reports this is beneficial for his knees. He is making gradual progression in rehab. Reviewed goals and MET goal today during cosult. Patient's wife reports they are working on dietary changes including cutting salt, butter and fats from diet. He is currently wearing an event monitor for 2 weeks to monitor for Afib. " From what patient has been told, he's been in afib 4% of the time. During rehab sessions, 1 possible bout of afib was noted. Otherwise patient has been in NSR with exercise. Patient is anxious about the possibility of being put on blood thinners in the future. He is currently taking a PRN medication to help with his anxiety. Patient and his wife report it's helping, but would like to discuss being put on a daily medication that can help keep him stable instead of taking a med PRN. Patient continues to benefit from skilled therapy to monitor CV response to exercise, to educate on risk management and behavior change to achieve patient's goals.    General Information   Treatment Diagnosis NSTEMI   Date of Treatment Diagnosis 04/02/17   Secondary Treatment Diagnosis Stent   Significant Past CV History None   Comorbidities None   Other Medical History small bowel obstruction surgery on 03/24/2017 patient was recovering from surgery and had MI, stomach surgery 2/13/2015.     Lead up symptoms chest pain ; with numbness on left side   Hospital Location [facility]    Hospital Discharge Date 04/06/17   Signs and Symptoms Post Hospital Discharge Fatigue;Anxiety   Outpatient Cardiac Rehab Start Date 04/13/17   Primary Physician Isaac Nunez   Primary Physician Follow Up Scheduled   Cardiologist Miguel Benites   Cardiologist Follow Up Scheduled   Ejection Fraction 60%    Risk Stratification Low   Summary of Cath Report   Summary of Cath Report Available   Date Performed 04/03/17   LAD no focal stenosis   D2 Less than 2 mm D2 has a   LCX mid stent placed    RCA RCA dominant 60 to 70% calcified proximal lesion. Mid and distal   Living and Work Status    Living Arrangements and Social Status house  (PT has grab bars in bathroom)   Support System Live with an adult   Return to Employment Retired   Occupation steel    Preventative Medications   CMS recommended medications Ace  "inhibitors;Antiplatelets;Beta Blocker;Lipid Lowering;Influenza vaccination;Pneumonia vaccination   Falls Screen   Have you fallen two or more times in the past year? No   Have you fallen and had an injury in the past year? No   Pain   Patient Currently in Pain No   Additional Pain Locations? (PT does have some incisional achiness from his bowel surgery)   Physical Assessments   Incisions WNL  (had to go in both wrists and femoral artery)   Edema None   Right Lung Sounds not assessed   Left Lung Sounds not assessed   Limitations Orthopedic   Comments PT had recent bowel surgery ; has some achiness in incisions .  PT was encouraged to sit up straight and do deep breathing exercises several times per day.     Individualized Treatment Plan   Monitored Sessions Scheduled 24   Monitored Sessions Attended 12   Oxygen   Supplemental Oxygen needed No   Nutrition Management - Weight Management   Assessment Re-assessment   Age 76   Weight 80.7 kg (178 lb)   Height 1.778 m (5' 10\")   BMI (Calculated) 25.59   Initial Rate Your Plate Score. Dietary tool to assess eating patterns. Scores range from 24 to 72. The higher the score the healthier the eating pattern. 47   Nutrition Management - Lipids   Lipids Labs Available   Date 04/02/17   Total Cholesterol 129   Triglycerides 83   HDL 85   LDL 68   Prescribed Lipid Medication Yes   Statin Intensity High Intensity   Nutrition Management - Diabetes   Diabetes No   Nutrition Management Summary   Dietary Recommendations Low Fat;Low Cholesterol;Low Sodium   Stages of Change for Diet Compliance Action   Interventions Planned Attend Nutrition Education Class(es);Instruct on Label Reading;Educate on Benefits of Exercise   Interventions In Progress or Completed Understands how to accurately read Food Labels   Nutrition Summary Comments PT has changed his diet he is eating less butter using olive oil. 5/4/17 In the past, PT used butter on everything. He has cut back to 1 pat of butter per " day. He has increased fruits and vegetable intake. His wife is making fruit and vegetable smoothies in the  for him. She is also using accent-salt substitute. They plan to attend nutrition classes.  5/19/17 Patient is increasing fruit and vegetable intake, reducing amount of butter and trying salt supplements. Patient's wife is interested in attending nutrition class, but patient struggles to sit in a chair for 1 hour due to pain. Patient may bring a pillow from home so he is able to attend class.    Nutrition Target Outcome Weight loss .5-1 lb/week (if BMI > 25)   Psychosocial Management   Psychosocial Assessment Re-assessment   Is there history of clinical depression or increased risk of depression? No previous history   Current Level of Stress per Patient Report Moderate   (due to recent MI)   Current Coping Skills Is on Medication for Depression/Anxiety   Initial Patient Health Questionnaire -9 Score (PHQ-9) for depression. 5-9 Minimal symptoms, 10-14 Minor depression, 15-19 Major depression, moderately severe, > 20 Major depression, severe  4   Initial [facility] [place] Survey score.  Quality of Life:   If total score > 25 review individual areas where patient rated a 4 or 5.  Consider patients current medical condition and what role that plays on the score.   Adjust treatment protocol to improve areas of concern.  Consider the following:  PHQ9 score, DASI, and re-assessment within the next 30 days to assist with developing treatments.  20   Stages of Change Action   Interventions Planned Patient to verbalize understanding of negative impact of stress to personal health   Interventions In Progress or Completed Patient verbalizes understanding of negative impact of stress to personal health;Patient is able to identify positive support system   Psychosocial Comments PT has been taking anti-anxiety medications when he feels stressed. He has recognized that he has anxiety at night just before taking a shower.  "He had his MI while in the shower. He now is trying to change his routine up a bit, and taking a \"stress pill\" to relieve anxiety 5/19/17 Patient reports the PRN medication for anxiety is helping, but will discuss with primary MD next week about being put on a medication for daily use. Discussed deep breathing and relaxation with techniques with patient today.   Psychosocial Target Outcome Maximize coping skills   Other Core Components - Hypertension   History of or Diagnosis of Hypertension Yes   Currently taking Anti-Hypertensives Yes;Beta blocker;Ace Inhibitor   Hypertension Comments 5/19/17 Patient had an elevated resting BP 1 rehab session. Otherwise BP has been WNLs   Other Core Components - Tobacco   History of Tobacco Use Yes   Quit Date or Planned Quit Date 01/01/65   Tobacco Use Status Former (Quit > 6 mo ago)   Tobacco Habit Cigarettes   Stages of Change Maintenance   Other Core Components Summary   Interventions Planned Educate on importance of maintaining low sodium diet   Interventions In Progress or Completed Educated on importance of maintaining low sodium diet   Activity/Exercise History   Activity/Exercise Assessment Re-assessment   Activity/Exercise Status prior to event? Sedentary   Number of Days Currently participating in Moderate Physical Activity? 4   Number of Days Currently performing  Aerobic Exercise (including rehab)? 2 to 3  (rehab)   Number of Minutes per Session Currently of Aerobic Exercise (average)? 30   Current Stage of Change (Physical Activity) Preparation   Current Stage of Change (Aerobic Exercise) Preparation   Patient Goals Goal #1   Goal #1 Description PT to return to his previous chair exercise up to 45 min 2-3 days per week without symptoms.     Goal #1 Target Date 06/16/17   Goal #1 Progress Towards Goal 5/4/17 PT has been starting to do cals, bands and light weights at home. He did 60 to 70 reps of each prior to MI. He has started with 10 to 15 reps. Prior to MI, PT did " about 1 hour of exercises daily.  5/19/17 Patient is feeling challenged in rehab and needs rest days in between exercise to recover. Patient does weights and low impact cals at home, but has not started aerobic exercise.   Activity/Exercise Comments PT is significantly limited by knee discomfort he needs bilateral knee replacements.  PT walks very little using wheeled walked extremely low endurance for walking exercise   Activity/Exercise Target Outcome An Accumulation of 150  Minutes of Aerobic Activity per Week   Exercise Assessment   6 Minute Walk Predicted - Gender Selection Male   6 Minute Walk Predicted (Male) 1649.79   6 Minute Walk Predicted (Female) 1331.8   Initial 6 Minute Walk Distance (Feet) 145 ft   Resting HR 58 bpm   Exercise HR 78 bpm   Post Exercise HR 65 bpm   Resting /80   Exercise /90   Post Exercise /90   Effort Rating 5   Current MET Level 2.5   MET Level Goal 3 -4   ECG Rhythm Normal sinus rhythm   Ectopy PVCs   Current Symptoms Fatigue;Joint pain   Limitations/Restrictions Orthopedic (see comments);Other (see comments)  (radial and femoral angios; recent bowel surgery one week junior)   Exercise Prescription   Mode Nustep;Arm Ergometer;Weights   Duration/Time Intermittent bouts   Frequency 3 daysweek   THR (85% of age predicted max HR) 120.7   OMNI Effort Rating (0-10 Scale) 4-6/10   Progression Intermittent bouts;Total exercise time of 20-30 minutes;Aerobic exercise to OMNI rating of 6 or below and at or below THR;Progress peak intensity by 1/4 MET per week   Comments PT will most likely be limited by his knees will do non weight bearing exercises    Recommended Home Exercise   Type of Exercise Weights;Low Impact Calisthenics;LE strengthening program;Other (comments)  (chair exercises)   Frequency (days per week) 2-3   Duration (minutes per session) 15-30 min   Effort Rating Recommended 4-6/10   Current Home Exercise   Type of Exercise Low Impact Calisthenics;LE Strengthening  Program   Frequency (days per week) 2-3   Duration (minutes per session) 10-15   Follow-up/On-going Support   Provider follow-up needed on the following No follow-up needed   Learning Assessment   Learner Patient   Primary Language English   Preferred Learning Style Listening;Reading   Barriers to Learning No barriers noted   Patient Education   Education recommended Anatomy and Physiology of the Heart;Blood Pressure;Exercise Principles;Medication Overview;Muscle Conditioning;Nutrition;Stress Management;Weight Loss

## 2017-05-22 ENCOUNTER — HOSPITAL ENCOUNTER (OUTPATIENT)
Dept: CARDIAC REHAB | Facility: CLINIC | Age: 77
End: 2017-05-22
Attending: INTERNAL MEDICINE
Payer: MEDICARE

## 2017-05-22 PROCEDURE — 93798 PHYS/QHP OP CAR RHAB W/ECG: CPT

## 2017-05-22 PROCEDURE — 40000116 ZZH STATISTIC OP CR VISIT

## 2017-05-24 ENCOUNTER — HOSPITAL ENCOUNTER (OUTPATIENT)
Dept: CARDIAC REHAB | Facility: CLINIC | Age: 77
End: 2017-05-24
Attending: INTERNAL MEDICINE
Payer: MEDICARE

## 2017-05-24 PROCEDURE — 40000116 ZZH STATISTIC OP CR VISIT: Performed by: REHABILITATION PRACTITIONER

## 2017-05-24 PROCEDURE — 93798 PHYS/QHP OP CAR RHAB W/ECG: CPT | Performed by: REHABILITATION PRACTITIONER

## 2017-05-25 ENCOUNTER — TRANSFERRED RECORDS (OUTPATIENT)
Dept: CARDIOLOGY | Facility: CLINIC | Age: 77
End: 2017-05-25

## 2017-05-25 LAB
ALBUMIN SERPL-MCNC: 4.3 G/DL (ref 3.4–5)
ALP SERPL-CCNC: 104 U/L (ref 0–116)
ALT SERPL-CCNC: 45 U/L (ref 0–78)
ANION GAP SERPL CALCULATED.3IONS-SCNC: ABNORMAL MMOL/L
AST SERPL-CCNC: 30 U/L (ref 0–37)
BILIRUB SERPL-MCNC: 1.1 MG/DL (ref 0.2–1)
BUN SERPL-MCNC: 21 MG/DL (ref 7–18)
CALCIUM SERPL-MCNC: 8.9 MG/DL (ref 8.5–10.1)
CHLORIDE SERPLBLD-SCNC: 104 MMOL/L (ref 98–107)
CHOLEST SERPL-MCNC: 98 MG/DL (ref 0–200)
CO2 SERPL-SCNC: ABNORMAL MMOL/L
CREAT SERPL-MCNC: 1 MG/DL (ref 0.6–1.3)
GFR SERPL CREATININE-BSD FRML MDRD: ABNORMAL ML/MIN/1.73M2
GLUCOSE SERPL-MCNC: 94 MG/DL (ref 70–99)
HDLC SERPL-MCNC: 45 MG/DL (ref 40–96)
LDLC SERPL CALC-MCNC: 33.2 MG/DL (ref 0–130)
NONHDLC SERPL-MCNC: 19.8 MG/DL (ref 5–40)
POTASSIUM SERPL-SCNC: 4.5 MMOL/L (ref 3.5–5.1)
PROT SERPL-MCNC: 6.9 G/DL (ref 6.4–8.2)
SODIUM SERPL-SCNC: 141 MMOL/L (ref 136–145)
TRIGL SERPL-MCNC: 99 MG/DL (ref 30–200)

## 2017-05-26 ENCOUNTER — HOSPITAL ENCOUNTER (OUTPATIENT)
Dept: CARDIAC REHAB | Facility: CLINIC | Age: 77
End: 2017-05-26
Attending: INTERNAL MEDICINE
Payer: MEDICARE

## 2017-05-26 PROCEDURE — 40000116 ZZH STATISTIC OP CR VISIT

## 2017-05-26 PROCEDURE — 93798 PHYS/QHP OP CAR RHAB W/ECG: CPT

## 2017-05-31 ENCOUNTER — HOSPITAL ENCOUNTER (OUTPATIENT)
Dept: CARDIAC REHAB | Facility: CLINIC | Age: 77
End: 2017-05-31
Attending: INTERNAL MEDICINE
Payer: MEDICARE

## 2017-05-31 PROCEDURE — 93798 PHYS/QHP OP CAR RHAB W/ECG: CPT | Performed by: OCCUPATIONAL THERAPIST

## 2017-05-31 PROCEDURE — 40000116 ZZH STATISTIC OP CR VISIT: Performed by: OCCUPATIONAL THERAPIST

## 2017-06-02 ENCOUNTER — HOSPITAL ENCOUNTER (OUTPATIENT)
Dept: CARDIAC REHAB | Facility: CLINIC | Age: 77
End: 2017-06-02
Attending: INTERNAL MEDICINE
Payer: MEDICARE

## 2017-06-02 PROCEDURE — 93798 PHYS/QHP OP CAR RHAB W/ECG: CPT | Performed by: OCCUPATIONAL THERAPIST

## 2017-06-02 PROCEDURE — 40000116 ZZH STATISTIC OP CR VISIT: Performed by: OCCUPATIONAL THERAPIST

## 2017-06-05 ENCOUNTER — HOSPITAL ENCOUNTER (OUTPATIENT)
Dept: CARDIAC REHAB | Facility: CLINIC | Age: 77
End: 2017-06-05
Attending: INTERNAL MEDICINE
Payer: MEDICARE

## 2017-06-05 ENCOUNTER — TELEPHONE (OUTPATIENT)
Dept: CARDIOLOGY | Facility: CLINIC | Age: 77
End: 2017-06-05

## 2017-06-05 PROCEDURE — 40000116 ZZH STATISTIC OP CR VISIT

## 2017-06-05 PROCEDURE — 93798 PHYS/QHP OP CAR RHAB W/ECG: CPT

## 2017-06-06 ENCOUNTER — TELEPHONE (OUTPATIENT)
Dept: CARDIOLOGY | Facility: CLINIC | Age: 77
End: 2017-06-06

## 2017-06-07 ENCOUNTER — HOSPITAL ENCOUNTER (OUTPATIENT)
Dept: CARDIAC REHAB | Facility: CLINIC | Age: 77
End: 2017-06-07
Attending: INTERNAL MEDICINE
Payer: MEDICARE

## 2017-06-07 PROCEDURE — 93798 PHYS/QHP OP CAR RHAB W/ECG: CPT | Performed by: REHABILITATION PRACTITIONER

## 2017-06-07 PROCEDURE — 40000116 ZZH STATISTIC OP CR VISIT: Performed by: REHABILITATION PRACTITIONER

## 2017-06-09 ENCOUNTER — OFFICE VISIT (OUTPATIENT)
Dept: CARDIOLOGY | Facility: CLINIC | Age: 77
End: 2017-06-09
Attending: NURSE PRACTITIONER
Payer: MEDICARE

## 2017-06-09 VITALS
HEIGHT: 70 IN | BODY MASS INDEX: 24.77 KG/M2 | HEART RATE: 64 BPM | DIASTOLIC BLOOD PRESSURE: 78 MMHG | WEIGHT: 173 LBS | SYSTOLIC BLOOD PRESSURE: 124 MMHG

## 2017-06-09 DIAGNOSIS — I21.4 NSTEMI (NON-ST ELEVATED MYOCARDIAL INFARCTION) (H): ICD-10-CM

## 2017-06-09 PROCEDURE — 99215 OFFICE O/P EST HI 40 MIN: CPT | Mod: 24 | Performed by: INTERNAL MEDICINE

## 2017-06-09 RX ORDER — CITALOPRAM HYDROBROMIDE 20 MG/1
10 TABLET ORAL DAILY
COMMUNITY
End: 2018-09-10

## 2017-06-09 NOTE — PROGRESS NOTES
HPI and Plan:   See dictation(#[id number])    Orders Placed This Encounter   Procedures     Follow-Up with Cardiac Advanced Practice Provider       Orders Placed This Encounter   Medications     citalopram (CELEXA) 20 MG tablet     Sig: Take 20 mg by mouth daily       There are no discontinued medications.      Encounter Diagnosis   Name Primary?     NSTEMI (non-ST elevated myocardial infarction) (H)        CURRENT MEDICATIONS:  Current Outpatient Prescriptions   Medication Sig Dispense Refill     citalopram (CELEXA) 20 MG tablet Take 20 mg by mouth daily       lisinopril (PRINIVIL/ZESTRIL) 10 MG tablet Take 0.5 tablets (5 mg) by mouth daily       aspirin EC 81 MG EC tablet Take 1 tablet (81 mg) by mouth daily 120 tablet 11     LORazepam (ATIVAN) 0.5 MG tablet Take 1 tablet (0.5 mg) by mouth every 8 hours as needed for anxiety 21 tablet 0     atorvastatin (LIPITOR) 40 MG tablet Take 1 tablet (40 mg) by mouth daily 30 tablet 3     ticagrelor (BRILINTA) 90 MG tablet Take 1 tablet (90 mg) by mouth every 12 hours 60 tablet 11     nitroglycerin (NITROSTAT) 0.4 MG sublingual tablet For chest pain place 1 tablet under the tongue every 5 minutes for 3 doses. If symptoms persist 5 minutes after 1st dose call 911. 25 tablet 0     Biotin (BIOTIN MAXIMUM STRENGTH) 10 MG TABS tablet Take 10,000 mcg by mouth        Cranberry 300 MG TABS Take 300 mg by mouth        Multiple Vitamins-Minerals (CENTRUM SILVER) per tablet Take 1 tablet by mouth daily       HYDROcodone-acetaminophen (NORCO) 5-325 MG per tablet Take 1-2 tablets by mouth every 4 hours as needed for moderate to severe pain 20 tablet 0     Multiple Vitamins-Minerals (OCUVITE PO) Take 1 tablet by mouth        Metoprolol Tartrate (LOPRESSOR PO) Take 25 mg by mouth 2 times daily         ALLERGIES   No Known Allergies    PAST MEDICAL HISTORY:  Past Medical History:   Diagnosis Date     Hypertension        PAST SURGICAL HISTORY:  Past Surgical History:   Procedure Laterality Date  "    BRONCHOSCOPY FLEXIBLE N/A 2/13/2015    Procedure: BRONCHOSCOPY FLEXIBLE;  Surgeon: Christo Gilbert MD;  Location: [facility] OR     CT CORONARY ANGIOGRAM  04/03/2017    KHANG mid Cx NSTEMI     ESOPHAGOSCOPY, GASTROSCOPY, DUODENOSCOPY (EGD), COMBINED N/A 2/10/2015    Procedure: COMBINED ESOPHAGOSCOPY, GASTROSCOPY, DUODENOSCOPY (EGD);  Surgeon: Christo Gilbert MD;  Location: [facility] OR     EYE SURGERY       HERNIA REPAIR       LAPAROSCOPIC ASSISTED INSERTION TUBE GASTROTOMY N/A 2/13/2015    Procedure: LAPAROSCOPIC ASSISTED INSERTION TUBE GASTROSTOMY;  Surgeon: Christo Gilbert MD;  Location: [facility] OR     LAPAROSCOPIC HERNIORRHAPHY GIANT PARAESOPHAGEAL N/A 2/13/2015    Procedure: LAPAROSCOPIC HERNIORRHAPHY GIANT PARAESOPHAGEAL;  Surgeon: Christo Gilbert MD;  Location: [facility] OR     LAPAROSCOPY DIAGNOSTIC (GENERAL) N/A 3/23/2017    Procedure: LAPAROSCOPY DIAGNOSTIC (GENERAL);  Surgeon: Ramón Garcia MD;  Location: [facility] OR       FAMILY HISTORY:  No family history on file.    SOCIAL HISTORY:  Social History     Social History     Marital status:      Spouse name: N/A     Number of children: N/A     Years of education: N/A     Social History Main Topics     Smoking status: Never Smoker     Smokeless tobacco: Not on file     Alcohol use No     Drug use: No     Sexual activity: Not on file     Other Topics Concern     Not on file     Social History Narrative       Review of Systems:  Skin:  Negative       Eyes:  Positive for   cataract extraction of both eyes  ENT:  Positive for hearing loss    Respiratory:  Positive for cough \"not much\"   Cardiovascular:    Positive for;lightheadedness    Gastroenterology: Negative      Genitourinary:  Positive for nocturia    Musculoskeletal:  Positive for joint pain;back pain of the knees  Neurologic:  Positive for numbness or tingling of feet goes away during the night  Psychiatric:  Positive for anxiety due to health issues - has improved  Heme/Lymph/Imm:  " "Positive for easy bruising bruising on left hand mostly  Endocrine:  Negative        Physical Exam:  Vitals: /78 (BP Location: Right arm, Patient Position: Chair, Cuff Size: Adult Regular)  Pulse 64  Ht 1.778 m (5' 10\")  Wt 78.5 kg (173 lb)  BMI 24.82 kg/m2    Constitutional:           Skin:           Head:           Eyes:           ENT:           Neck:           Chest:             Cardiac:                    Abdomen:           Vascular:                                          Extremities and Back:                 Neurological:                 CC  Janette Vo, APRN [facility]  [id number]  [place] [id number]                  "

## 2017-06-09 NOTE — LETTER
6/9/2017    Isaac Nunez DO  [facility]   [address]  [facility] [address]    RE: Pablo Fuentes       Dear Colleague,    I had the pleasure of seeing Pablo Fuentes in the [facility].    REASON FOR CARDIOLOGY VISIT:  Followup non-STEMI.      Mr. Fuentes is a very pleasant 76-year-old gentleman with history of hypertension who I saw in early 04/2017 when he was admitted with chest pain and was diagnosed with non-ST elevation myocardial infarction.  To be noted, prior to that admission, patient was hospitalized recently for small-bowel obstruction and underwent surgery with lysis of adhesions and did not require any bowel resection.  He has other comorbidities of history of hypertension.  He underwent coronary angiogram that revealed a subtotal hazy lesion in the mid circumflex that was successfully treated with 2.5 x 24 mm everolimus Promus Premier drug-eluting stent.  He was additionally found to have an ostial 30% left main, a small LAD vessel around 2.25 mm with no proximal focal stenosis.  There was 50% mid-LAD stenosis, and second diagonal had about 70%-75% stenosis, but it was a small vessel, less than 2 mm.  The RCA had a proximal 60%-70% lesion with FFR of 0.93.  Echocardiogram showed LVEF of 60% with mild basal lateral hypokinesia.  Mild to moderate LVH.  During the hospitalization post-PCI, he had a brief episode of self-resolving atrial fibrillation.  He was discharged on event monitor for 2 weeks.  Paroxysmal episodes of atrial fibrillation were detected.  Overall, 7% burden of atrial fibrillation.  Remarkably, patient was asymptomatic during those episodes.  He was seen in followup by Lily Jacobo, and oral anticoagulation was discussed and recommended in view of his CHADS2-VASc score of 4.  However, the patient was reluctant to start oral anticoagulation.  Today, he is coming in followup accompanied by his wife.  He is doing cardiac  rehabilitation and is feeling quite well.  No chest discomfort or shortness of breath or dizziness or palpitation, presyncope or syncope.  Lipid panel done last month shows LDL quite well controlled at 33.2, HDL 45, total cholesterol 98, triglycerides 99.  The patient and his wife askes several questions about atrial fibrillation and oral anticoagulation.  We talked at length about CHADS2-VASc score of 4, which gives him a stroke risk of about 4.8%.  We also discussed the risk of bleeding and use HAS-BLED score, which gives bleeding risk of around 4%.  The patient presently is on aspirin and Brilinta.  He is on Lipitor 40 mg daily.  He is on metoprolol tartrate 25 mg b.i.d. and lisinopril 5 mg daily.      PHYSICAL EXAMINATION:   VITAL SIGNS:  Blood pressure 124/78, heart rate 64, regular, weight 173 pounds, BMI 24.87.   GENERAL:  The patient appears pleasant, comfortable.   NECK:  Normal JVP, no bruit.   CARDIOVASCULAR SYSTEM:  S1, S2 normal, no murmur, rub or gallop.   RESPIRATORY SYSTEM:  Clear to auscultation bilaterally.   ABDOMEN:  Soft, nontender.   EXTREMITIES:  No pitting pedal edema.   NEUROLOGICAL:  Alert, oriented x3.   PSYCHIATRIC:  Normal affect.   SKIN:  No obvious rash.   HEENT:  No pallor or icterus.     Outpatient Encounter Prescriptions as of 6/9/2017   Medication Sig Dispense Refill     citalopram (CELEXA) 20 MG tablet Take 20 mg by mouth daily       lisinopril (PRINIVIL/ZESTRIL) 10 MG tablet Take 0.5 tablets (5 mg) by mouth daily       aspirin EC 81 MG EC tablet Take 1 tablet (81 mg) by mouth daily 120 tablet 11     LORazepam (ATIVAN) 0.5 MG tablet Take 1 tablet (0.5 mg) by mouth every 8 hours as needed for anxiety 21 tablet 0     atorvastatin (LIPITOR) 40 MG tablet Take 1 tablet (40 mg) by mouth daily 30 tablet 3     ticagrelor (BRILINTA) 90 MG tablet Take 1 tablet (90 mg) by mouth every 12 hours 60 tablet 11     nitroglycerin (NITROSTAT) 0.4 MG sublingual tablet For chest pain place 1 tablet  under the tongue every 5 minutes for 3 doses. If symptoms persist 5 minutes after 1st dose call 911. 25 tablet 0     Biotin (BIOTIN MAXIMUM STRENGTH) 10 MG TABS tablet Take 10,000 mcg by mouth        Cranberry 300 MG TABS Take 300 mg by mouth        Multiple Vitamins-Minerals (CENTRUM SILVER) per tablet Take 1 tablet by mouth daily       HYDROcodone-acetaminophen (NORCO) 5-325 MG per tablet Take 1-2 tablets by mouth every 4 hours as needed for moderate to severe pain 20 tablet 0     Multiple Vitamins-Minerals (OCUVITE PO) Take 1 tablet by mouth        Metoprolol Tartrate (LOPRESSOR PO) Take 25 mg by mouth 2 times daily       No facility-administered encounter medications on file as of 6/9/2017.       IMPRESSION AND PLAN:  A very delightful 76-year-old gentleman with history of non-STEMI in 04/2017, status post successful drug-eluting stent PCI of circumflex, which was the culprit lesion, and he has moderate proximal RCA disease with a negative FFR.  He has 30% left main disease, and LAD, which is a small vessel, has moderate disease with a very small diagonal 2 which has 70%-80% disease.  The latter is best managed medically due to its small size and lack of symptoms.  Normal LVEF.  He has other comorbidities of paroxysmal atrial fibrillation documented on post-discharge event monitor.  Overall, cardiovascular status-wise, the patient is doing well.  He does not appear to have any anginal symptoms or congestive heart failure symptoms.  He is on appropriate CAD medical regimen therapy of dual antiplatelet therapy, high-intensity statin, beta blocker and ACE inhibitor.  LDL and blood pressure are both well controlled.  I had a long discussion with the patient and his wife regarding the pathophysiology of atrial fibrillation, rate versus rhythm control and stroke prophylaxis.  Given CHADS2-VASc score of 4, usually oral anticoagulation is recommended.  I suggested 1 option of using Coumadin and changing aspirin and  Brilinta to Plavix, so in effect, he will be on Coumadin plus Plavix.  The patient tells me that he is very reluctant to start Coumadin, and after a long discussion, the patient declined to be on oral anticoagulation.  He does understand that this increases his risk of having stroke in the absence of oral anticoagulation.  However, the patient tells me that he would like to think more about this option, and if he changes his mind, he will call our clinic, and if he does so, my recommendation would be to change Brilinta to Plavix 75 mg daily and then start Coumadin through INR clinic, and once Coumadin is initiated, aspirin can be discontinued and he can only be on Coumadin plus Plavix.  If he continues to feel well, we can see him back in 6 months' time.  In the interim, if he notices any exertional symptoms, he will call us and we will see him sooner.      1.  CAD with history of non-STEMI in 04/2017.  Status post recent PCI of the culprit circumflex lesion.  He has moderate RCA disease with a negative FFR.  He has moderate LAD disease, and he has diagonal 2 significant disease but the latter is a small vessel and best managed medically in the absence of symptoms and small size.  Clinically, no anginal symptoms.  Normal LVEF.   2.  LDL 33 on high-intensity statin.   3.  Hypertension.  Blood pressure well controlled on lisinopril and beta blocker.   4.  Paroxysmal atrial fibrillation.  Essentially asymptomatic with CHADS2-VASc score of 4.  Oral anticoagulation declined as noted above.      RECOMMENDATIONS:   1.  Continue aspirin plus Brilinta, dual antiplatelet therapy to continue at least for 1 year since the PCI, that is until the end of 04/2018.   2.  Continue high-intensity statin, beta blocker and ACE inhibitor.   3.  I recommended oral anticoagulation with 1 option of using Plavix and Coumadin as noted above.  The patient will call us if he changes his mind, and my recommendation would be then to switch  aspirin and Brilinta to Plavix plus Coumadin, the latter can be initiated through INR Clinic.   4.  Follow up in 6 months, sooner if any change in clinical status.      Forty-five minutes of total time spent, with more than 50% counseling and coordination of care.     Again, thank you for allowing me to participate in the care of your patient.      Sincerely,    Miguel Benites MD     [facility]

## 2017-06-09 NOTE — MR AVS SNAPSHOT
After Visit Summary   6/9/2017    Pablo Fuentes    MRN: [id number]           Patient Information     Date Of Birth          1940        Visit Information        Provider Department      6/9/2017 10:45 AM Miguel Benites MD [facility] [place]        Today's Diagnoses     NSTEMI (non-ST elevated myocardial infarction) (H)           Follow-ups after your visit        Additional Services     Follow-Up with Cardiac Advanced Practice Provider                 Your next 10 appointments already scheduled     Jun 12, 2017 10:00 AM CDT   Treatment 60 with Rh Cardiac Rehab 1   [facility] ([facility])    [address] [place] [address], [address]  [facility] [address]   [phone]            Jun 14, 2017 10:00 AM CDT   Treatment 60 with Rh Cardiac Rehab 1   [facility] ([facility])    [address] [place] [address], [address]  [facility] [address]   [phone]            Jun 16, 2017 10:00 AM CDT   Treatment 60 with Rh Cardiac Rehab 1   [facility] ([facility])    [address] [place] [address], [address]  [facility] [address]   [phone]            Jun 19, 2017 10:00 AM CDT   Treatment 60 with Rh Cardiac Rehab 1   [facility] ([facility])    [address] [place] [address], [address]  [facility] [address]   [phone]            Jun 21, 2017 10:00 AM CDT   Cardiac Treatment with Rh Cardiac Rehab 1   [facility] ([facility])    [address] [place] [address], [address]  [facility] [address]   [phone]              Future tests that were ordered for you today     Open Future Orders        Priority Expected Expires Ordered    Follow-Up with Cardiac Advanced Practice Provider Routine 12/6/2017 6/9/2018 6/9/2017            Who to contact     If you have questions or need follow up information about today's clinic visit or your schedule please  "contact [facility] [place] directly at [phone].  Normal or non-critical lab and imaging results will be communicated to you by MyChart, letter or phone within 4 business days after the clinic has received the results. If you do not hear from us within 7 days, please contact the clinic through MyChart or phone. If you have a critical or abnormal lab result, we will notify you by phone as soon as possible.  Submit refill requests through [employer] or call your pharmacy and they will forward the refill request to us. Please allow 3 business days for your refill to be completed.          Additional Information About Your Visit        [employer] Information     [employer] lets you send messages to your doctor, view your test results, renew your prescriptions, schedule appointments and more. To sign up, go to [email or web address] . Click on \"Log in\" on the left side of the screen, which will take you to the Welcome page. Then click on \"Sign up Now\" on the right side of the page.     You will be asked to enter the access code listed below, as well as some personal information. Please follow the directions to create your username and password.     Your access code is: [email or web address]  Expires: 2017 10:43 AM     Your access code will  in 90 days. If you need help or a new code, please call your [place] clinic or [phone].        Care EveryWhere ID     This is your Care EveryWhere ID. This could be used by other organizations to access your [place] medical records  [id number]        Your Vitals Were     Pulse Height BMI (Body Mass Index)             64 1.778 m (5' 10\") 24.82 kg/m2          Blood Pressure from Last 3 Encounters:   17 124/78   05/10/17 114/72   17 98/76    Weight from Last 3 Encounters:   17 78.5 kg (173 lb)   17 80.7 kg (178 lb)   05/10/17 80.5 kg (177 lb 8 oz)              We Performed the Following     Follow-Up with Cardiologist     "    Primary Care Provider Office Phone # Fax #    Isaac Nunez [phone]       [facility] [address]  [facility] [address]        Thank you!     Thank you for choosing [facility] [place]  for your care. Our goal is always to provide you with excellent care. Hearing back from our patients is one way we can continue to improve our services. Please take a few minutes to complete the written survey that you may receive in the mail after your visit with us. Thank you!             Your Updated Medication List - Protect others around you: Learn how to safely use, store and throw away your medicines at www.disposemymeds.org.          This list is accurate as of: 6/9/17 11:35 AM.  Always use your most recent med list.                   Brand Name Dispense Instructions for use    aspirin 81 MG EC tablet     120 tablet    Take 1 tablet (81 mg) by mouth daily       atorvastatin 40 MG tablet    LIPITOR    30 tablet    Take 1 tablet (40 mg) by mouth daily       BIOTIN MAXIMUM STRENGTH 10 MG Tabs tablet   Generic drug:  Biotin      Take 10,000 mcg by mouth       citalopram 20 MG tablet    celeXA     Take 20 mg by mouth daily       Cranberry 300 MG Tabs      Take 300 mg by mouth       HYDROcodone-acetaminophen 5-325 MG per tablet    NORCO    20 tablet    Take 1-2 tablets by mouth every 4 hours as needed for moderate to severe pain       lisinopril 10 MG tablet    PRINIVIL/ZESTRIL     Take 0.5 tablets (5 mg) by mouth daily       LOPRESSOR PO      Take 25 mg by mouth 2 times daily       LORazepam 0.5 MG tablet    ATIVAN    21 tablet    Take 1 tablet (0.5 mg) by mouth every 8 hours as needed for anxiety       nitroglycerin 0.4 MG sublingual tablet    NITROSTAT    25 tablet    For chest pain place 1 tablet under the tongue every 5 minutes for 3 doses. If symptoms persist 5 minutes after 1st dose call 911.       * OCUVITE PO      Take 1 tablet by mouth        * CENTRUM SILVER per tablet      Take 1 tablet by mouth daily       ticagrelor 90 MG tablet    BRILINTA    60 tablet    Take 1 tablet (90 mg) by mouth every 12 hours       * Notice:  This list has 2 medication(s) that are the same as other medications prescribed for you. Read the directions carefully, and ask your doctor or other care provider to review them with you.

## 2017-06-10 NOTE — PROGRESS NOTES
REASON FOR CARDIOLOGY VISIT:  Followup non-STEMI.      HISTORY OF PRESENT ILLNESS:  Mr. Fuentes is a very pleasant 76-year-old gentleman with history of hypertension who I saw in early 04/2017 when he was admitted with chest pain and was diagnosed with non-ST elevation myocardial infarction.  To be noted, prior to that admission, patient was hospitalized recently for small-bowel obstruction and underwent surgery with lysis of adhesions and did not require any bowel resection.  He has other comorbidities of history of hypertension.  He underwent coronary angiogram that revealed a subtotal hazy lesion in the mid circumflex that was successfully treated with 2.5 x 24 mm everolimus Promus Premier drug-eluting stent.  He was additionally found to have an ostial 30% left main, a small LAD vessel around 2.25 mm with no proximal focal stenosis.  There was 50% mid-LAD stenosis, and second diagonal had about 70%-75% stenosis, but it was a small vessel, less than 2 mm.  The RCA had a proximal 60%-70% lesion with FFR of 0.93.  Echocardiogram showed LVEF of 60% with mild basal lateral hypokinesia.  Mild to moderate LVH.  During the hospitalization post-PCI, he had a brief episode of self-resolving atrial fibrillation.  He was discharged on event monitor for 2 weeks.  Paroxysmal episodes of atrial fibrillation were detected.  Overall, 7% burden of atrial fibrillation.  Remarkably, patient was asymptomatic during those episodes.  He was seen in followup by Lily Jacobo, and oral anticoagulation was discussed and recommended in view of his CHADS2-VASc score of 4.  However, the patient was reluctant to start oral anticoagulation.  Today, he is coming in followup accompanied by his wife.  He is doing cardiac rehabilitation and is feeling quite well.  No chest discomfort or shortness of breath or dizziness or palpitation, presyncope or syncope.  Lipid panel done last month shows LDL quite well controlled at 33.2, HDL 45, total  cholesterol 98, triglycerides 99.  The patient and his wife askes several questions about atrial fibrillation and oral anticoagulation.  We talked at length about CHADS2-VASc score of 4, which gives him a stroke risk of about 4.8%.  We also discussed the risk of bleeding and use HAS-BLED score, which gives bleeding risk of around 4%.  The patient presently is on aspirin and Brilinta.  He is on Lipitor 40 mg daily.  He is on metoprolol tartrate 25 mg b.i.d. and lisinopril 5 mg daily.      PHYSICAL EXAMINATION:   VITAL SIGNS:  Blood pressure 124/78, heart rate 64, regular, weight 173 pounds, BMI 24.87.   GENERAL:  The patient appears pleasant, comfortable.   NECK:  Normal JVP, no bruit.   CARDIOVASCULAR SYSTEM:  S1, S2 normal, no murmur, rub or gallop.   RESPIRATORY SYSTEM:  Clear to auscultation bilaterally.   ABDOMEN:  Soft, nontender.   EXTREMITIES:  No pitting pedal edema.   NEUROLOGICAL:  Alert, oriented x3.   PSYCHIATRIC:  Normal affect.   SKIN:  No obvious rash.   HEENT:  No pallor or icterus.      IMPRESSION AND PLAN:  A very delightful 76-year-old gentleman with history of non-STEMI in 04/2017, status post successful drug-eluting stent PCI of circumflex, which was the culprit lesion, and he has moderate proximal RCA disease with a negative FFR.  He has 30% left main disease, and LAD, which is a small vessel, has moderate disease with a very small diagonal 2 which has 70%-80% disease.  The latter is best managed medically due to its small size and lack of symptoms.  Normal LVEF.  He has other comorbidities of paroxysmal atrial fibrillation documented on post-discharge event monitor.  Overall, cardiovascular status-wise, the patient is doing well.  He does not appear to have any anginal symptoms or congestive heart failure symptoms.  He is on appropriate CAD medical regimen therapy of dual antiplatelet therapy, high-intensity statin, beta blocker and ACE inhibitor.  LDL and blood pressure are both well  controlled.  I had a long discussion with the patient and his wife regarding the pathophysiology of atrial fibrillation, rate versus rhythm control and stroke prophylaxis.  Given CHADS2-VASc score of 4, usually oral anticoagulation is recommended.  I suggested 1 option of using Coumadin and changing aspirin and Brilinta to Plavix, so in effect, he will be on Coumadin plus Plavix.  The patient tells me that he is very reluctant to start Coumadin, and after a long discussion, the patient declined to be on oral anticoagulation.  He does understand that this increases his risk of having stroke in the absence of oral anticoagulation.  However, the patient tells me that he would like to think more about this option, and if he changes his mind, he will call our clinic, and if he does so, my recommendation would be to change Brilinta to Plavix 75 mg daily and then start Coumadin through INR clinic, and once Coumadin is initiated, aspirin can be discontinued and he can only be on Coumadin plus Plavix.  If he continues to feel well, we can see him back in 6 months' time.  In the interim, if he notices any exertional symptoms, he will call us and we will see him sooner.      1.  CAD with history of non-STEMI in 04/2017.  Status post recent PCI of the culprit circumflex lesion.  He has moderate RCA disease with a negative FFR.  He has moderate LAD disease, and he has diagonal 2 significant disease but the latter is a small vessel and best managed medically in the absence of symptoms and small size.  Clinically, no anginal symptoms.  Normal LVEF.   2.  LDL 33 on high-intensity statin.   3.  Hypertension.  Blood pressure well controlled on lisinopril and beta blocker.   4.  Paroxysmal atrial fibrillation.  Essentially asymptomatic with CHADS2-VASc score of 4.  Oral anticoagulation declined as noted above.      RECOMMENDATIONS:   1.  Continue aspirin plus Brilinta, dual antiplatelet therapy to continue at least for 1 year since  the PCI, that is until the end of 2018.   2.  Continue high-intensity statin, beta blocker and ACE inhibitor.   3.  I recommended oral anticoagulation with 1 option of using Plavix and Coumadin as noted above.  The patient will call us if he changes his mind, and my recommendation would be then to switch aspirin and Brilinta to Plavix plus Coumadin, the latter can be initiated through [facility]R Clinic.   4.  Follow up in 6 months, sooner if any change in clinical status.      Forty-five minutes of total time spent, with more than 50% counseling and coordination of care.         LILA TAN MD             D: 2017 11:42   T: 2017 21:50   MT: PARTH      Name:     LUH ZELAYA   MRN:      [id number]        Account:      [id number]   :      1940           Service Date: 2017      Document: [id number]

## 2017-06-12 ENCOUNTER — HOSPITAL ENCOUNTER (OUTPATIENT)
Dept: CARDIAC REHAB | Facility: CLINIC | Age: 77
End: 2017-06-12
Attending: INTERNAL MEDICINE
Payer: MEDICARE

## 2017-06-12 PROCEDURE — 93798 PHYS/QHP OP CAR RHAB W/ECG: CPT | Performed by: OCCUPATIONAL THERAPIST

## 2017-06-12 PROCEDURE — 40000116 ZZH STATISTIC OP CR VISIT: Performed by: OCCUPATIONAL THERAPIST

## 2017-06-12 PROCEDURE — 40000575 ZZH STATISTIC OP CARDIAC VISIT #2: Performed by: OCCUPATIONAL THERAPIST

## 2017-06-13 VITALS — BODY MASS INDEX: 24.62 KG/M2 | WEIGHT: 172 LBS | HEIGHT: 70 IN

## 2017-06-13 ASSESSMENT — 6 MINUTE WALK TEST (6MWT)
GENDER SELECTION: MALE
TOTAL DISTANCE WALKED (FT): 145
MALE CALC: 1665.54
PREDICTED: 1675.69
FEMALE CALC: 1352.28

## 2017-06-13 NOTE — PROGRESS NOTES
" 06/13/17 1000   Session  Pablo Fuentes  1940  NSTEMI/STENT    Physician cosignature/electronic signature indicates approval of this ITP document. I have established, reviewed and made necessary changes to the individualized treatment plan and exercise prescription for this patient.       Session 90 Day Individualized Treatment Plan   Certified through this date 08/05/17   Cardiac Rehab Assessment   Cardiac Rehab Assessment 5/4/17 Progress update done today. PT has been making gradual progress with rehab. He is limited to seated exercises due to his knees. He is tolerating the Nustep well and plan to add the arm ergometer next session. He has attended physical therapy in the past, as he was at [place] post a [facility]. At that time, he was given exercises to continue at home. PT does L/E and U/E cals, bands and weights at home. PT has been having some anxiety at home. He has noticed that he becomes anxious right before taking his shower at night, as this is the time he had his heart attack. He has been taking his \"stress pill\" just prior to showering , and this has helped. He also takes a \"stress pill\" just prior to rehab. He has noted \"twinges\" in the chest area, but feels he is very sensitive to everything going on in his body. He does plan to attend nutrition classes. Skilled therapy needed to progress PT to his goal of 45 minutes of aerobic exercise in order to resume all ADL's.  5/19/17 Patient and his wife both present for 1:1 consultation today. Discussed progression and limitations with patients knee pain. Patient currently uses mostly upper body muscles to engage in exercise. Patient reports this is beneficial for his knees. He is making gradual progression in rehab. Reviewed goals and MET goal today during cosult. Patient's wife reports they are working on dietary changes including cutting salt, butter and fats from diet. He is currently wearing an event monitor for 2 weeks to monitor for Afib. From " what patient has been told, he's been in afib 4% of the time. During rehab sessions, 1 possible bout of afib was noted. Otherwise patient has been in NSR with exercise. Patient is anxious about the possibility of being put on blood thinners in the future. He is currently taking a PRN medication to help with his anxiety. Patient and his wife report it's helping, but would like to discuss being put on a daily medication that can help keep him stable instead of taking a med PRN. 6/13/17 Progress update done today. PT may reaching a plateau, plan is for PT to attend rehab this week and then continue with WEL maintanence program. PT looking forward to continuing with a consistent exercise program. Patient continues to benefit from skilled therapy to monitor CV response to exercise, to educate on risk management and behavior change to achieve patient's goals.    General Information   Treatment Diagnosis NSTEMI   Date of Treatment Diagnosis 04/02/17   Secondary Treatment Diagnosis Stent   Significant Past CV History None   Comorbidities None   Other Medical History small bowel obstruction surgery on 03/24/2017 patient was recovering from surgery and had MI, stomache surgery 2/13/2015.     Lead up symptoms chest pain ; with numbness on left side   H[facility]    Hospital Discharge Date 04/06/17   Signs and Symptoms Post Hospital Discharge Fatigue;Anxiety   Outpatient Cardiac Rehab Start Date 04/13/17   Primary Physician Isaac Nunez   Primary Physician Follow Up Scheduled   Cardiologist Miguel Benites   Cardiologist Follow Up Scheduled   Ejection Fraction 60%    Risk Stratification Low   Summary of Cath Report   Summary of Cath Report Available   Date Performed 04/03/17   LAD no focal stenosis   D2 Less than 2 mm D2 has a   LCX mid stent placed    RCA RCA dominant 60 to 70% calcified proximal lesion. Mid and distal   Living and Work Status    Living Arrangements and Social Status  "house  (PT has grab bars in bathroom)   Support System Live with an adult   Return to Employment Retired   Occupation steel    Preventative Medications   CMS recommended medications Ace inhibitors;Antiplatelets;Beta Blocker;Lipid Lowering;Influenza vaccination;Pneumonia vaccination   Falls Screen   Have you fallen two or more times in the past year? No   Have you fallen and had an injury in the past year? No   Pain   Patient Currently in Pain No   Additional Pain Locations? (PT does have some incisional achiness from his bowel surgery)   Physical Assessments   Incisions WNL  (had to go in both wrists and femoral artery)   Edema None   Right Lung Sounds not assessed   Left Lung Sounds not assessed   Limitations Orthopedic   Comments PT had recent bowel surgery ; has some achiness in incisions .  PT was encouraged to sit up straight and do deep breathing exercises several times per day.     Individualized Treatment Plan   Monitored Sessions Scheduled 24   Monitored Sessions Attended 20   Oxygen   Supplemental Oxygen needed No   Nutrition Management - Weight Management   Assessment Re-assessment   Age 76   Weight 78 kg (172 lb)   Height 1.778 m (5' 10\")   BMI (Calculated) 24.73   Initial Rate Your Plate Score. Dietary tool to assess eating patterns. Scores range from 24 to 72. The higher the score the healthier the eating pattern. 47   Nutrition Management - Lipids   Lipids Labs Available   Date 04/02/17   Total Cholesterol 129   Triglycerides 83   HDL 85   LDL 68   Prescribed Lipid Medication Yes   Statin Intensity High Intensity   Nutrition Management - Diabetes   Diabetes No   Nutrition Management Summary   Dietary Recommendations Low Fat;Low Cholesterol;Low Sodium   Stages of Change for Diet Compliance Action   Interventions Planned Attend Nutrition Education Class(es);Instruct on Label Reading;Educate on Benefits of Exercise   Interventions In Progress or Completed Understands how to accurately read " Food Labels   Nutrition Summary Comments PT has changed his diet he is eating less butter using olive oil. 5/4/17 In the past, PT used butter on everything. He has cut back to 1 pat of butter per day. He has increased fruits and vegetable intake. His wife is making fruit and vegetable smoothies in the  for him. She is also using accent-salt substitute. They plan to attend nutrition classes.  5/19/17 Patient is increasing fruit and vegetable intake, reducing amount of butter and trying salt supplements. Patient's wife is interested in attending nutrition class, but patient struggles to sit in a chair for 1 hour due to pain. Patient may bring a pillow from home so he is able to attend class.    Nutrition Target Outcome Weight loss .5-1 lb/week (if BMI > 25)   Psychosocial Management   Psychosocial Assessment Re-assessment   Is there history of clinical depression or increased risk of depression? No previous history   Current Level of Stress per Patient Report Moderate   (due to recent MI)   Current Coping Skills Is on Medication for Depression/Anxiety   Initial Patient Health Questionnaire -9 Score (PHQ-9) for depression. 5-9 Minimal symptoms, 10-14 Minor depression, 15-19 Major depression, moderately severe, > 20 Major depression, severe  4   Initial [facility] Survey score.  Quality of Life:   If total score > 25 review individual areas where patient rated a 4 or 5.  Consider patients current medical condition and what role that plays on the score.   Adjust treatment protocol to improve areas of concern.  Consider the following:  PHQ9 score, DASI, and re-assessment within the next 30 days to assist with developing treatments.  20   Stages of Change Action   Interventions Planned Patient to verbalize understanding of negative impact of stress to personal health   Interventions In Progress or Completed Patient verbalizes understanding of negative impact of stress to personal health;Patient is able to  "identify positive support system   Psychosocial Comments PT has been taking anti-anxiety medications when he feels stressed. He has recognized that he has anxiety at night just before taking a shower. He had his MI while in the shower. He now is trying to change his routine up a bit, and taking a \"stress pill\" to relieve anxiety 5/19/17 Patient reports the PRN medication for anxiety is helping, but will discuss with primary MD next week about being put on a medication for daily use. Discussed deep breathing and relaxation with techniques with patient today.   Psychosocial Target Outcome Maximize coping skills   Other Core Components - Hypertension   History of or Diagnosis of Hypertension Yes   Currently taking Anti-Hypertensives Yes;Beta blocker;Ace Inhibitor   Hypertension Comments 5/19/17 Patient had an elevated resting BP 1 rehab session. Otherwise BP has been WNLs   Other Core Components - Tobacco   History of Tobacco Use Yes   Quit Date or Planned Quit Date 01/01/65   Tobacco Use Status Former (Quit > 6 mo ago)   Tobacco Habit Cigarettes   Stages of Change Maintenance   Other Core Components Summary   Interventions Planned Educate on importance of maintaining low sodium diet   Interventions In Progress or Completed Educated on importance of maintaining low sodium diet   Activity/Exercise History   Activity/Exercise Assessment Re-assessment   Activity/Exercise Status prior to event? Sedentary   Number of Days Currently participating in Moderate Physical Activity? 4   Number of Days Currently performing  Aerobic Exercise (including rehab)? 2 to 3  (rehab)   Number of Minutes per Session Currently of Aerobic Exercise (average)? 30   Current Stage of Change (Physical Activity) Preparation   Current Stage of Change (Aerobic Exercise) Preparation   Patient Goals Goal #1   Goal #1 Description PT to return to his previous chair exercise up to 45 min 2-3 days per week without symptoms.     Goal #1 Target Date 06/16/17 "   Goal #1 Progress Towards Goal 5/4/17 PT has been starting to do cals, bands and light weights at home. He did 60 to 70 reps of each prior to MI. He has started with 10 to 15 reps. Prior to MI, PT did about 1 hour of exercises daily.  5/19/17 Patient is feeling challenged in rehab and needs rest days inbetween exercise to recover. Patient does weights and low impact cals at home, but has not started aerobic exercise.   Activity/Exercise Comments PT is signficantly limited by knee discomfort he needs bilateral knee replacements.  PT walks very little using wheeled walked extremely low endurance for walking exercise   Activity/Exercise Target Outcome An Accumulation of 150  Minutes of Aerobic Activity per Week   Exercise Assessment   6 Minute Walk Predicted - Gender Selection Male   6 Minute Walk Predicted (Male) 1665.54   6 Minute Walk Predicted (Female) 1352.28   Initial 6 Minute Walk Distance (Feet) 145 ft   Resting HR 61 bpm   Exercise HR 83 bpm   Post Exercise HR 65 bpm   Resting /68   Exercise /76   Post Exercise /62   Effort Rating 5   Current MET Level 3.0   MET Level Goal 3 -4   ECG Rhythm Normal sinus rhythm   Ectopy PVCs   Current Symptoms Fatigue;Joint pain   Limitations/Restrictions Orthopedic (see comments);Other (see comments)  (radial and femoral angios; recent bowel surgery one week junior)   Exercise Prescription   Mode Nustep;Arm Ergometer;Weights   Duration/Time Intermittent bouts   Frequency 3 daysweek   THR (85% of age predicted max HR) 120.7   OMNI Effort Rating (0-10 Scale) 4-6/10   Progression Intermittent bouts;Total exercise time of 20-30 minutes;Aerobic exercise to OMNI rating of 6 or below and at or below THR;Progress peak intensity by 1/4 MET per week   Comments PT will most likely be limited by his knees will do non weight bearing exercises    Recommended Home Exercise   Type of Exercise Weights;Low Impact Calisthenics;LE strengthening program;Other (comments)  (chair  exercises)   Frequency (days per week) 2-3   Duration (minutes per session) 15-30 min   Effort Rating Recommended 4-6/10   Current Home Exercise   Type of Exercise Low Impact Calisthenics;LE Strengthening Program   Frequency (days per week) 2-3   Duration (minutes per session) 10-15   Follow-up/On-going Support   Provider follow-up needed on the following No follow-up needed   Learning Assessment   Learner Patient   Primary Language English   Preferred Learning Style Listening;Reading   Barriers to Learning No barriers noted   Patient Education   Education recommended Anatomy and Physiology of the Heart;Blood Pressure;Exercise Principles;Medication Overview;Muscle Conditioning;Nutrition;Stress Management;Weight Loss

## 2017-06-14 ENCOUNTER — HOSPITAL ENCOUNTER (OUTPATIENT)
Dept: CARDIAC REHAB | Facility: CLINIC | Age: 77
End: 2017-06-14
Attending: INTERNAL MEDICINE
Payer: MEDICARE

## 2017-06-14 PROCEDURE — 40000116 ZZH STATISTIC OP CR VISIT: Performed by: OCCUPATIONAL THERAPIST

## 2017-06-14 PROCEDURE — 93798 PHYS/QHP OP CAR RHAB W/ECG: CPT | Performed by: OCCUPATIONAL THERAPIST

## 2017-06-16 ENCOUNTER — HOSPITAL ENCOUNTER (OUTPATIENT)
Dept: CARDIAC REHAB | Facility: CLINIC | Age: 77
End: 2017-06-16
Attending: INTERNAL MEDICINE
Payer: MEDICARE

## 2017-06-16 PROCEDURE — 93798 PHYS/QHP OP CAR RHAB W/ECG: CPT | Performed by: OCCUPATIONAL THERAPIST

## 2017-06-16 PROCEDURE — 40000116 ZZH STATISTIC OP CR VISIT: Performed by: OCCUPATIONAL THERAPIST

## 2017-06-19 ENCOUNTER — HOSPITAL ENCOUNTER (OUTPATIENT)
Dept: CARDIAC REHAB | Facility: CLINIC | Age: 77
End: 2017-06-19
Attending: INTERNAL MEDICINE
Payer: MEDICARE

## 2017-06-19 PROCEDURE — 40000116 ZZH STATISTIC OP CR VISIT: Performed by: REHABILITATION PRACTITIONER

## 2017-06-19 PROCEDURE — 93798 PHYS/QHP OP CAR RHAB W/ECG: CPT | Performed by: REHABILITATION PRACTITIONER

## 2017-06-21 ENCOUNTER — HOSPITAL ENCOUNTER (OUTPATIENT)
Dept: CARDIAC REHAB | Facility: CLINIC | Age: 77
End: 2017-06-21
Attending: INTERNAL MEDICINE
Payer: MEDICARE

## 2017-06-21 VITALS — BODY MASS INDEX: 24.62 KG/M2 | HEIGHT: 70 IN | WEIGHT: 172 LBS

## 2017-06-21 PROCEDURE — 40000116 ZZH STATISTIC OP CR VISIT: Performed by: REHABILITATION PRACTITIONER

## 2017-06-21 PROCEDURE — 93798 PHYS/QHP OP CAR RHAB W/ECG: CPT | Performed by: REHABILITATION PRACTITIONER

## 2017-06-21 PROCEDURE — 93797 PHYS/QHP OP CAR RHAB WO ECG: CPT | Mod: 59 | Performed by: REHABILITATION PRACTITIONER

## 2017-06-21 PROCEDURE — 40000575 ZZH STATISTIC OP CARDIAC VISIT #2: Performed by: REHABILITATION PRACTITIONER

## 2017-06-21 ASSESSMENT — 6 MINUTE WALK TEST (6MWT)
TOTAL DISTANCE WALKED (FT): 145
GENDER SELECTION: MALE
PREDICTED: 1675.69
MALE CALC: 1665.54
FEMALE CALC: 1352.28

## 2017-06-21 NOTE — PROGRESS NOTES
"Pablo MARRERO Vencil  1940  76 year old  NSTEMI/STENT 06/21/17 1100   Physician cosignature/electronic signature indicates agreements with the ITP document and approval of discharge.    Session   Session Discharge Note   Certified through this date 08/05/17   Cardiac Rehab Assessment   Cardiac Rehab Assessment 5/4/17 Progress update done today. PT has been making gradual progress with rehab. He is limited to seated exercises due to his knees. He is tolerating the Nustep well and plan to add the arm ergometer next session. He has attended physical therapy in the past, as he was at [place] [facility]. At that time, he was given exercises to continue at home. PT does L/E and U/E cals, bands and weights at home. PT has been having some anxiety at home. He has noticed that he becomes anxious right before taking his shower at night, as this is the time he had his heart attack. He has been taking his \"stress pill\" just prior to showering , and this has helped. He also takes a \"stress pill\" just prior to rehab. He has noted \"twinges\" in the chest area, but feels he is very sensitive to everything going on in his body. He does plan to attend nutrition classes. Skilled therapy needed to progress PT to his goal of 45 minutes of aerobic exercise in order to resume all ADL's.  5/19/17 Patient and his wife both present for 1:1 consultation today. Discussed progression and limitations with patients knee pain. Patient currently uses mostly upper body muscles to engage in exercise. Patient reports this is beneficial for his knees. He is making gradual progression in rehab. Reviewed goals and MET goal today during cosult. Patient's wife reports they are working on dietary changes including cutting salt, butter and fats from diet. He is currently wearing an event monitor for 2 weeks to monitor for Afib. From what patient has been told, he's been in afib 4% of the time. During rehab sessions, 1 possible bout of afib was noted. " Otherwise patient has been in NSR with exercise. Patient is anxious about the possibility of being put on blood thinners in the future. He is currently taking a PRN medication to help with his anxiety. Patient and his wife report it's helping, but would like to discuss being put on a daily medication that can help keep him stable instead of taking a med PRN. 6/13/17 Progress update done today. PT may reaching a plateau, plan is for PT to attend rehab this week and then [facility]. PT looking forward to continuing with a consistent exercise program. Patient continues to benefit from skilled therapy to monitor CV response to exercise, to educate on risk management and behavior change to achieve patient's goals. Pt made significant gains in exercise tolerance. Initially patient tolerated intermittent aerobic exercise but is now able to achieve 30-35 minutes of aerobic exercise utilizing the nustep and arm ergometer. He did not complete the six minute walk test today due to knee discomfort and orthopedic limitations. He lost 10# throughout rehab by making heart healthy diet changes and increasing his activity level. He has returned to doing resistance band exercise at home and will plan to continue to exercise in the [facility] program at [facility] 3-4 days per week.      General Information   Treatment Diagnosis NSTEMI   Date of Treatment Diagnosis 04/02/17   Secondary Treatment Diagnosis Stent   Significant Past CV History None   Comorbidities None   Other Medical History small bowel obstruction surgery on 03/24/2017 patient was recovering from surgery and had MI, stomache surgery 2/13/2015.     Lead up symptoms chest pain ; with numbness on left side   Hospital Location [facility]    Hospital Discharge Date 04/06/17   Signs and Symptoms Post Hospital Discharge Fatigue;Anxiety   Outpatient Cardiac Rehab Start Date 04/13/17   Primary Physician Isaac Nunez   Primary Physician  "Follow Up Completed   Cardiologist Miguel Benites   Cardiologist Follow Up Completed   Ejection Fraction 60%    Risk Stratification Low   Summary of Cath Report   Summary of Cath Report Available   Date Performed 04/03/17   LAD no focal stenosis   D2 Less than 2 mm D2 has a   LCX mid stent placed    RCA RCA dominant 60 to 70% calcified proximal lesion. Mid and distal   Living and Work Status    Living Arrangements and Social Status house  (PT has grab bars in bathroom)   Support System Live with an adult   Return to Employment Retired   Occupation steel    Preventative Medications   CMS recommended medications Ace inhibitors;Antiplatelets;Beta Blocker;Lipid Lowering;Influenza vaccination;Pneumonia vaccination   Falls Screen   Have you fallen two or more times in the past year? No   Have you fallen and had an injury in the past year? No   Pain   Patient Currently in Pain No   Additional Pain Locations? (PT does have some incisional achiness from his bowel surgery)   Physical Assessments   Incisions WNL  (had to go in both wrists and femoral artery)   Edema None   Right Lung Sounds not assessed   Left Lung Sounds not assessed   Limitations Orthopedic   Comments PT had recent bowel surgery ; has some achiness in incisions .  PT was encouraged to sit up straight and do deep breathing exercises several times per day.     Individualized Treatment Plan   Monitored Sessions Scheduled 24   Monitored Sessions Attended 24   Oxygen   Supplemental Oxygen needed No   Nutrition Management - Weight Management   Assessment Re-assessment   Age 76   Weight 78 kg (172 lb)   Height 1.778 m (5' 10\")   BMI (Calculated) 24.73   Initial Rate Your Plate Score. Dietary tool to assess eating patterns. Scores range from 24 to 72. The higher the score the healthier the eating pattern. 47   Discharge Rate Your Plate Score 61   Weight Management Comments 6/21 Pt lost 10# throughout cardiac rehab from healthy diet changes and increased " exercise.    Nutrition Management - Lipids   Lipids Labs Available   Date 05/25/17   Total Cholesterol 98   Triglycerides 99   HDL 45   LDL 33   Prescribed Lipid Medication Yes   Statin Intensity High Intensity   Lipid Comments 6/21 Reviewed lipid panel with Pt and his wife.    Nutrition Management - Diabetes   Diabetes No   Nutrition Management Summary   Dietary Recommendations Low Fat;Low Cholesterol;Low Sodium   Stages of Change for Diet Compliance Action   Interventions Planned Attend Nutrition Education Class(es);Instruct on Label Reading;Educate on Benefits of Exercise   Interventions In Progress or Completed Understands how to accurately read Food Labels;Understands Weight Management Principles;Educated on Benefits of Exercise   Nutrition Summary Comments PT has changed his diet he is eating less butter using olive oil. 5/4/17 In the past, PT used butter on everything. He has cut back to 1 pat of butter per day. He has increased fruits and vegetable intake. His wife is making fruit and vegetable smoothies in the  for him. She is also using accent-salt substitute. They plan to attend nutrition classes.  5/19/17 Patient is increasing fruit and vegetable intake, reducing amount of butter and trying salt supplements. Patient's wife is interested in attending nutrition class, but patient struggles to sit in a chair for 1 hour due to pain. Patient may bring a pillow from home so he is able to attend class.    Nutrition Target Outcome Weight loss .5-1 lb/week (if BMI > 25)   Psychosocial Management   Psychosocial Assessment Re-assessment   Is there history of clinical depression or increased risk of depression? No previous history   Current Level of Stress per Patient Report Moderate   (due to recent MI)   Current Coping Skills Is on Medication for Depression/Anxiety   Initial Patient Health Questionnaire -9 Score (PHQ-9) for depression. 5-9 Minimal symptoms, 10-14 Minor depression, 15-19 Major depression,  "moderately severe, > 20 Major depression, severe  4   Discharge PHQ-9 Score for Depression 3   [facility].  Quality of Life:   If total score > 25 review individual areas where patient rated a 4 or 5.  Consider patients current medical condition and what role that plays on the score.   Adjust treatment protocol to improve areas of concern.  Consider the following:  PHQ9 score, DASI, and re-assessment within the next 30 days to assist with developing treatments.  20   Discharge [facility] COOP Survey Score 18   Stages of Change Action   Interventions Planned Patient to verbalize understanding of negative impact of stress to personal health   Interventions In Progress or Completed Patient verbalizes understanding of negative impact of stress to personal health;Patient is able to identify positive support system;Patient verbalizes understanding of behavioral assessment scores;Patient was given resources for stress relaxation   Psychosocial Comments PT has been taking anti-anxiety medications when he feels stressed. He has recognized that he has anxiety at night just before taking a shower. He had his MI while in the shower. He now is trying to change his routine up a bit, and taking a \"stress pill\" to relieve anxiety 5/19/17 Patient reports the PRN medication for anxiety is helping, but will discuss with primary MD next week about being put on a medication for daily use. Discussed deep breathing and relaxation with techniques with patient today. 6/21 Pt is utilizing techniques given to him last update.    Psychosocial Target Outcome Maximize coping skills   Other Core Components - Hypertension   History of or Diagnosis of Hypertension Yes   Currently taking Anti-Hypertensives Yes;Beta blocker;Ace Inhibitor   Hypertension Comments 5/19/17 Patient had an elevated resting BP 1 rehab session. Otherwise BP has been WNLs   Other Core Components - Tobacco   History of Tobacco Use Yes   Quit Date or " Planned Quit Date 01/01/65   Tobacco Use Status Former (Quit > 6 mo ago)   Tobacco Habit Cigarettes   Stages of Change Maintenance   Other Core Components Summary   Interventions Planned Educate on importance of maintaining low sodium diet   Interventions In Progress or Completed Educated on importance of maintaining low sodium diet   Activity/Exercise History   Activity/Exercise Assessment Re-assessment   Activity/Exercise Status prior to event? Sedentary   Number of Days Currently participating in Moderate Physical Activity? 4   Number of Days Currently performing  Aerobic Exercise (including rehab)? 2 to 3  (rehab)   Number of Minutes per Session Currently of Aerobic Exercise (average)? 30   Current Stage of Change (Physical Activity) Preparation   Current Stage of Change (Aerobic Exercise) Action   Patient Goals Goal #1   Goal #1 Description PT to return to his previous chair exercise up to 45 min 2-3 days per week without symptoms.     Goal #1 Target Date 06/16/17   Goal #1 Date Met 06/21/17   Goal #1 Progress Towards Goal 5/4/17 PT has been starting to do cals, bands and light weights at home. He did 60 to 70 reps of each prior to MI. He has started with 10 to 15 reps. Prior to MI, PT did about 1 hour of exercises daily.  5/19/17 Patient is feeling challenged in rehab and needs rest days inbetween exercise to recover. Patient does weights and low impact cals at home, but has not started aerobic exercise. 6/21 Pt is able to do his resistance band exercise program at home now without symptoms. He plans to continue his aerobic exercise routine in the W[facility] program at [facility] 3-4 days per week achieving 150 minutes per week of aerobic exercise.    Activity/Exercise Comments PT is signficantly limited by knee discomfort he needs bilateral knee replacements.  PT walks very little using wheeled walked extremely low endurance for walking exercise   Activity/Exercise Target Outcome An Accumulation of 150  Minutes  of Aerobic Activity per Week   Exercise Assessment   6 Minute Walk Predicted - Gender Selection Male   6 Minute Walk Predicted (Male) 1665.54   6 Minute Walk Predicted (Female) 1352.28   Initial 6 Minute Walk Distance (Feet) 145 ft   Resting HR 68 bpm   Exercise HR 84 bpm   Post Exercise HR 78 bpm   Resting /62   Exercise /64   Post Exercise /62   Effort Rating 5   Current MET Level 3.0   MET Level Goal 3 -4   ECG Rhythm Normal sinus rhythm;First degree AV block   Ectopy PVCs   Current Symptoms Fatigue;Joint pain   Limitations/Restrictions Orthopedic (see comments);Other (see comments)  (radial and femoral angios; recent bowel surgery one week junior)   Exercise Prescription   Mode Nustep;Arm Ergometer;Weights   Duration/Time 30-45 min   Frequency 3 daysweek   THR (85% of age predicted max HR) 120.7   OMNI Effort Rating (0-10 Scale) 4-6/10   Progression Aerobic exercise to OMNI rating of 6 or below and at or below THR;Progress peak intensity by 1/4 MET per week;Total exercise time of 30-45 minutes   Comments PT will most likely be limited by his knees will do non weight bearing exercises    Recommended Home Exercise   Type of Exercise Weights;Low Impact Calisthenics;LE strengthening program;Other (comments)  (chair exercises)   Frequency (days per week) 2-3   Duration (minutes per session) Intermittent   Effort Rating Recommended 4-6/10   30 Day Exercise Plan 6/21 Pt will continue resistance band program at home and continue aerobic exercise program in the WEL program at [facility] doing 150 minutes of aerobic exercise a week.    Current Home Exercise   Type of Exercise Low Impact Calisthenics;LE Strengthening Program   Frequency (days per week) 2-3   Duration (minutes per session) 10-15   Follow-up/On-going Support   Provider follow-up needed on the following No follow-up needed   Learning Assessment   Learner Patient   Primary Language English   Preferred Learning Style Listening;Reading    Barriers to Learning No barriers noted   Patient Education   Education recommended Anatomy and Physiology of the Heart;Blood Pressure;Exercise Principles;Medication Overview;Muscle Conditioning;Nutrition;Stress Management;Weight Loss

## 2017-09-12 ENCOUNTER — HOSPITAL ENCOUNTER (OUTPATIENT)
Facility: CLINIC | Age: 77
Setting detail: OBSERVATION
Discharge: HOME | End: 2017-09-13
Attending: EMERGENCY MEDICINE | Admitting: HOSPITALIST
Payer: MEDICARE

## 2017-09-12 ENCOUNTER — APPOINTMENT (OUTPATIENT)
Dept: GENERAL RADIOLOGY | Facility: CLINIC | Age: 77
End: 2017-09-12
Attending: EMERGENCY MEDICINE
Payer: MEDICARE

## 2017-09-12 DIAGNOSIS — R07.9 CHEST PAIN, UNSPECIFIED TYPE: Primary | ICD-10-CM

## 2017-09-12 LAB
ANION GAP SERPL CALCULATED.3IONS-SCNC: 5 MMOL/L (ref 3–14)
APTT PPP: 27 SEC (ref 22–37)
BASOPHILS # BLD AUTO: 0 10E9/L (ref 0–0.2)
BASOPHILS NFR BLD AUTO: 0.7 %
BUN SERPL-MCNC: 22 MG/DL (ref 7–30)
CALCIUM SERPL-MCNC: 8.7 MG/DL (ref 8.5–10.1)
CHLORIDE SERPL-SCNC: 107 MMOL/L (ref 94–109)
CO2 SERPL-SCNC: 27 MMOL/L (ref 20–32)
CREAT SERPL-MCNC: 0.9 MG/DL (ref 0.66–1.25)
DIFFERENTIAL METHOD BLD: ABNORMAL
EOSINOPHIL # BLD AUTO: 0.1 10E9/L (ref 0–0.7)
EOSINOPHIL NFR BLD AUTO: 1.9 %
ERYTHROCYTE [DISTWIDTH] IN BLOOD BY AUTOMATED COUNT: 12.4 % (ref 10–15)
GFR SERPL CREATININE-BSD FRML MDRD: 82 ML/MIN/1.7M2
GLUCOSE SERPL-MCNC: 100 MG/DL (ref 70–99)
HCT VFR BLD AUTO: 43.7 % (ref 40–53)
HGB BLD-MCNC: 15.1 G/DL (ref 13.3–17.7)
IMM GRANULOCYTES # BLD: 0 10E9/L (ref 0–0.4)
IMM GRANULOCYTES NFR BLD: 0.5 %
INR PPP: 0.95 (ref 0.86–1.14)
INTERPRETATION ECG - MUSE: NORMAL
LYMPHOCYTES # BLD AUTO: 1.5 10E9/L (ref 0.8–5.3)
LYMPHOCYTES NFR BLD AUTO: 26.9 %
MCH RBC QN AUTO: 34.3 PG (ref 26.5–33)
MCHC RBC AUTO-ENTMCNC: 34.6 G/DL (ref 31.5–36.5)
MCV RBC AUTO: 99 FL (ref 78–100)
MONOCYTES # BLD AUTO: 0.6 10E9/L (ref 0–1.3)
MONOCYTES NFR BLD AUTO: 9.6 %
NEUTROPHILS # BLD AUTO: 3.5 10E9/L (ref 1.6–8.3)
NEUTROPHILS NFR BLD AUTO: 60.4 %
NRBC # BLD AUTO: 0 10*3/UL
NRBC BLD AUTO-RTO: 0 /100
NT-PROBNP SERPL-MCNC: 590 PG/ML (ref 0–1800)
PLATELET # BLD AUTO: 149 10E9/L (ref 150–450)
POTASSIUM SERPL-SCNC: 4.2 MMOL/L (ref 3.4–5.3)
RBC # BLD AUTO: 4.4 10E12/L (ref 4.4–5.9)
SODIUM SERPL-SCNC: 139 MMOL/L (ref 133–144)
TROPONIN I SERPL-MCNC: <0.015 UG/L (ref 0–0.04)
WBC # BLD AUTO: 5.7 10E9/L (ref 4–11)

## 2017-09-12 PROCEDURE — 99285 EMERGENCY DEPT VISIT HI MDM: CPT | Mod: 25

## 2017-09-12 PROCEDURE — 85610 PROTHROMBIN TIME: CPT | Performed by: EMERGENCY MEDICINE

## 2017-09-12 PROCEDURE — 83880 ASSAY OF NATRIURETIC PEPTIDE: CPT | Performed by: EMERGENCY MEDICINE

## 2017-09-12 PROCEDURE — G0378 HOSPITAL OBSERVATION PER HR: HCPCS

## 2017-09-12 PROCEDURE — 84484 ASSAY OF TROPONIN QUANT: CPT | Performed by: EMERGENCY MEDICINE

## 2017-09-12 PROCEDURE — 84484 ASSAY OF TROPONIN QUANT: CPT | Performed by: PHYSICIAN ASSISTANT

## 2017-09-12 PROCEDURE — 85025 COMPLETE CBC W/AUTO DIFF WBC: CPT | Performed by: EMERGENCY MEDICINE

## 2017-09-12 PROCEDURE — 25000132 ZZH RX MED GY IP 250 OP 250 PS 637: Mod: GY | Performed by: PHYSICIAN ASSISTANT

## 2017-09-12 PROCEDURE — 93005 ELECTROCARDIOGRAM TRACING: CPT

## 2017-09-12 PROCEDURE — 71020 XR CHEST 2 VW: CPT

## 2017-09-12 PROCEDURE — 36415 COLL VENOUS BLD VENIPUNCTURE: CPT | Performed by: PHYSICIAN ASSISTANT

## 2017-09-12 PROCEDURE — 93005 ELECTROCARDIOGRAM TRACING: CPT | Mod: 76

## 2017-09-12 PROCEDURE — 80048 BASIC METABOLIC PNL TOTAL CA: CPT | Performed by: EMERGENCY MEDICINE

## 2017-09-12 PROCEDURE — 99207 ZZC CDG-HISTORY COMP: MEETS EXP. PROB FOCUSED- DOWN CODED LACK OF PFSH: CPT | Performed by: PHYSICIAN ASSISTANT

## 2017-09-12 PROCEDURE — 85730 THROMBOPLASTIN TIME PARTIAL: CPT | Performed by: EMERGENCY MEDICINE

## 2017-09-12 PROCEDURE — 99218 ZZC INITIAL OBSERVATION CARE,LEVL I: CPT | Performed by: PHYSICIAN ASSISTANT

## 2017-09-12 PROCEDURE — A9270 NON-COVERED ITEM OR SERVICE: HCPCS | Mod: GY | Performed by: PHYSICIAN ASSISTANT

## 2017-09-12 RX ORDER — ALUMINA, MAGNESIA, AND SIMETHICONE 2400; 2400; 240 MG/30ML; MG/30ML; MG/30ML
15-30 SUSPENSION ORAL EVERY 4 HOURS PRN
Status: DISCONTINUED | OUTPATIENT
Start: 2017-09-12 | End: 2017-09-13 | Stop reason: HOSPADM

## 2017-09-12 RX ORDER — MORPHINE SULFATE 2 MG/ML
1-2 INJECTION, SOLUTION INTRAMUSCULAR; INTRAVENOUS
Status: DISCONTINUED | OUTPATIENT
Start: 2017-09-12 | End: 2017-09-13 | Stop reason: HOSPADM

## 2017-09-12 RX ORDER — LIDOCAINE 40 MG/G
CREAM TOPICAL
Status: DISCONTINUED | OUTPATIENT
Start: 2017-09-12 | End: 2017-09-13 | Stop reason: HOSPADM

## 2017-09-12 RX ORDER — CYCLOBENZAPRINE HCL 10 MG
10 TABLET ORAL 3 TIMES DAILY PRN
COMMUNITY
End: 2018-09-10

## 2017-09-12 RX ORDER — CITALOPRAM HYDROBROMIDE 20 MG/1
20 TABLET ORAL DAILY
Status: DISCONTINUED | OUTPATIENT
Start: 2017-09-13 | End: 2017-09-13 | Stop reason: HOSPADM

## 2017-09-12 RX ORDER — LORAZEPAM 0.5 MG/1
0.5 TABLET ORAL EVERY 8 HOURS PRN
Status: DISCONTINUED | OUTPATIENT
Start: 2017-09-12 | End: 2017-09-13 | Stop reason: HOSPADM

## 2017-09-12 RX ORDER — ACETAMINOPHEN 325 MG/1
650 TABLET ORAL EVERY 4 HOURS PRN
Status: DISCONTINUED | OUTPATIENT
Start: 2017-09-12 | End: 2017-09-13 | Stop reason: HOSPADM

## 2017-09-12 RX ORDER — NALOXONE HYDROCHLORIDE 0.4 MG/ML
.1-.4 INJECTION, SOLUTION INTRAMUSCULAR; INTRAVENOUS; SUBCUTANEOUS
Status: DISCONTINUED | OUTPATIENT
Start: 2017-09-12 | End: 2017-09-13 | Stop reason: HOSPADM

## 2017-09-12 RX ORDER — ASPIRIN 81 MG/1
81 TABLET ORAL DAILY
Status: DISCONTINUED | OUTPATIENT
Start: 2017-09-13 | End: 2017-09-13 | Stop reason: HOSPADM

## 2017-09-12 RX ORDER — CYCLOBENZAPRINE HCL 10 MG
10 TABLET ORAL 3 TIMES DAILY PRN
Status: DISCONTINUED | OUTPATIENT
Start: 2017-09-12 | End: 2017-09-13 | Stop reason: HOSPADM

## 2017-09-12 RX ORDER — ATORVASTATIN CALCIUM 40 MG/1
40 TABLET, FILM COATED ORAL DAILY
Status: DISCONTINUED | OUTPATIENT
Start: 2017-09-13 | End: 2017-09-13 | Stop reason: HOSPADM

## 2017-09-12 RX ORDER — HYDROCODONE BITARTRATE AND ACETAMINOPHEN 5; 325 MG/1; MG/1
1-2 TABLET ORAL EVERY 4 HOURS PRN
Status: DISCONTINUED | OUTPATIENT
Start: 2017-09-12 | End: 2017-09-13 | Stop reason: HOSPADM

## 2017-09-12 RX ORDER — LISINOPRIL 5 MG/1
5 TABLET ORAL DAILY
Status: DISCONTINUED | OUTPATIENT
Start: 2017-09-13 | End: 2017-09-13 | Stop reason: HOSPADM

## 2017-09-12 RX ORDER — HYDRALAZINE HYDROCHLORIDE 20 MG/ML
10 INJECTION INTRAMUSCULAR; INTRAVENOUS EVERY 4 HOURS PRN
Status: DISCONTINUED | OUTPATIENT
Start: 2017-09-12 | End: 2017-09-13 | Stop reason: HOSPADM

## 2017-09-12 RX ORDER — NITROGLYCERIN 0.4 MG/1
0.4 TABLET SUBLINGUAL EVERY 5 MIN PRN
Status: DISCONTINUED | OUTPATIENT
Start: 2017-09-12 | End: 2017-09-13 | Stop reason: HOSPADM

## 2017-09-12 RX ORDER — METOPROLOL TARTRATE 25 MG/1
25 TABLET, FILM COATED ORAL 2 TIMES DAILY
Status: DISCONTINUED | OUTPATIENT
Start: 2017-09-12 | End: 2017-09-13 | Stop reason: HOSPADM

## 2017-09-12 RX ADMIN — METOPROLOL TARTRATE 25 MG: 25 TABLET ORAL at 19:55

## 2017-09-12 RX ADMIN — TICAGRELOR 90 MG: 90 TABLET ORAL at 19:57

## 2017-09-12 ASSESSMENT — ENCOUNTER SYMPTOMS
SHORTNESS OF BREATH: 0
DIZZINESS: 0

## 2017-09-12 NOTE — PLAN OF CARE
Problem: Discharge Planning  Goal: Discharge Planning (Adult, OB, Behavioral, Peds)  Outcome: No Change  ROOM # 213-1     Living Situation (if not independent, order SW consult):home with wife.   Facility name:  : Wife.      Activity level at baseline: IND with walker  Activity level on admit: IND with walker         Patient registered to observation; given Patient Bill of Rights; given the opportunity to ask questions about observation status and their plan of care.  Patient has been oriented to the observation room, bathroom and call light is in place.     Discussed discharge goals and expectations with patient/family.

## 2017-09-12 NOTE — IP AVS SNAPSHOT
[facility] Observation Department    [address]    [facility] [address]    Phone:  [phone]                                       After Visit Summary   9/12/2017    Pablo Fuentes    MRN: [id number]           After Visit Summary Signature Page     I have received my discharge instructions, and my questions have been answered. I have discussed any challenges I see with this plan with the nurse or doctor.    ..........................................................................................................................................  Patient/Patient Representative Signature      ..........................................................................................................................................  Patient Representative Print Name and Relationship to Patient    ..................................................               ................................................  Date                                            Time    ..........................................................................................................................................  Reviewed by Signature/Title    ...................................................              ..............................................  Date                                                            Time

## 2017-09-12 NOTE — PLAN OF CARE
Problem: Discharge Planning  Goal: Discharge Planning (Adult, OB, Behavioral, Peds)  Outcome: No Change  PRIMARY DIAGNOSIS: CHEST PAIN  OUTPATIENT/OBSERVATION GOALS TO BE MET BEFORE DISCHARGE:  1. Ruled out acute coronary syndrome (negative or stable Troponin):  Yes  2. Pain Status: Pain free.  3. Appropriate provocative testing performed: No  - Stress Test Procedure: [place]  - Interpretation of cardiac rhythm per telemetry tech: Not placed  Yet. Awaiting monitor      4. Cleared by Consultants (if applicable):No  5. Return to near baseline physical activity: Yes  Discharge Planner Nurse   Safe discharge environment identified: Yes  Barriers to discharge: No       Entered by: Erin Torres 09/12/2017 4:47 PM     Please review provider order for any additional goals.   Nurse to notify provider when observation goals have been met and patient is ready for discharge.

## 2017-09-12 NOTE — PROGRESS NOTES
Patient called nurse and stated he had a sharp pain in his left chest . Patient at the time had his left arm extended straight holding his wifes hand and when he brought his arm back in to his side, he felt the left chest  pain. Patient said it was a quick sharp pain. Tele tech called and patient is NSR at 67. Patient is not SOB, patient only had pain for a second. Will continue to monitor.

## 2017-09-12 NOTE — DISCHARGE INSTRUCTIONS
Discharge Instructions  Chest Pain    You have been seen today for chest pain or discomfort.  At this time, your provider has found no signs that your chest pain is due to a serious or life-threatening condition, (or you have declined more testing and/or admission to the hospital). However, sometimes there is a serious problem that does not show up right away. Your evaluation today may not be complete and you may need further testing and evaluation.     Generally, every Emergency Department visit should have a follow-up clinic visit with either a primary or a specialty clinic/provider. Please follow-up as instructed by your emergency provider today.  Return to the Emergency Department if:    Your chest pain changes, gets worse, starts to happen more often, or comes with less activity.    You are newly short of breath.    You get very weak or tired.    You pass out or faint.    You have any new symptoms, like fever, cough, numb legs, or you cough up blood.    You have anything else that worries you.    Until you follow-up with your regular provider, please do the following:    Take one aspirin daily unless you have an allergy or are told not to by your provider.    If a stress test appointment has been made, go to the appointment.    If you have questions, contact your regular provider.    Follow-up with your regular provider/clinic as directed; this is very important.    If you were given a prescription for medicine here today, be sure to read all of the information (including the package insert) that comes with your prescription.  This will include important information about the medicine, its side effects, and any warnings that you need to know about.  The pharmacist who fills the prescription can provide more information and answer questions you may have about the medicine.  If you have questions or concerns that the pharmacist cannot address, please call or return to the Emergency Department.       Remember that  you can always come back to the Emergency Department if you are not able to see your regular provider in the amount of time listed above, if you get any new symptoms, or if there is anything that worries you.

## 2017-09-12 NOTE — H&P
[facility]  Observation Unit  H & P      Patient Name: Pablo Fuentes MRN# [id number]   Age: 76 year old YOB: 1940     Date of Admission:9/12/2017    Primary care provider: Isaac Nunez  Date of Service: 9/12/2017         Assessment and Plan:   Pablo Fuentes is a 76 year old male with a history of Chronic Pain, SBO, HTN, HLD, Paroxysmal Atrial Fibrillation, CAD s/p NSTEMI 4/2017 s/p circumflex stent placement who presented to the ED today 2/2 chest pain.    ED work up revealed patient hypertensive, HR 69, afebrile on room air.  Laboratory work up revealed plt 149 with otherwise normal BMP, BNP, Troponin, INR and CBC.  CXR wnl.  EKG revealed no acute ischemic changes.    Acute Chest Pain - acute onset of chest pain with repeated episodes since 0230 without radiation or shortness of breath.  Pain is reproducible on exam.    Does have hx of CAD with recent KHANG to the circumflex artery, increased exercise yesterday using his upper extremities making MSK etiology possible and has had increased anxiety recently with symptoms resolving after Lorazepam.  - admit to observation for serial troponin levels and telemetry monitoring  - NM Lexiscan in am  - sl nitro and IV morphine prn, continue home Flexeril prn and Lorazepam prn    CAD with hx of NSTEMI - status post PCI of the circumflex lesion 4/2017.  He has moderate RCA disease and moderate LAD disease, and he has diagonal 2 significant disease currently medically managed.  Troponin negative and EKG with no acute ischemic changes  - trops and stress test as above  - continue home ASA, Atorvastatin, Brilinta, Lopressor    HTN - elevated on arrival.  Has taken home medications.  Reports good control at home.      - continue home Lisinopril and Lopressor  - Hydralazine prn    Anxiety - increased symptoms recently.  Continue home Celexa and Lorazepam     CODE: Full  Diet/IVF: cardiac  GI ppx:  Tolerating a diet  DVT ppx: ambulation      Tess  "Isadora BROWN PA-C  Physician Assistant   Hospitalist Service  Pager: [phone]           Chief Complaint:   Chest Pain         HPI:   76 year old male with a history of Chronic Pain, SBO, HTN, HLD, Paroxysmal Atrial Fibrillation, CAD s/p NSTEMI 4/2017 s/p circumflex stent placement who presented to the ED today 2/2 chest pain.    Patient reports he awoke in the middle of the night around 0230 to use the restroom, went back to bed and was on his cell phone when he had a sudden onset of \"sharp\" sided left chest pain without radiation, shortness of breath, palpitations, lightheadedness.  He took a Lorazepam which resolved his pain.  He awoke around 9am and developed the same sharp left sided pain without radiation and again took Lorazepam with relief.  He presented to the ED for evaluation.  He feels the pain is reproducible.  Patient reports he had a cardiac stent placed in April and went through cardiac rehab.  He continues at the wellness program 4 days a week.  He was on the recumbent bike using his upper extremities for 55 minutes yesterday and feels he may have overdone it.  He denies chest pain or shortness of breath with exertion recently.  Denies peripheral edema.  Additionally, he reports increased anxiety recently due to personal stresses.  He reports this chest pain feels different than his MI in April.  He denies recent URI symptoms, cough, wheezing, abdominal pain, nausea, emesis, shortness of breath, uri symptoms, peripheral edema, fevers or chills.         Past Medical History:     Past Medical History:   Diagnosis Date     Hypertension           Past Surgical History:     Past Surgical History:   Procedure Laterality Date     BRONCHOSCOPY FLEXIBLE N/A 2/13/2015    Procedure: BRONCHOSCOPY FLEXIBLE;  Surgeon: Christo Gilbert MD;  Location: [facility] OR     CT CORONARY ANGIOGRAM  04/03/2017    KHANG mid Cx NSTEMI     ESOPHAGOSCOPY, GASTROSCOPY, DUODENOSCOPY (EGD), COMBINED N/A 2/10/2015    Procedure: " COMBINED ESOPHAGOSCOPY, GASTROSCOPY, DUODENOSCOPY (EGD);  Surgeon: Christo Gilbert MD;  Location: [facility] OR     EYE SURGERY       HERNIA REPAIR       LAPAROSCOPIC ASSISTED INSERTION TUBE GASTROTOMY N/A 2/13/2015    Procedure: LAPAROSCOPIC ASSISTED INSERTION TUBE GASTROSTOMY;  Surgeon: Christo Gilbert MD;  Location: [facility] OR     LAPAROSCOPIC HERNIORRHAPHY GIANT PARAESOPHAGEAL N/A 2/13/2015    Procedure: LAPAROSCOPIC HERNIORRHAPHY GIANT PARAESOPHAGEAL;  Surgeon: Christo Gilbert MD;  Location: [facility] OR     LAPAROSCOPY DIAGNOSTIC (GENERAL) N/A 3/23/2017    Procedure: LAPAROSCOPY DIAGNOSTIC (GENERAL);  Surgeon: Ramón Garcia MD;  Location: [facility] OR          Social History:     Social History     Social History     Marital status:      Spouse name: N/A     Number of children: N/A     Years of education: N/A     Occupational History     Not on file.     Social History Main Topics     Smoking status: Never Smoker     Smokeless tobacco: Not on file     Alcohol use No     Drug use: No     Sexual activity: Not on file     Other Topics Concern     Not on file     Social History Narrative          Family History:   No family history on file.       Allergies:    No Known Allergies       Medications:     Prior to Admission medications    Medication Sig Last Dose Taking? Auth Provider   citalopram (CELEXA) 20 MG tablet Take 20 mg by mouth daily   Reported, Patient   lisinopril (PRINIVIL/ZESTRIL) 10 MG tablet Take 0.5 tablets (5 mg) by mouth daily   Liyl Jacobo PA-C   aspirin EC 81 MG EC tablet Take 1 tablet (81 mg) by mouth daily   Janusz Soares MD   LORazepam (ATIVAN) 0.5 MG tablet Take 1 tablet (0.5 mg) by mouth every 8 hours as needed for anxiety   Janusz Soares MD   atorvastatin (LIPITOR) 40 MG tablet Take 1 tablet (40 mg) by mouth daily   Janusz Soares MD   ticagrelor (BRILINTA) 90 MG tablet Take 1 tablet (90 mg) by mouth every 12 hours   Janusz Soares,  MD   nitroglycerin (NITROSTAT) 0.4 MG sublingual tablet For chest pain place 1 tablet under the tongue every 5 minutes for 3 doses. If symptoms persist 5 minutes after 1st dose call 911.   Janusz Soares MD   Biotin (BIOTIN MAXIMUM STRENGTH) 10 MG TABS tablet Take 10,000 mcg by mouth    Unknown, Entered By History   Cranberry 300 MG TABS Take 300 mg by mouth    Unknown, Entered By History   Multiple Vitamins-Minerals (CENTRUM SILVER) per tablet Take 1 tablet by mouth daily   Unknown, Entered By History   HYDROcodone-acetaminophen (NORCO) 5-325 MG per tablet Take 1-2 tablets by mouth every 4 hours as needed for moderate to severe pain   Marietta Landa MD   Multiple Vitamins-Minerals (OCUVITE PO) Take 1 tablet by mouth    Unknown, Entered By History   Metoprolol Tartrate (LOPRESSOR PO) Take 25 mg by mouth 2 times daily   Reported, Patient          Review of Systems:   A complete ROS was performed and is negative other than what is stated in the HPI.       Physical Exam:   Blood pressure (!) 185/111, temperature 97.4  F (36.3  C), temperature source Oral, resp. rate 16, SpO2 98 %. on room air  General: Alert, interactive, NAD, lying in bed, pleasant and calm with wife at the bedside  HEENT: AT/NC, sclera anicteric, PERRL, EOMI  Neck: Supple, no JVD  Chest/Resp: clear to auscultation bilaterally, no crackles or wheezes  Heart/CV: regular rate and rhythm, no murmur  Abdomen/GI: Soft, nontender, nondistended. +BS.  No HSM or masses, no rebound or guarding.  Extremities/MSK: No LE edema  Skin: Warm and dry  Neuro: Alert & oriented x 3, no focal deficits, moves all extremities equally         Labs:   ROUTINE ICU LABS (Last four results)  CMP  Recent Labs  Lab 09/12/17  1320      POTASSIUM 4.2   CHLORIDE 107   CO2 27   ANIONGAP 5   *   BUN 22   CR 0.90   GFRESTIMATED 82   GFRESTBLACK >90   JOANN 8.7     CBC  Recent Labs  Lab 09/12/17  1320   WBC 5.7   RBC 4.40   HGB 15.1   HCT 43.7   MCV 99   MCH  34.3*   MCHC 34.6   RDW 12.4   *     INR  Recent Labs  Lab 09/12/17  1320   INR 0.95     Arterial Blood GasNo lab results found in last 7 days.       Imaging/Procedures:     Results for orders placed or performed during the hospital encounter of 09/12/17   XR Chest 2 Views    Narrative    XR CHEST 2 VW 9/12/2017 1:58 PM    COMPARISON: 4/2/2017    HISTORY: Chest pain.      Impression    IMPRESSION: Cardiac silhouette and pulmonary vasculature are within  normal limits. No focal airspace disease, pleural effusion or  pneumothorax.    BAYRON FELIZ MD

## 2017-09-12 NOTE — IP AVS SNAPSHOT
MRN:[id number]                      After Visit Summary   9/12/2017    Pablo Fuentes    MRN: [id number]           Thank you!     Thank you for choosing [facility] for your care. Our goal is always to provide you with excellent care. Hearing back from our patients is one way we can continue to improve our services. Please take a few minutes to complete the written survey that you may receive in the mail after you visit. If you would like to speak to someone directly about your visit please contact Patient Relations at [phone]. Thank you!          Patient Information     Date Of Birth          1940        About your hospital stay     You were admitted on:  September 12, 2017 You last received care in the:  [facility] Observation Department    You were discharged on:  September 13, 2017        Reason for your hospital stay       You were evaluated in the hospital for chest pain. It appears to be very consistent with a musculoskeletal strain.  You had a cardiac workup done here. Your EKG and overnight heart rhythm monitoring were normal. Your lab work was completely normal, indicating that you did not have a heart attack associated with this episode of chest pain. Your lexiscan showed a very small area that represented a previous heart attack and a very small area of mild decreased perfusion to a small part of the heart. This very likely not the cause of your chest pain. We discussed your lexiscan stress test findings with one of Dr. Benites' colleagues to ensure that you were safe for discharge. You should continue the medications you are on and contact the cardiology clinic to follow up with Dr. Benites in the next month or so. Follow up with your primary care provider in the next week for hospital follow up and to discuss possibly adjusting your anxiety medications if felt appropriate.                  Who to Call     For medical emergencies, please call 911.  For  non-urgent questions about your medical care, please call your primary care provider or clinic, [phone]          Attending Provider     Provider Specialty    Alireza Tolentino MD Emergency Medicine    Shabbir Bailey MD Internal Medicine       Primary Care Provider Office Phone # Fax #    Isaac Nunez [phone]       When to contact your care team       Call your primary doctor if you have any of the following: temperature greater than 101, increased shortness of breath or increased pain.                  After Care Instructions     Activity       Your activity upon discharge: activity as tolerated            Diet       Follow this diet upon discharge: Cardiac prudent diet (low salt)                  Follow-up Appointments     Follow-up and recommended labs and tests        Follow up with primary care provider, Isaac Nunez, within 7 days for hospital follow- up.  No follow up labs or test are needed.  Follow up with Dr. Benites in the next month for hospital follow up.                   Follow-up Information     Follow up with Frank Nelson MD. Schedule an appointment as soon as possible for a visit in 1 week.    Specialty:  Cardiology    Why:  For re-check with recent ED visit for chest pain and declined admission at that time.      Contact information:    [address]  [facility] [place] [address]  [phone]          Follow up with [facility] Emergency Department.    Specialty:  EMERGENCY MEDICINE    Why:  If symptoms worsen    Contact information:    [address]  [facility] [id number] [phone]        Schedule an appointment as soon as possible for a visit with Isaac Nunez DO.    Specialty:  Family Practice    Why:  As needed    Contact information:    [facility]  [address]  [facility] [address] [phone]                  Further instructions from your care team       Discharge Instructions  Chest  Pain    You have been seen today for chest pain or discomfort.  At this time, your provider has found no signs that your chest pain is due to a serious or life-threatening condition, (or you have declined more testing and/or admission to the hospital). However, sometimes there is a serious problem that does not show up right away. Your evaluation today may not be complete and you may need further testing and evaluation.     Generally, every Emergency Department visit should have a follow-up clinic visit with either a primary or a specialty clinic/provider. Please follow-up as instructed by your emergency provider today.  Return to the Emergency Department if:    Your chest pain changes, gets worse, starts to happen more often, or comes with less activity.    You are newly short of breath.    You get very weak or tired.    You pass out or faint.    You have any new symptoms, like fever, cough, numb legs, or you cough up blood.    You have anything else that worries you.    Until you follow-up with your regular provider, please do the following:    Take one aspirin daily unless you have an allergy or are told not to by your provider.    If a stress test appointment has been made, go to the appointment.    If you have questions, contact your regular provider.    Follow-up with your regular provider/clinic as directed; this is very important.    If you were given a prescription for medicine here today, be sure to read all of the information (including the package insert) that comes with your prescription.  This will include important information about the medicine, its side effects, and any warnings that you need to know about.  The pharmacist who fills the prescription can provide more information and answer questions you may have about the medicine.  If you have questions or concerns that the pharmacist cannot address, please call or return to the Emergency Department.       Remember that you can always come back to  "the Emergency Department if you are not able to see your regular provider in the amount of time listed above, if you get any new symptoms, or if there is anything that worries you.      Pending Results     Date and Time Order Name Status Description    2017 1439 EKG 12 lead Preliminary             Statement of Approval     Ordered          17 1407  I have reviewed and agree with all the recommendations and orders detailed in this document.  EFFECTIVE NOW     Approved and electronically signed by:  Erin Nelson PA-C             Admission Information     Date & Time Provider Department Dept. Phone    2017 Shabbir Bailey MD [facility] Observation Department [phone]      Your Vitals Were     Blood Pressure Pulse Temperature Respirations Height Weight    135/93 (BP Location: Left arm) 70 96.9  F (36.1  C) (Axillary) 20 1.727 m (5' 8\") 77.8 kg (171 lb 9.6 oz)    Pulse Oximetry BMI (Body Mass Index)                98% 26.09 kg/m2          Deenty Information     De[employer] lets you send messages to your doctor, view your test results, renew your prescriptions, schedule appointments and more. To sign up, go to [email or web address] . Click on \"Log in\" on the left side of the screen, which will take you to the Welcome page. Then click on \"Sign up Now\" on the right side of the page.     You will be asked to enter the access code listed below, as well as some personal information. Please follow the directions to create your username and password.     Your access code is: [email or web address]  Expires: 2017  2:09 PM     Your access code will  in 90 days. If you need help or a new code, please call your [place] clinic or [phone].        Care EveryWhere ID     This is your Care EveryWhere ID. This could be used by other organizations to access your [place] medical records  [id number]        Equal Access to Services     JUNG WONG AH: lucho Kunz " ktfarhat levy imanikonrad ordonez, duncan watermanaan ah. So [facility] [phone].    ATENCIÓN: Si chantel iqbal, tiene a rose disposición servicios gratuitos de asistencia lingüística. Llame al [phone].    We comply with applicable federal civil rights laws and [place] laws. We do not discriminate on the basis of race, color, national origin, age, disability sex, sexual orientation or gender identity.               Review of your medicines      CONTINUE these medicines which have NOT CHANGED        Dose / Directions    aspirin 81 MG EC tablet   Used for:  NSTEMI (non-ST elevated myocardial infarction) (H)        Dose:  81 mg   Take 1 tablet (81 mg) by mouth daily   Quantity:  120 tablet   Refills:  11       atorvastatin 40 MG tablet   Commonly known as:  LIPITOR   Used for:  NSTEMI (non-ST elevated myocardial infarction) (H)        Dose:  40 mg   Take 1 tablet (40 mg) by mouth daily   Quantity:  30 tablet   Refills:  3       BIOTIN MAXIMUM STRENGTH 10 MG Tabs tablet   Generic drug:  Biotin        Dose:  67910 mcg   Take 10,000 mcg by mouth   Refills:  0       CENTRUM SILVER per tablet        Dose:  1 tablet   Take 1 tablet by mouth daily   Refills:  0       citalopram 20 MG tablet   Commonly known as:  celeXA        Dose:  20 mg   Take 20 mg by mouth daily   Refills:  0       cyclobenzaprine 10 MG tablet   Commonly known as:  FLEXERIL        Dose:  10 mg   Take 10 mg by mouth 3 times daily as needed for muscle spasms   Refills:  0       HYDROcodone-acetaminophen 5-325 MG per tablet   Commonly known as:  NORCO   Used for:  Small bowel obstruction (H)        Dose:  1-2 tablet   Take 1-2 tablets by mouth every 4 hours as needed for moderate to severe pain   Quantity:  20 tablet   Refills:  0       lisinopril 10 MG tablet   Commonly known as:  PRINIVIL/ZESTRIL   Used for:  NSTEMI (non-ST elevated myocardial infarction) (H), Essential hypertension        Dose:  5 mg   Take 0.5 tablets (5 mg) by  mouth daily   Refills:  0       LOPRESSOR PO        Dose:  25 mg   Take 25 mg by mouth 2 times daily   Refills:  0       LORazepam 0.5 MG tablet   Commonly known as:  ATIVAN   Used for:  Anxiety        Dose:  0.5 mg   Take 1 tablet (0.5 mg) by mouth every 8 hours as needed for anxiety   Quantity:  21 tablet   Refills:  0       nitroGLYcerin 0.4 MG sublingual tablet   Commonly known as:  NITROSTAT   Used for:  NSTEMI (non-ST elevated myocardial infarction) (H)        For chest pain place 1 tablet under the tongue every 5 minutes for 3 doses. If symptoms persist 5 minutes after 1st dose call 911.   Quantity:  25 tablet   Refills:  0       ticagrelor 90 MG tablet   Commonly known as:  BRILINTA   Used for:  NSTEMI (non-ST elevated myocardial infarction) (H)        Dose:  90 mg   Take 1 tablet (90 mg) by mouth every 12 hours   Quantity:  60 tablet   Refills:  11                Protect others around you: Learn how to safely use, store and throw away your medicines at www.disposemymeds.org.             Medication List: This is a list of all your medications and when to take them. Check marks below indicate your daily home schedule. Keep this list as a reference.      Medications           Morning Afternoon Evening Bedtime As Needed    aspirin 81 MG EC tablet   Take 1 tablet (81 mg) by mouth daily   Last time this was given:  81 mg on 9/13/2017 10:23 AM                                atorvastatin 40 MG tablet   Commonly known as:  LIPITOR   Take 1 tablet (40 mg) by mouth daily   Last time this was given:  40 mg on 9/13/2017 10:24 AM                                BIOTIN MAXIMUM STRENGTH 10 MG Tabs tablet   Take 10,000 mcg by mouth   Generic drug:  Biotin                                CENTRUM SILVER per tablet   Take 1 tablet by mouth daily                                citalopram 20 MG tablet   Commonly known as:  celeXA   Take 20 mg by mouth daily   Last time this was given:  20 mg on 9/13/2017 10:24 AM                                 cyclobenzaprine 10 MG tablet   Commonly known as:  FLEXERIL   Take 10 mg by mouth 3 times daily as needed for muscle spasms                                HYDROcodone-acetaminophen 5-325 MG per tablet   Commonly known as:  NORCO   Take 1-2 tablets by mouth every 4 hours as needed for moderate to severe pain                                lisinopril 10 MG tablet   Commonly known as:  PRINIVIL/ZESTRIL   Take 0.5 tablets (5 mg) by mouth daily   Last time this was given:  5 mg on 9/13/2017 10:26 AM                                LOPRESSOR PO   Take 25 mg by mouth 2 times daily   Last time this was given:  25 mg on 9/13/2017 10:24 AM                                LORazepam 0.5 MG tablet   Commonly known as:  ATIVAN   Take 1 tablet (0.5 mg) by mouth every 8 hours as needed for anxiety   Last time this was given:  0.5 mg on 9/13/2017 12:26 PM                                nitroGLYcerin 0.4 MG sublingual tablet   Commonly known as:  NITROSTAT   For chest pain place 1 tablet under the tongue every 5 minutes for 3 doses. If symptoms persist 5 minutes after 1st dose call 911.                                ticagrelor 90 MG tablet   Commonly known as:  BRILINTA   Take 1 tablet (90 mg) by mouth every 12 hours   Last time this was given:  90 mg on 9/13/2017 10:24 AM

## 2017-09-12 NOTE — ED NOTES
[facility]  ED Nurse Handoff Report    Pablo Fuentes is a 76 year old male   ED Chief complaint: Chest Pain  . ED Diagnosis:   Final diagnoses:   Chest pain, unspecified type     Allergies: No Known Allergies    Code Status: Full Code  Activity level - Baseline/Home:  Independent. Activity Level - Current:   Stand with Assist. Lift room needed: No. Bariatric: No   Needed: No   Isolation: No. Infection: Not Applicable.     Vital Signs:   Vitals:    09/12/17 1325 09/12/17 1358 09/12/17 1417 09/12/17 1455   BP: (!) 153/113  (!) 185/111 (!) 178/107   Resp:       Temp:       TempSrc:       SpO2: 97% 99% 98% 98%       Cardiac Rhythm:  ,   Cardiac  Cardiac Rhythm: Sinus bradycardia  Pain level:    Patient confused: No. Patient Falls Risk: Yes.   Elimination Status: Unable to void, no urge since ED admission  Patient Report - Initial Complaint: Chest pain last night and this morning. Took ativan with relief at home. Focused Assessment: No chest pain here. A&Ox4, normal sinus rhythm HR 60s, 1st degree AV block. Denies SOB N/V. Had an episode of 5 seconds of chest pain here, no changes noted.    Tests Performed:   Labs Ordered and Resulted from Time of ED Arrival Up to the Time of Departure from the ED   CBC WITH PLATELETS DIFFERENTIAL - Abnormal; Notable for the following:        Result Value    MCH 34.3 (*)     Platelet Count 149 (*)     All other components within normal limits   BASIC METABOLIC PANEL - Abnormal; Notable for the following:     Glucose 100 (*)     All other components within normal limits   TROPONIN I   INR   PARTIAL THROMBOPLASTIN TIME   NT PROBNP INPATIENT   PULSE OXIMETRY NURSING   CARDIAC CONTINUOUS MONITORING   PERIPHERAL IV CATHETER     XR Chest 2 Views   Final Result   IMPRESSION: Cardiac silhouette and pulmonary vasculature are within   normal limits. No focal airspace disease, pleural effusion or   pneumothorax.      BAYRON FELIZ MD      . Abnormal Results: Above.    Treatments provided: Supportive care  Family Comments: Wife at bedside, concerned about pt.  OBS brochure/video discussed/provided to patient:  Yes  ED Medications: Medications - No data to display  Drips infusing:  No  For the majority of the shift, the patient's behavior Green. Interventions performed were N/A.     Severe Sepsis OR Septic Shock Diagnosis Present: No      ED Nurse Name/Phone Number: Juan Aguilar,   3:04 PM  RECEIVING UNIT ED HANDOFF REVIEW    Above ED Nurse Handoff Report was reviewed: Yes  Reviewed by: Jhoana Faith on September 12, 2017 at 3:30 PM

## 2017-09-12 NOTE — ED NOTES
"Patient presents to ER with Chest pain that is a \"stabbing pain\". Recent stent placement and MI in April 2017. States pain started last night and \"went away\" after taking a \"stress pill\". Denies pain at this time, no SOB or dizziness at this time.  "

## 2017-09-12 NOTE — PHARMACY-ADMISSION MEDICATION HISTORY
Admission medication history interview status for this patient is complete. See [facility] admission navigator for allergy information, prior to admission medications and immunization status.     Medication history interview source(s):Patient and Family  Medication history resources (including written lists, pill bottles, clinic record):med list  Primary [facility]    Changes made to PTA medication list:  Added: - flexeril  Deleted: cranberry, ocuvite  Changed: -    Actions taken by pharmacist (provider contacted, etc):None     Additional medication history information:None    Medication reconciliation/reorder completed by provider prior to medication history? No    Do you take OTC medications (eg tylenol, ibuprofen, fish oil, eye/ear drops, etc)? Yes(Y/N)    For patients on insulin therapy: No (Y/N)  Lantus/levemir/NPH/Mix 70/30 dose:   (Y/N) (see Med list for doses)   Sliding scale Novolog Y/N  If Yes, do you have a baseline novolog pre-meal dose:  units with meals  Patients eat three meals a day:   Y/N    How many episodes of hypoglycemia do you have per week: _______  How many missed doses do you have per week: ______  How many times do you check your blood glucose per day: _______   Any Barriers to therapy - Be specific :  cost of medications, comfortable with giving injections (if applicable), comfortable and confident with current diabetes regimen: Y/N ______________      Prior to Admission medications    Medication Sig Last Dose Taking? Auth Provider   citalopram (CELEXA) 20 MG tablet Take 20 mg by mouth daily 9/12/2017 at Unknown time Yes Reported, Patient   lisinopril (PRINIVIL/ZESTRIL) 10 MG tablet Take 0.5 tablets (5 mg) by mouth daily 9/12/2017 at Unknown time Yes Lily Jacobo PA-C   aspirin EC 81 MG EC tablet Take 1 tablet (81 mg) by mouth daily 9/12/2017 at Unknown time Yes Janusz Soares MD   LORazepam (ATIVAN) 0.5 MG tablet Take 1 tablet (0.5 mg) by mouth every 8 hours  as needed for anxiety 9/12/2017 at Unknown time Yes Janusz Soares MD   atorvastatin (LIPITOR) 40 MG tablet Take 1 tablet (40 mg) by mouth daily 9/12/2017 at Unknown time Yes Janusz Soares MD   ticagrelor (BRILINTA) 90 MG tablet Take 1 tablet (90 mg) by mouth every 12 hours 9/12/2017 at x1 Yes Janusz Soares MD   nitroglycerin (NITROSTAT) 0.4 MG sublingual tablet For chest pain place 1 tablet under the tongue every 5 minutes for 3 doses. If symptoms persist 5 minutes after 1st dose call 911.  Yes Janusz Soares MD   Biotin (BIOTIN MAXIMUM STRENGTH) 10 MG TABS tablet Take 10,000 mcg by mouth  9/12/2017 at Unknown time Yes Unknown, Entered By History   Multiple Vitamins-Minerals (CENTRUM SILVER) per tablet Take 1 tablet by mouth daily 9/12/2017 at Unknown time Yes Unknown, Entered By History   HYDROcodone-acetaminophen (NORCO) 5-325 MG per tablet Take 1-2 tablets by mouth every 4 hours as needed for moderate to severe pain  Yes Marietta Landa MD   Metoprolol Tartrate (LOPRESSOR PO) Take 25 mg by mouth 2 times daily 9/12/2017 at x1 Yes Reported, Patient

## 2017-09-12 NOTE — ED NOTES
"Pt c/o of \"a few seconds\" of chest pain, gone now. Wife concerned about bringing pt home and would like us to capture the heart rhythm when these episodes happen. Explained to pt and wife the best option would be doing that at the hospital where someone is monitoring it overnight. Pt and wife would like to speak to MD regarding plan, MD updated.  "

## 2017-09-12 NOTE — ED PROVIDER NOTES
History     Chief Complaint:  Chest Pain      HPI   Pablo Fuentes is a 76 year old male who presents to the emergency department today for evaluation of chest pain. The patient reports sharp sudden onset chest pain at 0230 that dissipated after taking an Lorazepam. He then awoke this morning with returning chest pain at 0900 and took another Lorazepam with improvement again. He denies pain at this time but presents to the emergency department for evaluation due to his cardiac history. Per chart review, the patient had an NSTEMI in April of 2017 with a circumflex occlusion that was treated with a stent. He was also found to have a small stenosis of his LAD and his ejection fracture was found to be 60%. His wife reports that he exerted himself yesterday at Cardiac rehab more than he typically does and she is concerned for a musculoskeletal problem. The patient denies any fevers, nausea, vomiting, abdominal pain, diarrhea shortness of breath or dizziness. He denies any recent long travel or surgeries.    Cardiac Risk Factors:  CAD:                           Positive  Hypertension:              Positive  Hyperlipidemia:           Negative  Diabetes:                     Negative  Tobacco use:              Negative  Gender:                       Male  Age:                             76  Familial Hx of CAD:    Negative    PE/DVT Risk Factors:   Hx of PE/DVT:                        Negative  Hx of clotting disorder:            Negative  Hx of cancer:                          Negative  Tobacco use:                          Negative  Prolonged immobilization:       Negative  Recent surgery:                       Negative  Recent travel:                          Negative  Familial Hx of PE/DVT:           Negative     Allergies:  No Known Drug Allergies     Medications:    citalopram (CELEXA) 20 MG tablet  lisinopril (PRINIVIL/ZESTRIL) 10 MG tablet  aspirin EC 81 MG EC tablet  LORazepam (ATIVAN) 0.5 MG tablet  atorvastatin  (LIPITOR) 40 MG tablet  ticagrelor (BRILINTA) 90 MG tablet  nitroglycerin (NITROSTAT) 0.4 MG sublingual tablet  Biotin (BIOTIN MAXIMUM STRENGTH) 10 MG TABS tablet  Metoprolol Tartrate (LOPRESSOR PO)    Past Medical History:    Hypertension  CAD    Past Surgical History:    Bronchoscopy flexible  CT Coronary Angiogram  EGD  Eye surgery  Hernia repair  Laparoscopic assisted insertion tube gastrotomy  Laparoscopic herniorrhaphy giant paraesophageal  Laparoscopy diagnostic general    Family History:    History reviewed. No pertinent family history.     Social History:  The patient was accompanied to the ED by his wife.  Smoking Status: Never Smoker  Alcohol Use: No   Marital Status:       Review of Systems   Respiratory: Negative for shortness of breath.    Cardiovascular: Positive for chest pain.   Neurological: Negative for dizziness.   All other systems reviewed and are negative.    Physical Exam   First Vitals:  BP: (!) 155/106  Heart Rate: 69  Temp: 97.4  F (36.3  C)  Resp: 16  SpO2: 97 %    Physical Exam  General: Alert, appears well-developed and well-nourished. Cooperative.     In no distress  HEENT:  Head:  Atraumatic  Ears:  External ears are normal  Mouth/Throat:  Oropharynx is without erythema or exudate and mucous membranes are moist.   Eyes:   Conjunctivae normal and EOM are normal. No scleral icterus.    Pupils are equal, round, and reactive to light.   Neck:   Normal range of motion. Neck supple.  CV:  Normal rate, regular rhythm, normal heart sounds and radial and dorsalis pedis pulses are 2+ and symmetric.  No murmur.  Resp:  Breath sounds are clear bilaterally    Non-labored, no retractions or accessory muscle use  GI:  Abdomen is soft, no distension, no tenderness. No rebound or guarding.  MS:  Normal range of motion. No edema.    Normal strength in all 4 extremities.     Back atraumatic.  Skin:  Warm and dry.  No rash or lesions noted.  Neuro:   Alert. Normal strength.  Sensation intact in  all 4 extremities. GCS: 15  Psych: Normal mood and affect.    HEART Score  Background  Calculates the overall risk of adverse event in patient's presenting with chest pain.  Based on 5 criteria (each assigned 0-2 points) including suspiciousness of history, EKG, age, risk factors and troponin.    Data  76 year old male  has Paraesophageal hernia; Post-operative state; Incarcerated giant hiatal hernia; Malnutrition (H); Physical deconditioning; Abdominal pain, generalized; and ACS (acute coronary syndrome) (H) on his problem list.   reports that he has never smoked. He does not have any smokeless tobacco history on file.  family history is not on file.  Lab Results   Component Value Date    TROPI <0.015 09/12/2017     Criteria   0-2 points for each of 5 items (maximum of 10 points):  Score 2- History highly suspicious for coronary syndrome  Score 0- EKG Normal  Score 2- Age 65 years or older   Score 2- Three or more risk factors for or history of atherosclerotic disease  Score 0- Within normal limits for troponin levels  Interpretation  Risk of adverse outcome  Heart Score: 6  Total Score 4-6- Adverse Outcome Risk 20.3% - Supports admission with standard rule-out management -serial troponins and stress testing     Emergency Department Course     ECG:  Indication: Chest Pain  Completed at 1248.  Read at 1319.   Sinus rhythm with 1st degree AV block  Otherwise normal ECG  Rate 67 bpm. WY interval 212. QRS duration 94. QT/QTc 428/452. P-R-T axes -1 -21 0.     ECG:  Indication: Chest Pain  Completed at 1448.  Read at 1455.   Normal sinus rhythm  Normal ECG  Rate 64 bpm. WY interval 206. QRS duration 94. QT/QTc 442/455. P-R-T axes 0 -27 -10.     Imaging:  Radiology findings were communicated with the patient who voiced understanding of the findings.    XR Chest 2 Views  IMPRESSION: Cardiac silhouette and pulmonary vasculature are within  normal limits. No focal airspace disease, pleural effusion or  pneumothorax.  Report  per radiology      Laboratory:  Laboratory findings were communicated with the patient who voiced understanding of the findings.  CBC:  (L) o/w WNL. (WBC 5.7, HGB 15.1)   BMP: Glucose 100 (H) o/w WNL (Creatinine 0.90)  Troponin (Collected 1320): <0.015  INR: 0.95  PTT: 27  BNP: 590    Emergency Department Course:  Nursing notes and vitals reviewed.  1311: I performed an exam of the patient as documented above.   EKGs obtained in the ED, see results above.   The patient was sent for a XR Chest 2 Views while in the emergency department, results above.   IV was inserted and blood was drawn for laboratory testing, results above.   1357: Patient rechecked and updated.  The patient declined admission to the hospital against medical advice at this time.  1431: Patient rechecked and updated. The patient had another episode of chest pain and is now accepting admission to the hospital.   1454: I spoke with Dr. Bailey of the hospitalist service regarding patient's presentation, findings, and plan of care.   I discussed the treatment plan with the patient. They expressed understanding of this plan and consented to admission.  I personally reviewed the laboratory and imaging results with the Patient and answered all related questions prior to admit.     Impression & Plan      Medical Decision Making:  Pablo Fuentes is a 76 year old male with a significant history of coronary artery disease with recent drug eluting stent placed in the circumflex artery who presents with multiple episodes of chest pain since last night around midnight. The last episode was several hours prior to arrival. He had a negative troponin here, unchanged EKG with no acute evidence of ischemia, his labs are otherwise unremarkable and his chest x-ray is negative for acute processes.  Pt with repeat EKG after repeat chest pain episode a few minutes after arrival to ED although didn't alert anyone until one hour after this occurred.  No chest pain on  recheck.  EKG unchanged.  Pt will be admitted for observation, serial troponin, as well as likely repeat ECHO to evaluate for possible wall motion abnormalities and or other etiologies of intermittent chest pain.  Patient was discussed with Dr. Bailey, who accepted admission.     Diagnosis:    ICD-10-CM    1. Chest pain, unspecified type R07.9      Disposition:  Admitted to Observation    Scribe Disclosure:  TAMARA, Ananya Whalen, am serving as a scribe at 1:03 PM on 9/12/2017 to document services personally performed by Alireza Tolentino MD based on my observations and the provider's statements to me.    9/12/2017   [facility] EMERGENCY DEPARTMENT       Alireza Tolentino MD  09/12/17 [id number]

## 2017-09-13 ENCOUNTER — APPOINTMENT (OUTPATIENT)
Dept: NUCLEAR MEDICINE | Facility: CLINIC | Age: 77
End: 2017-09-13
Attending: PHYSICIAN ASSISTANT
Payer: MEDICARE

## 2017-09-13 VITALS
HEIGHT: 68 IN | BODY MASS INDEX: 26.01 KG/M2 | DIASTOLIC BLOOD PRESSURE: 93 MMHG | OXYGEN SATURATION: 98 % | WEIGHT: 171.6 LBS | SYSTOLIC BLOOD PRESSURE: 135 MMHG | RESPIRATION RATE: 20 BRPM | TEMPERATURE: 96.9 F | HEART RATE: 70 BPM

## 2017-09-13 LAB — TROPONIN I SERPL-MCNC: <0.015 UG/L (ref 0–0.04)

## 2017-09-13 PROCEDURE — 34300033 ZZH RX 343: Performed by: HOSPITALIST

## 2017-09-13 PROCEDURE — 93016 CV STRESS TEST SUPVJ ONLY: CPT | Performed by: INTERNAL MEDICINE

## 2017-09-13 PROCEDURE — 25000132 ZZH RX MED GY IP 250 OP 250 PS 637: Mod: GY | Performed by: PHYSICIAN ASSISTANT

## 2017-09-13 PROCEDURE — 78452 HT MUSCLE IMAGE SPECT MULT: CPT | Mod: 26 | Performed by: INTERNAL MEDICINE

## 2017-09-13 PROCEDURE — G0378 HOSPITAL OBSERVATION PER HR: HCPCS

## 2017-09-13 PROCEDURE — 99217 ZZC OBSERVATION CARE DISCHARGE: CPT | Performed by: PHYSICIAN ASSISTANT

## 2017-09-13 PROCEDURE — A9270 NON-COVERED ITEM OR SERVICE: HCPCS | Mod: GY | Performed by: PHYSICIAN ASSISTANT

## 2017-09-13 PROCEDURE — 25000128 H RX IP 250 OP 636

## 2017-09-13 PROCEDURE — 93017 CV STRESS TEST TRACING ONLY: CPT

## 2017-09-13 PROCEDURE — 36415 COLL VENOUS BLD VENIPUNCTURE: CPT | Performed by: PHYSICIAN ASSISTANT

## 2017-09-13 PROCEDURE — 84484 ASSAY OF TROPONIN QUANT: CPT | Performed by: PHYSICIAN ASSISTANT

## 2017-09-13 PROCEDURE — A9502 TC99M TETROFOSMIN: HCPCS | Performed by: HOSPITALIST

## 2017-09-13 PROCEDURE — 78452 HT MUSCLE IMAGE SPECT MULT: CPT

## 2017-09-13 PROCEDURE — 93018 CV STRESS TEST I&R ONLY: CPT | Performed by: INTERNAL MEDICINE

## 2017-09-13 RX ORDER — REGADENOSON 0.08 MG/ML
INJECTION, SOLUTION INTRAVENOUS
Status: COMPLETED
Start: 2017-09-13 | End: 2017-09-13

## 2017-09-13 RX ADMIN — TETROFOSMIN 10.8 MCI.: 1.38 INJECTION, POWDER, LYOPHILIZED, FOR SOLUTION INTRAVENOUS at 07:10

## 2017-09-13 RX ADMIN — METOPROLOL TARTRATE 25 MG: 25 TABLET ORAL at 10:24

## 2017-09-13 RX ADMIN — TICAGRELOR 90 MG: 90 TABLET ORAL at 10:24

## 2017-09-13 RX ADMIN — ATORVASTATIN CALCIUM 40 MG: 40 TABLET, FILM COATED ORAL at 10:24

## 2017-09-13 RX ADMIN — LORAZEPAM 0.5 MG: 0.5 TABLET ORAL at 00:48

## 2017-09-13 RX ADMIN — ASPIRIN 81 MG: 81 TABLET, COATED ORAL at 10:23

## 2017-09-13 RX ADMIN — LORAZEPAM 0.5 MG: 0.5 TABLET ORAL at 12:26

## 2017-09-13 RX ADMIN — LISINOPRIL 5 MG: 5 TABLET ORAL at 10:26

## 2017-09-13 RX ADMIN — TETROFOSMIN 30.5 MCI.: 1.38 INJECTION, POWDER, LYOPHILIZED, FOR SOLUTION INTRAVENOUS at 08:36

## 2017-09-13 RX ADMIN — REGADENOSON 0.4 MG: 0.08 INJECTION, SOLUTION INTRAVENOUS at 08:41

## 2017-09-13 RX ADMIN — CITALOPRAM HYDROBROMIDE 20 MG: 20 TABLET ORAL at 10:24

## 2017-09-13 NOTE — PLAN OF CARE
Problem: Discharge Planning  Goal: Discharge Planning (Adult, OB, Behavioral, Peds)  Outcome: Improving  Patient's After Visit Summary was reviewed with patient and/or spouse.   Patient verbalized understanding of After Visit Summary, recommended follow up and was given an opportunity to ask questions.   Discharge medications sent home with patient/family: Not applicable   Discharged with spouse     OBSERVATION patient END time: 2:45pm

## 2017-09-13 NOTE — PLAN OF CARE
Problem: Discharge Planning  Goal: Discharge Planning (Adult, OB, Behavioral, Peds)  Outcome: Improving  PRIMARY DIAGNOSIS: CHEST PAIN  OUTPATIENT/OBSERVATION GOALS TO BE MET BEFORE DISCHARGE:  1. Ruled out acute coronary syndrome (negative or stable Troponin):  Yes  2. Pain Status: is not having chest pain, is having left axillary pectoris pain.  States it hurts when he moves it but at rest it is not as painful  3. Appropriate provocative testing performed: No  - Stress Test Procedure: Lesiscan  - Interpretation of cardiac rhythm per telemetry tech: Not placed  Yet. Awaiting monitor       4. Cleared by Consultants (if applicable):No  5. Return to near baseline physical activity: Yes  Discharge Planner Nurse   Safe discharge environment identified: Yes  Barriers to discharge: No       Entered by: Erin Torres 09/12/2017 4:47 PM     Please review provider order for any additional goals.

## 2017-09-13 NOTE — PLAN OF CARE
Problem: Discharge Planning  Goal: Discharge Planning (Adult, OB, Behavioral, Peds)  Outcome: Improving  PRIMARY DIAGNOSIS: CHEST PAIN  OUTPATIENT/OBSERVATION GOALS TO BE MET BEFORE DISCHARGE:  1. Ruled out acute coronary syndrome (negative or stable Troponin):  Yes  2. Pain Status: Pain free.  3. Appropriate provocative testing performed: N/A  - Stress Test Procedure: Lexiscan in AM  - Interpretation of cardiac rhythm per telemetry tech: jing with 1 degree AVB and prolong QT      4. Cleared by Consultants (if applicable):N/A  5. Return to near baseline physical activity: Yes  Discharge Planner Nurse   Safe discharge environment identified: Yes  Barriers to discharge: Echo stress test in AM       Entered by: Laura Martin 09/13/2017 2:39 AM     Please review provider order for any additional goals.   Nurse to notify provider when observation goals have been met and patient is ready for discharge.  Denies pain. Ativan x1. Resting comfortably. Troponin negative x3. Lexiscan this AM with plan to discharge home after.

## 2017-09-13 NOTE — PLAN OF CARE
Problem: Discharge Planning  Goal: Discharge Planning (Adult, OB, Behavioral, Peds)  Outcome: Improving  PRIMARY DIAGNOSIS: CHEST PAIN  OUTPATIENT/OBSERVATION GOALS TO BE MET BEFORE DISCHARGE:  1. Ruled out acute coronary syndrome (negative or stable Troponin):  Yes  2. Pain Status: Pain free.  3. Appropriate provocative testing performed: N/A  - Stress Test Procedure: Stress echo in AM  - Interpretation of cardiac rhythm per telemetry tech: jing with 1 degree AVB and prolong QT     4. Cleared by Consultants (if applicable):N/A  5. Return to near baseline physical activity: Yes  Discharge Planner Nurse   Safe discharge environment identified: Yes  Barriers to discharge: Echo stress test in AM       Entered by: Laura Martin 09/13/2017 2:39 AM     Please review provider order for any additional goals.   Nurse to notify provider when observation goals have been met and patient is ready for discharge.  Denies pain. Ativan x1. Resting comfortably. Troponin negative x3. Echo stress in AM.

## 2017-09-13 NOTE — PROGRESS NOTES
Pre-procedure:    Initial vital signs: /98, HR71, RR 15  Allergies reviewed: none   Rhythm: Sinus  Medications taken within 48 hours of procedure:    Last Caffeine: none  Lung sounds: CTA, no wheezing, crackles or rtx  Health History (COPD, Asthma, etc): none    Procedure: Lexiscan  Reaction/symptoms after receiving Vibha injection: Shortness of breath  Intensity of Pain: none  1. Vital Signs:/77, HR 92, RR 15  2. Vital Signs:/84, HR 91, RR 15    Reversal agent: N/A    Post:   Resolution of symptoms?: YES  Vital signs: /84, HR 88, RR 15  Walk: NO  Comment:   Return to Radiology

## 2017-09-13 NOTE — PLAN OF CARE
Problem: Discharge Planning  Goal: Discharge Planning (Adult, OB, Behavioral, Peds)  Outcome: Improving  PRIMARY DIAGNOSIS: CHEST PAIN  OUTPATIENT/OBSERVATION GOALS TO BE MET BEFORE DISCHARGE:  1. Ruled out acute coronary syndrome (negative or stable Troponin):  No, awaiting rena scan results  2. Pain Status: Pain free.  3. Appropriate provocative testing performed: Yes  - Stress Test Procedure: Lexiscan  - Interpretation of cardiac rhythm per telemetry tech: SR with HR 68     4. Cleared by Consultants (if applicable):No  5. Return to near baseline physical activity: Yes  Discharge Planner Nurse   Safe discharge environment identified: Yes  Barriers to discharge: Not once medically cleared       Entered by: Erica Calderon 09/13/2017 10:00am     Please review provider order for any additional goals.   Nurse to notify provider when observation goals have been met and patient is ready for discharge.     Patient alert and oriented x4. Patient up with stand by assist and a walker at baseline, as well as here. Patient denies CP/SOB/Discomfort. Patient back from rena, and awaiting results. Per tele tech, SR with HR 68. Patient verbalizes anxiety, which is baseline for patient, educated on coping skills, patient accepting.

## 2017-09-14 NOTE — DISCHARGE SUMMARY
[facility] Outpatient / Observation Unit  Discharge Summary        Pablo Fuentes MRN# [id number]   YOB: 1940 Age: 76 year old     Date of Admission:  9/12/2017  Date of Discharge:  9/13/2017  2:48 PM  Admitting Physician:  Shabbir Bailey MD  Discharge Physician: Erin Nelson PA-C  Discharging Service: Hospitalist      Primary Provider: Isaac Nunez  Primary Care Physician Phone Number: [phone]         Primary Discharge Diagnoses:    Pablo Fuentes was admitted on 9/12/2017 for concerns of acute chest pain.     1. Chest pain: Ruled out ACS.   -Suspect musculoskeletal in etiology. Patient developed sharp reproducible left-sided chest pain with radiation to axilla while lying in bed. Of note he was using his upper extremities on an exercise machine for 55 minutes the day prior with no chest pain, shortness of breath, or exertional symptoms. His lexiscan demonstrated a very small area of basal and mid anterolateral nontransmural infarction in a very small area of mild basal inferior  ischemia. Doubt this is the cause of his reproducible chest wall pain. Attempted to contact his cardiologist, Dr. Sánchez, but he is out of the office this week. He will follow up with Dr. Sánchez in the outpatient setting within the next month.          Secondary Discharge Diagnoses:     Past Medical History:   Diagnosis Date     Hypertension                 Code Status:      Full Code        Brief Hospital Summary:        Reason for your hospital stay       You were evaluated in the hospital for chest pain. It appears to be very   consistent with a musculoskeletal strain.  You had a cardiac workup done   here. Your EKG and overnight heart rhythm monitoring were normal. Your lab   work was completely normal, indicating that you did not have a heart   attack associated with this episode of chest pain. Your lexiscan showed a   very small area that represented a previous heart attack and a very small   area of mild decreased  perfusion to a small part of the heart. This very   likely not the cause of your chest pain. We discussed your lexiscan stress   test findings with one of Dr. Benites' colleagues to ensure that you were   safe for discharge. You should continue the medications you are on and   contact the cardiology clinic to follow up with Dr. Benites in the next month   or so. Follow up with your primary care provider in the next week for   hospital follow up and to discuss possibly adjusting your anxiety   medications if felt appropriate.                    Please refer to initial admission history and physical for further details.   Briefly, Pablo Fuentes was admitted on 9/12/2017 for concerns of acute chest pain.   Initial work up in the ED did not reveal evidence of STEMI or findings consistent with unstable angina or acute coronary ischemia. Pt was registered to the Observation Unit for further evaluation.   Pt ruled out with serial troponins, underwent Lexiscan Stress Thallium test with results as listed below. Labs were reviewed and significant results addressed. On the day of discharge, pt was pain free, with no complaints of pain. Medications were reviewed and adjustments made as necessary. Pt is instructed to follow up as below.           Significant Labs & Imaging During Hospitalization:        Results for orders placed or performed during the hospital encounter of 09/12/17 (from the past 48 hour(s))   EKG 12 lead   Result Value Ref Range    Interpretation ECG Click View Image link to view waveform and result    CBC with platelets differential   Result Value Ref Range    WBC 5.7 4.0 - 11.0 10e9/L    RBC Count 4.40 4.4 - 5.9 10e12/L    Hemoglobin 15.1 13.3 - 17.7 g/dL    Hematocrit 43.7 40.0 - 53.0 %    MCV 99 78 - 100 fl    MCH 34.3 (H) 26.5 - 33.0 pg    MCHC 34.6 31.5 - 36.5 g/dL    RDW 12.4 10.0 - 15.0 %    Platelet Count 149 (L) 150 - 450 10e9/L    Diff Method Automated Method     % Neutrophils 60.4 %    % Lymphocytes 26.9  %    % Monocytes 9.6 %    % Eosinophils 1.9 %    % Basophils 0.7 %    % Immature Granulocytes 0.5 %    Nucleated RBCs 0 0 /100    Absolute Neutrophil 3.5 1.6 - 8.3 10e9/L    Absolute Lymphocytes 1.5 0.8 - 5.3 10e9/L    Absolute Monocytes 0.6 0.0 - 1.3 10e9/L    Absolute Eosinophils 0.1 0.0 - 0.7 10e9/L    Absolute Basophils 0.0 0.0 - 0.2 10e9/L    Abs Immature Granulocytes 0.0 0 - 0.4 10e9/L    Absolute Nucleated RBC 0.0    Basic metabolic panel   Result Value Ref Range    Sodium 139 133 - 144 mmol/L    Potassium 4.2 3.4 - 5.3 mmol/L    Chloride 107 94 - 109 mmol/L    Carbon Dioxide 27 20 - 32 mmol/L    Anion Gap 5 3 - 14 mmol/L    Glucose 100 (H) 70 - 99 mg/dL    Urea Nitrogen 22 7 - 30 mg/dL    Creatinine 0.90 0.66 - 1.25 mg/dL    GFR Estimate 82 >60 mL/min/1.7m2    GFR Estimate If Black >90 >60 mL/min/1.7m2    Calcium 8.7 8.5 - 10.1 mg/dL   Troponin I   Result Value Ref Range    Troponin I ES <0.015 0.000 - 0.045 ug/L   INR   Result Value Ref Range    INR 0.95 0.86 - 1.14   Partial thromboplastin time   Result Value Ref Range    PTT 27 22 - 37 sec   Nt probnp inpatient (BNP)   Result Value Ref Range    N-Terminal Pro BNP Inpatient 590 0 - 1800 pg/mL   XR Chest 2 Views    Narrative    XR CHEST 2 VW 9/12/2017 1:58 PM    COMPARISON: 4/2/2017    HISTORY: Chest pain.      Impression    IMPRESSION: Cardiac silhouette and pulmonary vasculature are within  normal limits. No focal airspace disease, pleural effusion or  pneumothorax.    BAYRON FELIZ MD   EKG 12-lead, tracing only   Result Value Ref Range    Interpretation ECG Click View Image link to view waveform and result    EKG 12 lead   Result Value Ref Range    Interpretation ECG Click View Image link to view waveform and result    Troponin I   Result Value Ref Range    Troponin I ES <0.015 0.000 - 0.045 ug/L   Troponin I   Result Value Ref Range    Troponin I ES <0.015 0.000 - 0.045 ug/L   Troponin I   Result Value Ref Range    Troponin I ES <0.015 0.000 - 0.045  ug/L   NM Lexiscan stress test (nuc card)    Narrative    GATED MYOCARDIAL PERFUSION SCINTIGRAPHY WITH INTRAVENOUS PHARMACOLOGIC  VASODILATATION LEXISCAN -ONE DAY STUDY     9/13/2017 9:33 AM  LUH ZELAYA  76 years  Male  1940.    Indication/Clinical History: Chest pain    Impression  1.  Myocardial perfusion imaging using single isotope technique  demonstrated a very small area of basal and mid anterolateral  nontransmural infarction in a very small area of mild basal inferior  ischemia.   2. Gated images demonstrated normal wall motion and normal left  ventricular chamber size.  The left ventricular systolic function is  normal, with an ejection fraction of 78%.  3. No prior study available for comparison    Procedure  Pharmacologic stress testing was performed with Lexiscan at a rate of  0.08 mg/ml rapid bolus injection, for 15 seconds, 0.4 mg/5ml  intravenously. Low-level exercise was not performed along with the  vasodilator infusion.  The heart rate was 71 at baseline and estuardo to  92 beats per minute during the Lexiscan infusion. The rest blood  pressure was 163/98 mmHg and was 148/84 mm Hg during Lexiscan  infusion. The patient experienced shortness of breath  during the  test.    Myocardial perfusion imaging was performed at rest, approximately 45  minutes after the injection intravenously of 10.8 mCi of Tc-99m  Myoview. At peak pharmacologic effect, 10-20 seconds after Lexiscan,   the patient was injected intravenously with 30.5 mCi of  Tc-99m  Myoview. The post-stress tomographic imaging was performed  approximately 60 minutes after stress.    EKG Findings  The resting EKG demonstrated normal sinus rhythm with minor  nonspecific ST and T-wave abnormalities inferiorly. The stress EKG  demonstrated no significant changes. Occasional PVCs were noted.    Tomographic Findings  Overall, the study quality is fair . On the stress images, there is a  small area of mild count depression involving the basal and  mid  anterolateral wall and moderate count depression involving the basal  inferior wall. On the rest images, there is mild basal inferior count  depression and a small area of mild count depression involving the  basal and mid anterolateral wall . Gated images demonstrated normal  wall motion and normal left ventricular chamber size. The left  ventricular ejection fraction was calculated to be 78%. TID was not  present.    SALAS LAGUNA MD           Pending Results:        Unresulted Labs Ordered in the Past 30 Days of this Admission     No orders found for last 61 day(s).              Consultations This Hospital Stay:      No consultations were requested during this admission         Discharge Instructions and Follow-Up:      Follow-up Appointments     Follow-up and recommended labs and tests        Follow up with primary care provider, Isaac Nunez, within 7 days   for hospital follow- up.  No follow up labs or test are needed.  Follow up with Dr. Benites in the next month for hospital follow up.                  Pt instructed to follow up with PCP in 7 days and with Consultant if indicated.   Follow-up Labs None        Discharge Disposition:      Discharged to home         Discharge Medications:        Discharge Medication List as of 9/13/2017  2:30 PM      CONTINUE these medications which have NOT CHANGED    Details   cyclobenzaprine (FLEXERIL) 10 MG tablet Take 10 mg by mouth 3 times daily as needed for muscle spasms, Historical      citalopram (CELEXA) 20 MG tablet Take 20 mg by mouth daily, Historical      lisinopril (PRINIVIL/ZESTRIL) 10 MG tablet Take 0.5 tablets (5 mg) by mouth daily, Historical      aspirin EC 81 MG EC tablet Take 1 tablet (81 mg) by mouth daily, Disp-120 tablet, R-11, Local Print      LORazepam (ATIVAN) 0.5 MG tablet Take 1 tablet (0.5 mg) by mouth every 8 hours as needed for anxiety, Disp-21 tablet, R-0, Local Print      atorvastatin (LIPITOR) 40 MG tablet Take 1 tablet (40 mg) by  "mouth daily, Disp-30 tablet, R-3, Local Print      ticagrelor (BRILINTA) 90 MG tablet Take 1 tablet (90 mg) by mouth every 12 hours, Disp-60 tablet, R-11, Local Print      nitroglycerin (NITROSTAT) 0.4 MG sublingual tablet For chest pain place 1 tablet under the tongue every 5 minutes for 3 doses. If symptoms persist 5 minutes after 1st dose call 911., Disp-25 tablet, R-0, Local Print      Biotin (BIOTIN MAXIMUM STRENGTH) 10 MG TABS tablet Take 10,000 mcg by mouth , Historical      Multiple Vitamins-Minerals (CENTRUM SILVER) per tablet Take 1 tablet by mouth daily, Historical      HYDROcodone-acetaminophen (NORCO) 5-325 MG per tablet Take 1-2 tablets by mouth every 4 hours as needed for moderate to severe pain, Disp-20 tablet, R-0, Local Print      Metoprolol Tartrate (LOPRESSOR PO) Take 25 mg by mouth 2 times daily, Historical                 Allergies:       No Known Allergies        Condition and Physical on Discharge:      Discharge condition: Stable   Vitals: Blood pressure (!) 135/93, pulse 70, temperature 96.9  F (36.1  C), temperature source Axillary, resp. rate 20, height 1.727 m (5' 8\"), weight 77.8 kg (171 lb 9.6 oz), SpO2 98 %.  171 lbs 9.6 oz      GENERAL:  Comfortable.  PSYCH: pleasant, oriented, No acute distress.  HEENT:  PERRLA. Normal conjunctiva, normal hearing, nasal mucosa and Oropharynx are normal.  NECK:  Supple, no neck vein distention, adenopathy or bruits, normal thyroid.  HEART:  Normal S1, S2 with no murmur, no pericardial rub, gallops or S3 or S4.  LUNGS:  Clear to auscultation, normal Respiratory effort. No wheezing, rales or ronchi.  ABDOMEN:  Soft, no hepatosplenomegaly, normal bowel sounds. Non-tender, non distended.   EXTREMITIES:  Reproducible tenderness over left chest wall to palpation. He is also able to reproduce the pain by extending his left elbow or externally rotating the shoulder. No pedal edema, +2 pulses bilateral and equal.  SKIN:  Dry to touch, No rash, wound or " ulcerations.  NEUROLOGIC:  CN 2-12 intact, BL 5/5 symmetric upper and lower extremity strength, sensation is intact with no focal deficits.

## 2017-12-22 ENCOUNTER — OFFICE VISIT (OUTPATIENT)
Dept: CARDIOLOGY | Facility: CLINIC | Age: 77
End: 2017-12-22
Attending: INTERNAL MEDICINE
Payer: MEDICARE

## 2017-12-22 VITALS
WEIGHT: 178.9 LBS | HEIGHT: 68 IN | DIASTOLIC BLOOD PRESSURE: 90 MMHG | BODY MASS INDEX: 27.11 KG/M2 | HEART RATE: 56 BPM | SYSTOLIC BLOOD PRESSURE: 138 MMHG

## 2017-12-22 DIAGNOSIS — I21.4 NSTEMI (NON-ST ELEVATED MYOCARDIAL INFARCTION) (H): ICD-10-CM

## 2017-12-22 DIAGNOSIS — I25.10 ASHD (ARTERIOSCLEROTIC HEART DISEASE): Primary | ICD-10-CM

## 2017-12-22 PROCEDURE — 99214 OFFICE O/P EST MOD 30 MIN: CPT | Performed by: PHYSICIAN ASSISTANT

## 2017-12-22 NOTE — LETTER
12/22/2017    Isaac Nunez DO  [facility]   [address]  [facility] [id number]    RE: Pablo Fuentes       Dear Colleague,    CARDIOLOGY CLINIC PROGRESS NOTE     I had the pleasure of following up with Pablo Fuentes along with his wife today in the Cardiology Clinic regarding his coronary artery disease and paroxysmal atrial fibrillation.  Pablo is a very pleasant 77 year-old gentleman who is a patient of Dr. Benites, having established care with him during his admission in April 2017.       Pablo has a history of hypertension, hyperlipidemia, chronic knee pain, chronic back pain, admission for small-bowel obstruction with lysis of adhesions, recently diagnosed coronary artery disease and paroxysmal atrial fibrillation.      He was admitted to [facility] 04/02/2017 with chest discomfort and emesis.  Troponin was elevated and peaked at 13.  An echocardiogram was performed that showed LVEF of 60% with basal lateral hypokinesis.  There was grade I or early diastolic dysfunction.  There was mild-to-moderate LVH.  No significant valve disease was detected.  Due to the findings, he underwent cardiac catheterization 04/03/2017.  He had a drug-eluting stent placed to the mid circumflex due to a subtotal occlusion.  There was a remaining 75% stenosis in a small second diagonal, moderate disease in the mid-LAD and approximately a 70% proximal RCA lesion that underwent FFR testing that was negative at 0.93.  During the hospitalization post-PCI, he had a brief episode of self-resolving atrial fibrillation.  He was discharged on event monitor for 2 weeks.  Paroxysmal episodes of atrial fibrillation were detected.  Overall, 7% burden of atrial fibrillation.  Remarkably, patient was asymptomatic during those episodes.   In follow up, we discussed his [id number] and risk of stroke.  He declined anticoagulation, and wanted to discuss with Dr. Benites in follow up.  He saw Dr. Benites 6/9/17 and  again declined anticoagulation.     He was admitted to the OBS unit 9/12/17-9/13/17 with chest pain.  This was sharp reproducible left-sided chest pain with radiation to axilla.  He ruled out for ACS. He did undergo a nuclear stress test 9/13/17.  This showed a very small area of basal and mid anterolateral nontransmural infarction in a very small area of mild basal inferior ischemia.     He presents today for follow up of the admission and general 6 month follow up.  I was able to review the cath images and nuc images with Dr. Benites today. Dr. Benites notes the small area of basal and mid anterolateral nontransmural infarction. Given the cath images and the nuc images, he recommends medical management. I reviewed with Pablo and his wife.  Pablo is very certain his pain was musculoskeletal as it occurred after increased arm weights. He has had no recurrence, no exertional CP or GARCIA. He is feeling a little stronger, though is still quite deconditioned.     ROS:  Skin:  Positive for bruising   Eyes:  Positive for    ENT:  Positive for hearing loss;postnasal drainage  Respiratory:  Positive for cough  Cardiovascular:    Positive for;lightheadedness  Gastroenterology: Positive for diarrhea  Genitourinary:  Positive for nocturia  Musculoskeletal:  Positive for joint pain;back pain;arthritis  Neurologic:  Positive for numbness or tingling of feet  Psychiatric:  Positive for anxiety  Heme/Lymph/Imm:  Positive for easy bruising  Endocrine:  Negative      PAST MEDICAL HISTORY:  Past Medical History:   Diagnosis Date     Hypertension        PAST SURGICAL HISTORY:  Past Surgical History:   Procedure Laterality Date     BRONCHOSCOPY FLEXIBLE N/A 2/13/2015    Procedure: BRONCHOSCOPY FLEXIBLE;  Surgeon: Christo Gilbert MD;  Location: [facility] OR     CT CORONARY ANGIOGRAM  04/03/2017    KHANG mid Cx NSTEMI     ESOPHAGOSCOPY, GASTROSCOPY, DUODENOSCOPY (EGD), COMBINED N/A 2/10/2015    Procedure: COMBINED ESOPHAGOSCOPY, GASTROSCOPY,  DUODENOSCOPY (EGD);  Surgeon: Christo Gilbert MD;  Location: [facility] OR     EYE SURGERY       HERNIA REPAIR       LAPAROSCOPIC ASSISTED INSERTION TUBE GASTROTOMY N/A 2/13/2015    Procedure: LAPAROSCOPIC ASSISTED INSERTION TUBE GASTROSTOMY;  Surgeon: Christo Gilbert MD;  Location: [facility] OR     LAPAROSCOPIC HERNIORRHAPHY GIANT PARAESOPHAGEAL N/A 2/13/2015    Procedure: LAPAROSCOPIC HERNIORRHAPHY GIANT PARAESOPHAGEAL;  Surgeon: Christo Gilbert MD;  Location: [facility] OR     LAPAROSCOPY DIAGNOSTIC (GENERAL) N/A 3/23/2017    Procedure: LAPAROSCOPY DIAGNOSTIC (GENERAL);  Surgeon: Ramón Garcia MD;  Location: [facility] OR       SOCIAL HISTORY:  Social History     Social History     Marital status:      Spouse name: N/A     Number of children: N/A     Years of education: N/A     Social History Main Topics     Smoking status: Former Smoker     Quit date: 1966     Smokeless tobacco: Never Used      Comment: was up to 3 packs daily      Alcohol use No     Drug use: No     Sexual activity: Not Asked     Other Topics Concern     None     Social History Narrative       FAMILY HISTORY:  History reviewed. No pertinent family history.    MEDS:   Current Outpatient Prescriptions on File Prior to Visit:  cyclobenzaprine (FLEXERIL) 10 MG tablet Take 10 mg by mouth 3 times daily as needed for muscle spasms   citalopram (CELEXA) 20 MG tablet Take 10 mg by mouth daily    lisinopril (PRINIVIL/ZESTRIL) 10 MG tablet Take 0.5 tablets (5 mg) by mouth daily   aspirin EC 81 MG EC tablet Take 1 tablet (81 mg) by mouth daily   LORazepam (ATIVAN) 0.5 MG tablet Take 1 tablet (0.5 mg) by mouth every 8 hours as needed for anxiety (Patient taking differently: Take 1 mg by mouth every 8 hours as needed for anxiety )   atorvastatin (LIPITOR) 40 MG tablet Take 1 tablet (40 mg) by mouth daily   ticagrelor (BRILINTA) 90 MG tablet Take 1 tablet (90 mg) by mouth every 12 hours   nitroglycerin (NITROSTAT) 0.4 MG sublingual tablet For chest  "pain place 1 tablet under the tongue every 5 minutes for 3 doses. If symptoms persist 5 minutes after 1st dose call 911.   Biotin (BIOTIN MAXIMUM STRENGTH) 10 MG TABS tablet Take 10,000 mcg by mouth    Multiple Vitamins-Minerals (CENTRUM SILVER) per tablet Take 1 tablet by mouth daily   Metoprolol Tartrate (LOPRESSOR PO) Take 25 mg by mouth 2 times daily     No current facility-administered medications on file prior to visit.     ALLERGIES: No Known Allergies    PHYSICAL EXAM:  Vitals: /90 (BP Location: Right arm, Patient Position: Sitting, Cuff Size: Adult Regular)  Pulse 56  Ht 1.727 m (5' 8\")  Wt 81.1 kg (178 lb 14.4 oz)  BMI 27.2 kg/m2  Constitutional:  cooperative, alert and oriented, well developed, well nourished, in no acute distress   appears stronger    Skin:  warm and dry to the touch        Head:  normocephalic        Eyes:  pupils equal and round;sclera white        ENT:  no pallor or cyanosis        Neck:  not assessed this visit        Chest:  clear to auscultation        Cardiac: regular rhythm frequent premature beats           no significant murmurs appreciated    Abdomen:  abdomen soft;BS normoactive;non-tender        Vascular: not assessed this visit                                      Extremities and Back:  no edema    deconditioned but stronger appearing     Neurological:      difficulty walking, rides in wheel chair      LABS/DATA:  I reviewed the following:  Nuc stress test 9/13/17:  Impression        1.  Myocardial perfusion imaging using single isotope technique  demonstrated a very small area of basal and mid anterolateral  nontransmural infarction in a very small area of mild basal inferior  ischemia.   2. Gated images demonstrated normal wall motion and normal left  ventricular chamber size.  The left ventricular systolic function is  normal, with an ejection fraction of 78%.  3. No prior study available for comparison     ASSESSMENT/PLAN:  1.  Coronary artery disease.   a.  " 04/03/2017 NSTEMI, coronary angiogram status post drug-eluting stent to the mid circ for a subtotal occlusion.  There was remaining moderate disease in the small LAD and a 75% stenosis in the second diagonal.  There was a 70% proximal RCA with a negative FFR.   b.   Nuc 9/13/17 done for chest pain, though musculoskeletal reproducible chest pain, showed very small area of basal and mid anterolateral nontransmural infarction.    c.  He remains on aspirin 81 mg daily and Brilinta 90 mg twice daily, metoprolol, lisinopril, atorvastatin 40 mg daily.   d.  No  anginal pain, shortness of breath, palpitations or heart failure symptoms.   e.  If anginal pain, would start Imdur/Ranexa.   f.  See Dr. Benites in April before coming off Brilinta. Also he wants to have knee surgery in April, so will need preoperative evaluation.     2.   Paroxysmal atrial fibrillation.   a.  He had an episode during his hospitalization, but he spontaneously converted.  The Zio Patch monitor was worn for 2 weeks following discharge, and this showed paroxysmal atrial fibrillation with RVR.  There was a 7% AFib burden.  His average heart rate when in AFib was 87.  He is on metoprolol 25 mg twice daily.   b.  CHADS2-VASc score is at least 4 (age 1, coronary artery disease, hypertension).  He was initially started on Eliquis in the hospital, but it was discontinued due to the 1 time episode.  He currently is on aspirin and Brilinta for his recent stents.  He has declined anticoagulation on multiple visits, including today.   c.  Asymptomatic.     3.  Dyslipidemia, treated with atorvastatin 40 mg daily. FLP/ALT next April.     4.  Hypertension, blood pressure currently controlled.           Thank you very much for allowing me to participate in the care of Pablo Fuentes.     Lily Jacobo PA-C    Thank you for allowing me to participate in the care of your patient.    Sincerely,     Lily Jacobo PA-C     [facility]

## 2017-12-22 NOTE — PROGRESS NOTES
CARDIOLOGY CLINIC PROGRESS NOTE    DOS: 2017    Pablo Fuentes  : 1940, 77 year old  MRN: [id number]      History:   I had the pleasure of following up with Pablo Fuentes along with his wife today in the Cardiology Clinic regarding his coronary artery disease and paroxysmal atrial fibrillation.  Pablo is a very pleasant 77 year-old gentleman who is a patient of Dr. Benites, having established care with him during his admission in 2017.       Pablo has a history of hypertension, hyperlipidemia, chronic knee pain, chronic back pain, admission for small-bowel obstruction with lysis of adhesions, recently diagnosed coronary artery disease and paroxysmal atrial fibrillation.      He was admitted to [facility] 2017 with chest discomfort and emesis.  Troponin was elevated and peaked at 13.  An echocardiogram was performed that showed LVEF of 60% with basal lateral hypokinesis.  There was grade I or early diastolic dysfunction.  There was mild-to-moderate LVH.  No significant valve disease was detected.  Due to the findings, he underwent cardiac catheterization 2017.  He had a drug-eluting stent placed to the mid circumflex due to a subtotal occlusion.  There was a remaining 75% stenosis in a small second diagonal, moderate disease in the mid-LAD and approximately a 70% proximal RCA lesion that underwent FFR testing that was negative at 0.93.  During the hospitalization post-PCI, he had a brief episode of self-resolving atrial fibrillation.  He was discharged on event monitor for 2 weeks.  Paroxysmal episodes of atrial fibrillation were detected.  Overall, 7% burden of atrial fibrillation.  Remarkably, patient was asymptomatic during those episodes.  In follow up, we discussed his [id number] and risk of stroke.  He declined anticoagulation, and wanted to discuss with Dr. Benites in follow up.  He saw Dr. Benites 17 and again declined anticoagulation.     He was admitted to the OBS unit  9/12/17-9/13/17 with chest pain.  This was sharp reproducible left-sided chest pain with radiation to axilla.  He ruled out for ACS. He did undergo a nuclear stress test 9/13/17.  This showed a very small area of basal and mid anterolateral nontransmural infarction in a very small area of mild basal inferior ischemia.     He presents today for follow up of the admission and general 6 month follow up.  I was able to review the cath images and nuc images with Dr. Benites today. Dr. Benites notes the small area of basal and mid anterolateral nontransmural infarction. Given the cath images and the nuc images, he recommends medical management. I reviewed with Pablo and his wife.  Pablo is very certain his pain was musculoskeletal as it occurred after increased arm weights. He has had no recurrence, no exertional CP or GARCIA. He is feeling a little stronger, though is still quite deconditioned.     ROS:  Skin:  Positive for bruising   Eyes:  Positive for    ENT:  Positive for hearing loss;postnasal drainage  Respiratory:  Positive for cough  Cardiovascular:    Positive for;lightheadedness  Gastroenterology: Positive for diarrhea  Genitourinary:  Positive for nocturia  Musculoskeletal:  Positive for joint pain;back pain;arthritis  Neurologic:  Positive for numbness or tingling of feet  Psychiatric:  Positive for anxiety  Heme/Lymph/Imm:  Positive for easy bruising  Endocrine:  Negative      PAST MEDICAL HISTORY:  Past Medical History:   Diagnosis Date     Hypertension        PAST SURGICAL HISTORY:  Past Surgical History:   Procedure Laterality Date     BRONCHOSCOPY FLEXIBLE N/A 2/13/2015    Procedure: BRONCHOSCOPY FLEXIBLE;  Surgeon: Christo Gilbert MD;  Location: [facility] OR     CT CORONARY ANGIOGRAM  04/03/2017    KHANG mid Cx NSTEMI     ESOPHAGOSCOPY, GASTROSCOPY, DUODENOSCOPY (EGD), COMBINED N/A 2/10/2015    Procedure: COMBINED ESOPHAGOSCOPY, GASTROSCOPY, DUODENOSCOPY (EGD);  Surgeon: Christo Gilbert MD;  Location:  [facility] OR     EYE SURGERY       HERNIA REPAIR       LAPAROSCOPIC ASSISTED INSERTION TUBE GASTROTOMY N/A 2/13/2015    Procedure: LAPAROSCOPIC ASSISTED INSERTION TUBE GASTROSTOMY;  Surgeon: Christo Gilbert MD;  Location: [facility] OR     LAPAROSCOPIC HERNIORRHAPHY GIANT PARAESOPHAGEAL N/A 2/13/2015    Procedure: LAPAROSCOPIC HERNIORRHAPHY GIANT PARAESOPHAGEAL;  Surgeon: Christo Gilbert MD;  Location: [facility] OR     LAPAROSCOPY DIAGNOSTIC (GENERAL) N/A 3/23/2017    Procedure: LAPAROSCOPY DIAGNOSTIC (GENERAL);  Surgeon: Ramón Garcia MD;  Location: [facility] OR       SOCIAL HISTORY:  Social History     Social History     Marital status:      Spouse name: N/A     Number of children: N/A     Years of education: N/A     Social History Main Topics     Smoking status: Former Smoker     Quit date: 1966     Smokeless tobacco: Never Used      Comment: was up to 3 packs daily      Alcohol use No     Drug use: No     Sexual activity: Not Asked     Other Topics Concern     None     Social History Narrative       FAMILY HISTORY:  History reviewed. No pertinent family history.    MEDS:   Current Outpatient Prescriptions on File Prior to Visit:  cyclobenzaprine (FLEXERIL) 10 MG tablet Take 10 mg by mouth 3 times daily as needed for muscle spasms   citalopram (CELEXA) 20 MG tablet Take 10 mg by mouth daily    lisinopril (PRINIVIL/ZESTRIL) 10 MG tablet Take 0.5 tablets (5 mg) by mouth daily   aspirin EC 81 MG EC tablet Take 1 tablet (81 mg) by mouth daily   LORazepam (ATIVAN) 0.5 MG tablet Take 1 tablet (0.5 mg) by mouth every 8 hours as needed for anxiety (Patient taking differently: Take 1 mg by mouth every 8 hours as needed for anxiety )   atorvastatin (LIPITOR) 40 MG tablet Take 1 tablet (40 mg) by mouth daily   ticagrelor (BRILINTA) 90 MG tablet Take 1 tablet (90 mg) by mouth every 12 hours   nitroglycerin (NITROSTAT) 0.4 MG sublingual tablet For chest pain place 1 tablet under the tongue every 5 minutes for 3 doses. If  "symptoms persist 5 minutes after 1st dose call 911.   Biotin (BIOTIN MAXIMUM STRENGTH) 10 MG TABS tablet Take 10,000 mcg by mouth    Multiple Vitamins-Minerals (CENTRUM SILVER) per tablet Take 1 tablet by mouth daily   Metoprolol Tartrate (LOPRESSOR PO) Take 25 mg by mouth 2 times daily     No current facility-administered medications on file prior to visit.     ALLERGIES: No Known Allergies    PHYSICAL EXAM:  Vitals: /90 (BP Location: Right arm, Patient Position: Sitting, Cuff Size: Adult Regular)  Pulse 56  Ht 1.727 m (5' 8\")  Wt 81.1 kg (178 lb 14.4 oz)  BMI 27.2 kg/m2  Constitutional:  cooperative, alert and oriented, well developed, well nourished, in no acute distress   appears stronger    Skin:  warm and dry to the touch        Head:  normocephalic        Eyes:  pupils equal and round;sclera white        ENT:  no pallor or cyanosis        Neck:  not assessed this visit        Chest:  clear to auscultation        Cardiac: regular rhythm frequent premature beats           no significant murmurs appreciated    Abdomen:  abdomen soft;BS normoactive;non-tender        Vascular: not assessed this visit                                      Extremities and Back:  no edema    deconditioned but stronger appearing     Neurological:      difficulty walking, rides in wheel chair      LABS/DATA:  I reviewed the following:  Nuc stress test 9/13/17:  Impression  1.  Myocardial perfusion imaging using single isotope technique  demonstrated a very small area of basal and mid anterolateral  nontransmural infarction in a very small area of mild basal inferior  ischemia.   2. Gated images demonstrated normal wall motion and normal left  ventricular chamber size.  The left ventricular systolic function is  normal, with an ejection fraction of 78%.  3. No prior study available for comparison         ASSESSMENT/PLAN:  1.  Coronary artery disease.   a.  04/03/2017 NSTEMI, coronary angiogram status post drug-eluting stent to " the mid circ for a subtotal occlusion.  There was remaining moderate disease in the small LAD and a 75% stenosis in the second diagonal.  There was a 70% proximal RCA with a negative FFR.   b.  Nuc 9/13/17 done for chest pain, though musculoskeletal reproducible chest pain, showed very small area of basal and mid anterolateral nontransmural infarction.    c.  He remains on aspirin 81 mg daily and Brilinta 90 mg twice daily, metoprolol, lisinopril, atorvastatin 40 mg daily.   d.  No anginal pain, shortness of breath, palpitations or heart failure symptoms.   e.  If anginal pain, would start Imdur/Ranexa.   f.  See Dr. Benites in April before coming off Brilinta. Also he wants to have knee surgery in April, so will need preoperative evaluation.     2.  Paroxysmal atrial fibrillation.   a.  He had an episode during his hospitalization, but he spontaneously converted.  The Zio Patch monitor was worn for 2 weeks following discharge, and this showed paroxysmal atrial fibrillation with RVR.  There was a 7% AFib burden.  His average heart rate when in AFib was 87.  He is on metoprolol 25 mg twice daily.   b.  CHADS2-VASc score is at least 4 (age 1, coronary artery disease, hypertension).  He was initially started on Eliquis in the hospital, but it was discontinued due to the 1 time episode.  He currently is on aspirin and Brilinta for his recent stents.  He has declined anticoagulation on multiple visits, including today.   c.  Asymptomatic.     3.  Dyslipidemia, treated with atorvastatin 40 mg daily. FLP/ALT next April.     4.  Hypertension, blood pressure currently controlled.           Thank you very much for allowing me to participate in the care of Pablo Fuentes.     Lily Jacobo PA-C

## 2017-12-22 NOTE — PATIENT INSTRUCTIONS
If you have chest pain, try the nitro under the tongue. If it helps, and goes away, you can call us and we can start a medication called Imdur.   If the pain is severe, don't wait to call us, rather go in or call 911.

## 2017-12-22 NOTE — MR AVS SNAPSHOT
After Visit Summary   12/22/2017    Pablo Fuentes    MRN: [id number]           Patient Information     Date Of Birth          1940        Visit Information        Provider Department      12/22/2017 3:00 PM Lily Jacobo PA-C [facility]        Today's Diagnoses     ASHD (arteriosclerotic heart disease)    -  1    NSTEMI (non-ST elevated myocardial infarction) (H)          Care Instructions    If you have chest pain, try the nitro under the tongue. If it helps, and goes away, you can call us and we can start a medication called Imdur.   If the pain is severe, don't wait to call us, rather go in or call 911.           Follow-ups after your visit        Additional Services     Follow-Up with Cardiologist                 Future tests that were ordered for you today     Open Future Orders        Priority Expected Expires Ordered    Follow-Up with Cardiologist Routine 4/21/2018 12/22/2018 12/22/2017    Basic metabolic panel Routine 4/21/2018 12/22/2018 12/22/2017    Lipid Profile Routine 4/21/2018 12/22/2018 12/22/2017    ALT Routine 4/21/2018 12/22/2018 12/22/2017            Who to contact     If you have questions or need follow up information about today's clinic visit or your schedule please contact [facility] directly at [phone].  Normal or non-critical lab and imaging results will be communicated to you by MyChart, letter or phone within 4 business days after the clinic has received the results. If you do not hear from us within 7 days, please contact the clinic through MyChart or phone. If you have a critical or abnormal lab result, we will notify you by phone as soon as possible.  Submit refill requests through Register My InfoÂ® or call your pharmacy and they will forward the refill request to us. Please allow 3 business days for your refill to be completed.          Additional Information About Your Visit       "  MyChart Information     JAZIO lets you send messages to your doctor, view your test results, renew your prescriptions, schedule appointments and more. To sign up, go to [email or web address] . Click on \"Log in\" on the left side of the screen, which will take you to the Welcome page. Then click on \"Sign up Now\" on the right side of the page.     You will be asked to enter the access code listed below, as well as some personal information. Please follow the directions to create your username and password.     Your access code is: [email or web address]  Expires: 3/22/2018  3:39 PM     Your access code will  in 90 days. If you need help or a new code, please call your [place] clinic or [phone].        Care EveryWhere ID     This is your Care EveryWhere ID. This could be used by other organizations to access your [place] medical records  [id number]        Your Vitals Were     Pulse Height BMI (Body Mass Index)             56 1.727 m (5' 8\") 27.2 kg/m2          Blood Pressure from Last 3 Encounters:   17 138/90   17 (!) 135/93   17 124/78    Weight from Last 3 Encounters:   17 81.1 kg (178 lb 14.4 oz)   17 77.8 kg (171 lb 9.6 oz)   17 78 kg (172 lb)              We Performed the Following     Follow-Up with Cardiac Advanced Practice Provider          Today's Medication Changes          These changes are accurate as of: 17  3:40 PM.  If you have any questions, ask your nurse or doctor.               These medicines have changed or have updated prescriptions.        Dose/Directions    LORazepam 0.5 MG tablet   Commonly known as:  ATIVAN   This may have changed:  how much to take   Used for:  Anxiety        Dose:  0.5 mg   Take 1 tablet (0.5 mg) by mouth every 8 hours as needed for anxiety   Quantity:  21 tablet   Refills:  0                Primary Care Provider Office Phone # Fax #    Isaac PAULETTE Nunez [phone]       [facility] [id number] " ISABELL ZUNIGA  [facility] [address]        Equal Access to Services     HENRIBRADEN JUDITH : Hadii aad ku hadtianaalyssa Adelachiara, watodda imtiaz, qacasita kareidscottie ordonez, duncan aldrichrupeshquan prado. So [facility] [phone].    ATENCIÓN: Si habla español, tiene a rose disposición servicios gratuitos de asistencia lingüística. Llame al [phone].    We comply with applicable federal civil rights laws and [place] laws. We do not discriminate on the basis of race, color, national origin, age, disability, sex, sexual orientation, or gender identity.            Thank you!     Thank you for choosing [facility]  for your care. Our goal is always to provide you with excellent care. Hearing back from our patients is one way we can continue to improve our services. Please take a few minutes to complete the written survey that you may receive in the mail after your visit with us. Thank you!             Your Updated Medication List - Protect others around you: Learn how to safely use, store and throw away your medicines at www.disposemymeds.org.          This list is accurate as of: 12/22/17  3:40 PM.  Always use your most recent med list.                   Brand Name Dispense Instructions for use Diagnosis    aspirin 81 MG EC tablet     120 tablet    Take 1 tablet (81 mg) by mouth daily    NSTEMI (non-ST elevated myocardial infarction) (H)       atorvastatin 40 MG tablet    LIPITOR    30 tablet    Take 1 tablet (40 mg) by mouth daily    NSTEMI (non-ST elevated myocardial infarction) (H)       BIOTIN MAXIMUM STRENGTH 10 MG Tabs tablet   Generic drug:  Biotin      Take 10,000 mcg by mouth        CENTRUM SILVER per tablet      Take 1 tablet by mouth daily        citalopram 20 MG tablet    celeXA     Take 10 mg by mouth daily        cyclobenzaprine 10 MG tablet    FLEXERIL     Take 10 mg by mouth 3 times daily as needed for muscle spasms        lisinopril 10 MG tablet     PRINIVIL/ZESTRIL     Take 0.5 tablets (5 mg) by mouth daily    NSTEMI (non-ST elevated myocardial infarction) (H), Essential hypertension       LOPRESSOR PO      Take 25 mg by mouth 2 times daily        LORazepam 0.5 MG tablet    ATIVAN    21 tablet    Take 1 tablet (0.5 mg) by mouth every 8 hours as needed for anxiety    Anxiety       nitroGLYcerin 0.4 MG sublingual tablet    NITROSTAT    25 tablet    For chest pain place 1 tablet under the tongue every 5 minutes for 3 doses. If symptoms persist 5 minutes after 1st dose call 911.    NSTEMI (non-ST elevated myocardial infarction) (H)       ticagrelor 90 MG tablet    BRILINTA    60 tablet    Take 1 tablet (90 mg) by mouth every 12 hours    NSTEMI (non-ST elevated myocardial infarction) (H)

## 2018-02-27 LAB
ALBUMIN SERPL-MCNC: 4.1 G/DL (ref 3.4–5)
ALP SERPL-CCNC: 107 U/L (ref 0–116)
ALT SERPL-CCNC: 38 U/L (ref 0–78)
ANION GAP SERPL CALCULATED.3IONS-SCNC: 13.5 MMOL/L (ref 9–19)
AST SERPL-CCNC: 26 U/L (ref 0–37)
BILIRUB SERPL-MCNC: 0.88 MG/DL (ref 0.2–1)
BILIRUBIN DIRECT: 0.25 MG/DL (ref 0–0.2)
BUN SERPL-MCNC: 27 MG/DL (ref 7–18)
CALCIUM SERPL-MCNC: 8.9 MG/DL (ref 8.5–10.1)
CHLORIDE SERPLBLD-SCNC: 105 MMOL/L (ref 98–107)
CHOLEST SERPL-MCNC: 97 MG/DL (ref 0–200)
CHOLEST/HDLC SERPL: 1.9 {RATIO}
CO2 SERPL-SCNC: 29.4 MMOL/L (ref 21–32)
CREAT SERPL-MCNC: 0.95 MG/DL (ref 0.6–1.3)
GFR SERPL CREATININE-BSD FRML MDRD: ABNORMAL ML/MIN/1.73M2
GLUCOSE SERPL-MCNC: 103 MG/DL (ref 70–99)
HDLC SERPL-MCNC: 52 MG/DL (ref 40–96)
LDLC SERPL CALC-MCNC: 27 MG/DL (ref 0–130)
NONHDLC SERPL-MCNC: NORMAL MG/DL
POTASSIUM SERPL-SCNC: 4.9 MMOL/L (ref 3.5–5.1)
PROT SERPL-MCNC: 6.6 G/DL (ref 6.4–8.2)
SODIUM SERPL-SCNC: 143 MMOL/L (ref 136–145)
TRIGL SERPL-MCNC: 90 MG/DL (ref 30–200)
VLDL - CALC: 18 CALC (ref 5–40)

## 2018-05-22 ENCOUNTER — TRANSFERRED RECORDS (OUTPATIENT)
Dept: HEALTH INFORMATION MANAGEMENT | Facility: CLINIC | Age: 78
End: 2018-05-22

## 2018-05-22 LAB
ERYTHROCYTE [DISTWIDTH] IN BLOOD BY AUTOMATED COUNT: 12.4 % (ref 11.5–14.5)
HCT VFR BLD AUTO: 47 % (ref 40–51)
HEMOGLOBIN: 16.2 G/DL (ref 14–17)
MCH RBC QN AUTO: 34.2 PG (ref 26.5–33)
MCHC RBC AUTO-ENTMCNC: 34.5 G/DL (ref 31.5–36.5)
MCV RBC AUTO: 99.2 FL (ref 78–99)
PLATELET # BLD AUTO: 175 10^9/L (ref 150–430)
RBC # BLD AUTO: 4.74 10^12/L (ref 4.4–5.7)
WBC # BLD AUTO: 6.5 10^9/L (ref 4–10)

## 2018-05-31 ENCOUNTER — OFFICE VISIT (OUTPATIENT)
Dept: CARDIOLOGY | Facility: CLINIC | Age: 78
End: 2018-05-31
Payer: MEDICARE

## 2018-05-31 VITALS
HEART RATE: 68 BPM | SYSTOLIC BLOOD PRESSURE: 138 MMHG | WEIGHT: 176 LBS | BODY MASS INDEX: 26.67 KG/M2 | DIASTOLIC BLOOD PRESSURE: 70 MMHG | HEIGHT: 68 IN

## 2018-05-31 DIAGNOSIS — I25.10 CORONARY ARTERY DISEASE INVOLVING NATIVE CORONARY ARTERY OF NATIVE HEART WITHOUT ANGINA PECTORIS: Primary | ICD-10-CM

## 2018-05-31 DIAGNOSIS — I21.4 NSTEMI (NON-ST ELEVATED MYOCARDIAL INFARCTION) (H): ICD-10-CM

## 2018-05-31 DIAGNOSIS — I25.10 ASHD (ARTERIOSCLEROTIC HEART DISEASE): ICD-10-CM

## 2018-05-31 PROCEDURE — 99214 OFFICE O/P EST MOD 30 MIN: CPT | Performed by: INTERNAL MEDICINE

## 2018-05-31 NOTE — PROGRESS NOTES
Service Date: 05/31/2018      HISTORY OF PRESENT ILLNESS:  Pablo Fuentes, a 77-year-old man with coronary artery disease, paroxysmal atrial fibrillation, hypertension and severe osteoarthritis awaiting knee surgery, was seen today at your request prior to elective knee surgery.      Mr. Fuentes has developed disabling knee pain and is scheduled for a total knee arthroplasty with Dr. Brannon Tripp.  The patient's surgery is scheduled for 06/06/2018 and his ticagrelor and aspirin have now been held on instruction from his orthopedic surgeon's office. He has usually been seen by my colleague  who is not available today.     The patient sustained a non-ST segment elevation MI in 04/2017 after adhesiolysis for treatment of small-bowel obstruction.  Coronary angiography showed a subtotal hazy lesion in the mid circumflex successfully treated with a 2.5 x 24 mm length everolimus-eluting Promus Premier stent.  At the time of the intervention, there was a moderate narrowing in the LAD and a proximal 60% narrowing in the right coronary with normal FFR.  An echocardiogram showed a normal ejection fraction of 60%.      The patient has remained on aspirin and ticagrelor since that intervention.He has paroxysmal  atrial fibrillation on monitoring and despite  long discussions with both Dr. Benites and our advanced level practitioner, Lily Jacobo, during which they advised anticoagulation, the patient elected against taking them and has remained on asa and ticagrelor until 5/30/2018.      There have been no other new complaints since he was last seen in our office.  He specifically denies chest, arm, neck, jaw or back discomfort, dyspnea, orthopnea, PND or palpitation.      PAST MEDICAL HISTORY:   1.  Coronary artery disease:   a.  History of non-ST segment elevation MI, status post implantation of everolimus-eluting stent in the mid circumflex.  Moderate narrowing in right coronary and LAD.  Normal ejection fraction on  echo.   2.  Paroxysmal atrial fibrillation, has declined anticoagulation.   3.  Dyslipidemia, on atorvastatin.   4.  Hypertension.   5.  Osteoarthritis.   6.  Old history of small bowel obstruction, status post adhesiolysis.      ALLERGIES:  None known.      HABITS:  The patient does not abuse tobacco or alcohol.      SOCIAL HISTORY:  The patient is  and has a very supportive wife with him here today.  There is no family history of known premature coronary disease.      REVIEW OF SYSTEMS:  A 10-point review of systems was performed.  Outside of the issues mentioned in the HPI, there are no complaints.      LABORATORY STUDIES:  His ECG from Dr. Nunez's office shows a sinus rhythm with PACs, but no acute changes.  His most recent LDL cholesterol was 27.  His most recent creatinine was 0.95.  His most recent hemoglobin 16.2 with a platelet count is about 75,000.      MEDICATIONS:   1.  Aspirin 81 daily, held since 05/30/2018.   2.  Ticagrelor 90 b.i.d., has been held since 05/30/2018.     3.  Atorvastatin 40 daily.   4.  Citalopram 10 mg a day.   5.  Lisinopril 5 mg a day.   6.  Metoprolol 25 b.i.d.   7.  Nitroglycerin sublingual.      ASSESSMENT:  This 77-year-old man with a known history of coronary disease, status post non-ST segment elevation myocardial infarction in 04/2017 treated with stent implantation is asymptomatic on guideline-directed medical therapy with satisfactory control systolic pressure and LDL cholesterol levels.  There is no further cardiac testing or intervention that will change the patient's perioperative risk for cardiovascular complication.  I have explained to the patient and his wife that we prefer that aspirin be continued perioperatively  if acceptable to his surgeon as continuing aspirin appears to decrease the risk of subacute stent thrombosis.  We have asked the patient and his wife to call Dr. Tripp's office to determine whether he would allow the patient to stay on aspirin  at the time of the surgery.  If not, both aspirin and Brilinta should be resumed  soon as acceptable to his surgeon after the operation.      I have explained that even with proper management, there is a small but definite risk of heart attack and stent thrombosis with any surgical procedure.      RECOMMENDATIONS:   1.  if at all possible, we would advise the patient stay on aspirin at the time of his upcoming surgery.  If this is not acceptable to surgeons, both aspirin and Brilinta should be restarted as soon as acceptable after the operation.   2.  No further cardiac testing is indicated prior to the patient's upcoming surgery.   3.  Follow up with Dr. Benites in about 6 months.      We greatly appreciate the opportunity to care for your patient, Luh Fuentes.      cc:      Isaac Nunez DO   [facility]   [address] [place] [address]      Brannon Tripp MD    [facility]   [address] [place] [address], [id number]   [place] [address]         ROMY ESTEVES MD             D: 2018   T: 2018   MT: RAVINDER      Name:     LUH FUENTES   MRN:      [id number]        Account:      [id number]   :      1940           Service Date: 2018      Document: [id number]

## 2018-05-31 NOTE — PROGRESS NOTES
HISTORY OF PRESENT ILLNESS:    No angina. Awaits Knee replacement 6/6. Brillanta stopped and aspirin as well.     Orders this Visit:  No orders of the defined types were placed in this encounter.    No orders of the defined types were placed in this encounter.    There are no discontinued medications.    Encounter Diagnoses   Name Primary?     NSTEMI (non-ST elevated myocardial infarction) (H)      ASHD (arteriosclerotic heart disease)        CURRENT MEDICATIONS:  Current Outpatient Prescriptions   Medication Sig Dispense Refill     aspirin EC 81 MG EC tablet Take 1 tablet (81 mg) by mouth daily (Patient taking differently: Take 81 mg by mouth daily Starting  5- on hold for surgery) 120 tablet 11     atorvastatin (LIPITOR) 40 MG tablet Take 1 tablet (40 mg) by mouth daily 30 tablet 3     Biotin (BIOTIN MAXIMUM STRENGTH) 10 MG TABS tablet Take 10,000 mcg by mouth        cyclobenzaprine (FLEXERIL) 10 MG tablet Take 10 mg by mouth 3 times daily as needed for muscle spasms       lisinopril (PRINIVIL/ZESTRIL) 10 MG tablet Take 0.5 tablets (5 mg) by mouth daily       LORazepam (ATIVAN) 0.5 MG tablet Take 1 tablet (0.5 mg) by mouth every 8 hours as needed for anxiety (Patient taking differently: Take 1 mg by mouth every 8 hours as needed for anxiety ) 21 tablet 0     Metoprolol Tartrate (LOPRESSOR PO) Take 25 mg by mouth 2 times daily       Multiple Vitamins-Minerals (CENTRUM SILVER) per tablet Take 1 tablet by mouth daily       nitroglycerin (NITROSTAT) 0.4 MG sublingual tablet For chest pain place 1 tablet under the tongue every 5 minutes for 3 doses. If symptoms persist 5 minutes after 1st dose call 911. 25 tablet 0     ticagrelor (BRILINTA) 90 MG tablet Take 1 tablet (90 mg) by mouth every 12 hours (Patient taking differently: Take 90 mg by mouth every 12 hours Starting 05- on hold for surgery) 60 tablet 11     citalopram (CELEXA) 20 MG tablet Take 10 mg by mouth daily          ALLERGIES   No Known  "Allergies    PAST MEDICAL, SURGICAL, FAMILY, SOCIAL HISTORY:  History was reviewed and updated as needed, see medical record.    Review of Systems:  A 12-point review of systems was completed, see medical record for detailed review of systems information.    Physical Exam:  Vitals: BP (!) 159/113  Pulse 68  Ht 1.727 m (5' 8\")  Wt 79.8 kg (176 lb)  BMI 26.76 kg/m2    Constitutional:  cooperative, alert and oriented, well developed, well nourished, in no acute distress        Skin:  warm and dry to the touch, no apparent skin lesions or masses noted        Head:  normocephalic, no masses or lesions        Eyes:  pupils equal and round, conjunctivae and lids unremarkable, sclera white, no xanthalasma, EOMS intact, no nystagmus        ENT:  no pallor or cyanosis, dentition good        Neck:  carotid pulses are full and equal bilaterally, JVP normal, no carotid bruit        Chest:  normal breath sounds, clear to auscultation, normal A-P diameter, normal symmetry, normal respiratory excursion, no use of accessory muscles        Cardiac: regular rhythm, normal S1/S2, no S3 or S4, apical impulse not displaced, no murmurs, gallops or rubs                  Abdomen:  abdomen soft, non-tender, BS normoactive, no mass, no HSM, no bruits        Vascular:                                        Extremities and Back:  no deformities, clubbing, cyanosis, erythema observed        Neurological:  no gross motor deficits        ASSESSMENT:  If he can take asa we prefer him to take it ade op, if not start asap after surgery       RECOMMENDATIONS: Will request continued asa  Start brillanta as soon as allowed after knee       Recent Lab Results:  LIPID RESULTS:  Lab Results   Component Value Date    CHOL 97.0 02/27/2018    HDL 52.0 02/27/2018    LDL 27.0 02/27/2018    TRIG 90.0 02/27/2018    CHOLHDLRATIO 1.9 02/27/2018       LIVER ENZYME RESULTS:  Lab Results   Component Value Date    AST 26.0 02/27/2018    ALT 38.0 02/27/2018 "       CBC RESULTS:  Lab Results   Component Value Date    WBC 6.5 05/22/2018    RBC 4.74 05/22/2018    HGB 16.2 05/22/2018    HCT 47.0 05/22/2018    MCV 99.2 (H) 05/22/2018    MCH 34.2 (H) 05/22/2018    MCHC 34.5 05/22/2018    RDW 12.4 05/22/2018     05/22/2018       BMP RESULTS:  Lab Results   Component Value Date    .0 02/27/2018    POTASSIUM 4.9 02/27/2018    CHLORIDE 105.0 02/27/2018    CO2 29.4 02/27/2018    ANIONGAP 13.5 02/27/2018    .0 (H) 02/27/2018    BUN 27.0 (H) 02/27/2018    CR 0.95 02/27/2018    GFRESTIMATED 82 09/12/2017    GFRESTBLACK >90 09/12/2017    JOANN 8.9 02/27/2018        A1C RESULTS:  Lab Results   Component Value Date    A1C 5.4 02/14/2015       INR RESULTS:  Lab Results   Component Value Date    INR 0.95 09/12/2017    INR 1.03 02/13/2015       We greatly appreciate the opportunity to be involved in the care of your patient, Pablo MARRERO Gina.    Sincerely,  Frank Nelson MD      CC  Isaac Nunez, [facility]  [address] [place] [address]

## 2018-05-31 NOTE — LETTER
5/31/2018    Isaac Nunez DO  [facility] [address]  [facility] [address]    RE: Pablo Fuentes       Dear Colleague,    I had the pleasure of seeing Pablo Fuentes in the [facility].    HISTORY OF PRESENT ILLNESS:    No angina. Awaits Knee replacement 6/6. Brillanta stopped and aspirin as well.     Orders this Visit:  No orders of the defined types were placed in this encounter.    No orders of the defined types were placed in this encounter.    There are no discontinued medications.    Encounter Diagnoses   Name Primary?     NSTEMI (non-ST elevated myocardial infarction) (H)      ASHD (arteriosclerotic heart disease)        CURRENT MEDICATIONS:  Current Outpatient Prescriptions   Medication Sig Dispense Refill     aspirin EC 81 MG EC tablet Take 1 tablet (81 mg) by mouth daily (Patient taking differently: Take 81 mg by mouth daily Starting  5- on hold for surgery) 120 tablet 11     atorvastatin (LIPITOR) 40 MG tablet Take 1 tablet (40 mg) by mouth daily 30 tablet 3     Biotin (BIOTIN MAXIMUM STRENGTH) 10 MG TABS tablet Take 10,000 mcg by mouth        cyclobenzaprine (FLEXERIL) 10 MG tablet Take 10 mg by mouth 3 times daily as needed for muscle spasms       lisinopril (PRINIVIL/ZESTRIL) 10 MG tablet Take 0.5 tablets (5 mg) by mouth daily       LORazepam (ATIVAN) 0.5 MG tablet Take 1 tablet (0.5 mg) by mouth every 8 hours as needed for anxiety (Patient taking differently: Take 1 mg by mouth every 8 hours as needed for anxiety ) 21 tablet 0     Metoprolol Tartrate (LOPRESSOR PO) Take 25 mg by mouth 2 times daily       Multiple Vitamins-Minerals (CENTRUM SILVER) per tablet Take 1 tablet by mouth daily       nitroglycerin (NITROSTAT) 0.4 MG sublingual tablet For chest pain place 1 tablet under the tongue every 5 minutes for 3 doses. If symptoms persist 5 minutes after 1st dose call 911. 25 tablet 0     ticagrelor (BRILINTA) 90 MG tablet Take 1  "tablet (90 mg) by mouth every 12 hours (Patient taking differently: Take 90 mg by mouth every 12 hours Starting 05- on hold for surgery) 60 tablet 11     citalopram (CELEXA) 20 MG tablet Take 10 mg by mouth daily          ALLERGIES   No Known Allergies    PAST MEDICAL, SURGICAL, FAMILY, SOCIAL HISTORY:  History was reviewed and updated as needed, see medical record.    Review of Systems:  A 12-point review of systems was completed, see medical record for detailed review of systems information.    Physical Exam:  Vitals: BP (!) 159/113  Pulse 68  Ht 1.727 m (5' 8\")  Wt 79.8 kg (176 lb)  BMI 26.76 kg/m2    Constitutional:  cooperative, alert and oriented, well developed, well nourished, in no acute distress        Skin:  warm and dry to the touch, no apparent skin lesions or masses noted        Head:  normocephalic, no masses or lesions        Eyes:  pupils equal and round, conjunctivae and lids unremarkable, sclera white, no xanthalasma, EOMS intact, no nystagmus        ENT:  no pallor or cyanosis, dentition good        Neck:  carotid pulses are full and equal bilaterally, JVP normal, no carotid bruit        Chest:  normal breath sounds, clear to auscultation, normal A-P diameter, normal symmetry, normal respiratory excursion, no use of accessory muscles        Cardiac: regular rhythm, normal S1/S2, no S3 or S4, apical impulse not displaced, no murmurs, gallops or rubs                  Abdomen:  abdomen soft, non-tender, BS normoactive, no mass, no HSM, no bruits        Vascular:                                        Extremities and Back:  no deformities, clubbing, cyanosis, erythema observed        Neurological:  no gross motor deficits        ASSESSMENT:  If he can take asa we prefer him to take it ade op, if not start asap after surgery       RECOMMENDATIONS: Will request continued asa  Start brillanta as soon as allowed after knee       Recent Lab Results:  LIPID RESULTS:  Lab Results   Component " Value Date    CHOL 97.0 02/27/2018    HDL 52.0 02/27/2018    LDL 27.0 02/27/2018    TRIG 90.0 02/27/2018    CHOLHDLRATIO 1.9 02/27/2018       LIVER ENZYME RESULTS:  Lab Results   Component Value Date    AST 26.0 02/27/2018    ALT 38.0 02/27/2018       CBC RESULTS:  Lab Results   Component Value Date    WBC 6.5 05/22/2018    RBC 4.74 05/22/2018    HGB 16.2 05/22/2018    HCT 47.0 05/22/2018    MCV 99.2 (H) 05/22/2018    MCH 34.2 (H) 05/22/2018    MCHC 34.5 05/22/2018    RDW 12.4 05/22/2018     05/22/2018       BMP RESULTS:  Lab Results   Component Value Date    .0 02/27/2018    POTASSIUM 4.9 02/27/2018    CHLORIDE 105.0 02/27/2018    CO2 29.4 02/27/2018    ANIONGAP 13.5 02/27/2018    .0 (H) 02/27/2018    BUN 27.0 (H) 02/27/2018    CR 0.95 02/27/2018    GFRESTIMATED 82 09/12/2017    GFRESTBLACK >90 09/12/2017    JOANN 8.9 02/27/2018        A1C RESULTS:  Lab Results   Component Value Date    A1C 5.4 02/14/2015       INR RESULTS:  Lab Results   Component Value Date    INR 0.95 09/12/2017    INR 1.03 02/13/2015       We greatly appreciate the opportunity to be involved in the care of your patient, Pablo Fuentes.    Sincerely,  Frank Nelson MD      CC  Isaac Nunez[address] [address]                                                                       Thank you for allowing me to participate in the care of your patient.      Sincerely,     Frank Nelson MD     [facility]    cc:   Isaac Nunez [address] [address]

## 2018-05-31 NOTE — LETTER
5/31/2018      Isaac Nunez DO  [facility] [address]  [facility] [address]      RE: Pablo Fuentes       Dear Colleague,    I had the pleasure of seeing Pablo Fuentes in the [facility].    Service Date: 05/31/2018      HISTORY OF PRESENT ILLNESS:  Pablo Fuentes, a 77-year-old man with coronary artery disease, paroxysmal atrial fibrillation, hypertension and severe osteoarthritis awaiting knee surgery, was seen today at your request prior to elective knee surgery.      Mr. Fuentes has developed disabling knee pain and is scheduled for a total knee arthroplasty with Dr. Brannon Tripp.  The patient's surgery is scheduled for 06/06/2018 and his ticagrelor and aspirin have now been held on instruction from his orthopedic surgeon's office. He has usually been seen by my colleague  who is not available today.     The patient sustained a non-ST segment elevation MI in 04/2017 after adhesiolysis for treatment of small-bowel obstruction.  Coronary angiography showed a subtotal hazy lesion in the mid circumflex successfully treated with a 2.5 x 24 mm length everolimus-eluting Promus Premier stent.  At the time of the intervention, there was a moderate narrowing in the LAD and a proximal 60% narrowing in the right coronary with normal FFR.  An echocardiogram showed a normal ejection fraction of 60%.      The patient has remained on aspirin and ticagrelor since that intervention.He has paroxysmal  atrial fibrillation on monitoring and despite  long discussions with both Dr. Benites and our advanced level practitioner, Lily Jacobo, during which they advised anticoagulation, the patient elected against taking them and has remained on asa and ticagrelor until 5/30/2018.      There have been no other new complaints since he was last seen in our office.  He specifically denies chest, arm, neck, jaw or back discomfort, dyspnea, orthopnea, PND or palpitation.       PAST MEDICAL HISTORY:   1.  Coronary artery disease:   a.  History of non-ST segment elevation MI, status post implantation of everolimus-eluting stent in the mid circumflex.  Moderate narrowing in right coronary and LAD.  Normal ejection fraction on echo.   2.  Paroxysmal atrial fibrillation, has declined anticoagulation.   3.  Dyslipidemia, on atorvastatin.   4.  Hypertension.   5.  Osteoarthritis.   6.  Old history of small bowel obstruction, status post adhesiolysis.      ALLERGIES:  None known.      HABITS:  The patient does not abuse tobacco or alcohol.      SOCIAL HISTORY:  The patient is  and has a very supportive wife with him here today.  There is no family history of known premature coronary disease.      REVIEW OF SYSTEMS:  A 10-point review of systems was performed.  Outside of the issues mentioned in the HPI, there are no complaints.      LABORATORY STUDIES:  His ECG from Dr. Nunez's office shows a sinus rhythm with PACs, but no acute changes.  His most recent LDL cholesterol was 27.  His most recent creatinine was 0.95.  His most recent hemoglobin 16.2 with a platelet count is about 75,000.      MEDICATIONS:   1.  Aspirin 81 daily, held since 05/30/2018.   2.  Ticagrelor 90 b.i.d., has been held since 05/30/2018.     3.  Atorvastatin 40 daily.   4.  Citalopram 10 mg a day.   5.  Lisinopril 5 mg a day.   6.  Metoprolol 25 b.i.d.   7.  Nitroglycerin sublingual.      ASSESSMENT:  This 77-year-old man with a known history of coronary disease, status post non-ST segment elevation myocardial infarction in 04/2017 treated with stent implantation is asymptomatic on guideline-directed medical therapy with satisfactory control systolic pressure and LDL cholesterol levels.  There is no further cardiac testing or intervention that will change the patient's perioperative risk for cardiovascular complication.  I have explained to the patient and his wife that we prefer that aspirin be continued  perioperatively  if acceptable to his surgeon as continuing aspirin appears to decrease the risk of subacute stent thrombosis.  We have asked the patient and his wife to call Dr. Tripp's office to determine whether he would allow the patient to stay on aspirin at the time of the surgery.  If not, both aspirin and Brilinta should be resumed  soon as acceptable to his surgeon after the operation.      I have explained that even with proper management, there is a small but definite risk of heart attack and stent thrombosis with any surgical procedure.      RECOMMENDATIONS:   1.  if at all possible, we would advise the patient stay on aspirin at the time of his upcoming surgery.  If this is not acceptable to surgeons, both aspirin and Brilinta should be restarted as soon as acceptable after the operation.   2.  No further cardiac testing is indicated prior to the patient's upcoming surgery.   3.  Follow up with Dr. Benites in about 6 months.      We greatly appreciate the opportunity to care for your patient, Luh Fuentes.      cc:      Isaac Nunez [facility]   [address] [place] [address]      Brannon Tripp MD    [facility]   [address] [place] [address], [id number]   [place] [address]         ROMY ESTEVES MD             D: 2018   T: 2018   MT: RAVINDER      Name:     LUH FUENTES   MRN:      [id number]        Account:      [id number]   :      1940           Service Date: 2018      Document: [id number]           Outpatient Encounter Prescriptions as of 2018   Medication Sig Dispense Refill     aspirin EC 81 MG EC tablet Take 1 tablet (81 mg) by mouth daily (Patient taking differently: Take 81 mg by mouth daily Starting  2018 on hold for surgery) 120 tablet 11     atorvastatin (LIPITOR) 40 MG tablet Take 1 tablet (40 mg) by mouth daily 30 tablet 3     Biotin (BIOTIN MAXIMUM STRENGTH) 10 MG TABS tablet Take 10,000 mcg by mouth         cyclobenzaprine (FLEXERIL) 10 MG tablet Take 10 mg by mouth 3 times daily as needed for muscle spasms       lisinopril (PRINIVIL/ZESTRIL) 10 MG tablet Take 0.5 tablets (5 mg) by mouth daily       LORazepam (ATIVAN) 0.5 MG tablet Take 1 tablet (0.5 mg) by mouth every 8 hours as needed for anxiety (Patient taking differently: Take 1 mg by mouth every 8 hours as needed for anxiety ) 21 tablet 0     Metoprolol Tartrate (LOPRESSOR PO) Take 25 mg by mouth 2 times daily       Multiple Vitamins-Minerals (CENTRUM SILVER) per tablet Take 1 tablet by mouth daily       nitroglycerin (NITROSTAT) 0.4 MG sublingual tablet For chest pain place 1 tablet under the tongue every 5 minutes for 3 doses. If symptoms persist 5 minutes after 1st dose call 911. 25 tablet 0     ticagrelor (BRILINTA) 90 MG tablet Take 1 tablet (90 mg) by mouth every 12 hours (Patient taking differently: Take 90 mg by mouth every 12 hours Starting 05- on hold for surgery) 60 tablet 11     citalopram (CELEXA) 20 MG tablet Take 10 mg by mouth daily        No facility-administered encounter medications on file as of 5/31/2018.        Again, thank you for allowing me to participate in the care of your patient.      Sincerely,    Frank Nelson MD     [facility]

## 2018-05-31 NOTE — MR AVS SNAPSHOT
"              After Visit Summary   5/31/2018    Pablo Fuentes    MRN: [id number]           Patient Information     Date Of Birth          1940        Visit Information        Provider Department      5/31/2018 9:30 AM Frank Nelson MD [facility]        Today's Diagnoses     Coronary artery disease involving native coronary artery of native heart without angina pectoris    -  1    NSTEMI (non-ST elevated myocardial infarction) (H)        ASHD (arteriosclerotic heart disease)           Follow-ups after your visit        Additional Services     Follow-Up with Cardiologist                 Future tests that were ordered for you today     Open Future Orders        Priority Expected Expires Ordered    Follow-Up with Cardiologist Routine 11/27/2018 5/31/2019 5/31/2018            Who to contact     If you have questions or need follow up information about today's clinic visit or your schedule please contact [facility] directly at [phone].  Normal or non-critical lab and imaging results will be communicated to you by Mobile Pulsehart, letter or phone within 4 business days after the clinic has received the results. If you do not hear from us within 7 days, please contact the clinic through Mobile Pulsehart or phone. If you have a critical or abnormal lab result, we will notify you by phone as soon as possible.  Submit refill requests through [email or web address] or call your pharmacy and they will forward the refill request to us. Please allow 3 business days for your refill to be completed.          Additional Information About Your Visit        Mobile PulseharWipster Information     [email or web address] lets you send messages to your doctor, view your test results, renew your prescriptions, schedule appointments and more. To sign up, go to [email or web address] . Click on \"Log in\" on the left side of the screen, which will take you to the Welcome page. Then click on \"Sign " "up Now\" on the right side of the page.     You will be asked to enter the access code listed below, as well as some personal information. Please follow the directions to create your username and password.     Your access code is: [email or web address]  Expires: 2018  9:56 AM     Your access code will  in 90 days. If you need help or a new code, please call your [facility] clinic or [phone].        Care EveryWhere ID     This is your Care EveryWhere ID. This could be used by other organizations to access your [facility] medical records  [id number]        Your Vitals Were     Pulse Height BMI (Body Mass Index)             68 1.727 m (5' 8\") 26.76 kg/m2          Blood Pressure from Last 3 Encounters:   18 138/70   17 138/90   17 (!) 135/93    Weight from Last 3 Encounters:   18 79.8 kg (176 lb)   17 81.1 kg (178 lb 14.4 oz)   17 77.8 kg (171 lb 9.6 oz)              We Performed the Following     Follow-Up with Cardiologist          Today's Medication Changes          These changes are accurate as of 18 10:01 AM.  If you have any questions, ask your nurse or doctor.               These medicines have changed or have updated prescriptions.        Dose/Directions    aspirin 81 MG EC tablet   This may have changed:  additional instructions   Used for:  NSTEMI (non-ST elevated myocardial infarction) (H)        Dose:  81 mg   Take 1 tablet (81 mg) by mouth daily   Quantity:  120 tablet   Refills:  11       LORazepam 0.5 MG tablet   Commonly known as:  ATIVAN   This may have changed:  how much to take   Used for:  Anxiety        Dose:  0.5 mg   Take 1 tablet (0.5 mg) by mouth every 8 hours as needed for anxiety   Quantity:  21 tablet   Refills:  0       ticagrelor 90 MG tablet   Commonly known as:  BRILINTA   This may have changed:  additional instructions   Used for:  NSTEMI (non-ST elevated myocardial infarction) (H)        Dose:  90 mg   Take 1 tablet (90 mg) by mouth every 12 " hours   Quantity:  60 tablet   Refills:  11                Primary Care Provider Office Phone # Fax #    Isaac Nunez [phone]       [facility] [address] [facility] [address]        Equal Access to Services     JUNG WONG AH: Hadii aad ku hadasho Soomaali, waaxda luqadaha, qaybta kaalmada adeegyada, waxay idiin hayaan adeeg khmaria esther laember prado. So [facility] [phone].    ATENCIÓN: Si habla español, tiene a rose disposición servicios gratuitos de asistencia lingüística. Llame al [phone].    We comply with applicable federal civil rights laws and [place] laws. We do not discriminate on the basis of race, color, national origin, age, disability, sex, sexual orientation, or gender identity.            Thank you!     Thank you for choosing [facility]  for your care. Our goal is always to provide you with excellent care. Hearing back from our patients is one way we can continue to improve our services. Please take a few minutes to complete the written survey that you may receive in the mail after your visit with us. Thank you!             Your Updated Medication List - Protect others around you: Learn how to safely use, store and throw away your medicines at www.disposemymeds.org.          This list is accurate as of 5/31/18 10:01 AM.  Always use your most recent med list.                   Brand Name Dispense Instructions for use Diagnosis    aspirin 81 MG EC tablet     120 tablet    Take 1 tablet (81 mg) by mouth daily    NSTEMI (non-ST elevated myocardial infarction) (H)       atorvastatin 40 MG tablet    LIPITOR    30 tablet    Take 1 tablet (40 mg) by mouth daily    NSTEMI (non-ST elevated myocardial infarction) (H)       BIOTIN MAXIMUM STRENGTH 10 MG Tabs tablet   Generic drug:  Biotin      Take 10,000 mcg by mouth        CENTRUM SILVER per tablet      Take 1 tablet by mouth daily        citalopram 20 MG tablet     celeXA     Take 10 mg by mouth daily        cyclobenzaprine 10 MG tablet    FLEXERIL     Take 10 mg by mouth 3 times daily as needed for muscle spasms        lisinopril 10 MG tablet    PRINIVIL/ZESTRIL     Take 0.5 tablets (5 mg) by mouth daily    NSTEMI (non-ST elevated myocardial infarction) (H), Essential hypertension       LOPRESSOR PO      Take 25 mg by mouth 2 times daily        LORazepam 0.5 MG tablet    ATIVAN    21 tablet    Take 1 tablet (0.5 mg) by mouth every 8 hours as needed for anxiety    Anxiety       nitroGLYcerin 0.4 MG sublingual tablet    NITROSTAT    25 tablet    For chest pain place 1 tablet under the tongue every 5 minutes for 3 doses. If symptoms persist 5 minutes after 1st dose call 911.    NSTEMI (non-ST elevated myocardial infarction) (H)       ticagrelor 90 MG tablet    BRILINTA    60 tablet    Take 1 tablet (90 mg) by mouth every 12 hours    NSTEMI (non-ST elevated myocardial infarction) (H)

## 2018-09-10 ENCOUNTER — NURSING HOME VISIT (OUTPATIENT)
Dept: GERIATRICS | Facility: CLINIC | Age: 78
End: 2018-09-10
Payer: MEDICARE

## 2018-09-10 VITALS
SYSTOLIC BLOOD PRESSURE: 137 MMHG | OXYGEN SATURATION: 97 % | DIASTOLIC BLOOD PRESSURE: 87 MMHG | HEART RATE: 90 BPM | HEIGHT: 68 IN | TEMPERATURE: 97.5 F | RESPIRATION RATE: 18 BRPM | BODY MASS INDEX: 26.04 KG/M2 | WEIGHT: 171.8 LBS

## 2018-09-10 DIAGNOSIS — I10 ESSENTIAL HYPERTENSION: ICD-10-CM

## 2018-09-10 DIAGNOSIS — F41.1 GAD (GENERALIZED ANXIETY DISORDER): ICD-10-CM

## 2018-09-10 DIAGNOSIS — I25.119 CORONARY ARTERY DISEASE INVOLVING NATIVE CORONARY ARTERY OF NATIVE HEART WITH ANGINA PECTORIS (H): ICD-10-CM

## 2018-09-10 DIAGNOSIS — R53.81 PHYSICAL DECONDITIONING: ICD-10-CM

## 2018-09-10 DIAGNOSIS — M17.12 PRIMARY OSTEOARTHRITIS OF LEFT KNEE: Primary | ICD-10-CM

## 2018-09-10 DIAGNOSIS — D62 ANEMIA DUE TO BLOOD LOSS, ACUTE: ICD-10-CM

## 2018-09-10 DIAGNOSIS — I48.0 PAROXYSMAL ATRIAL FIBRILLATION (H): ICD-10-CM

## 2018-09-10 DIAGNOSIS — Z96.652 STATUS POST TOTAL LEFT KNEE REPLACEMENT: ICD-10-CM

## 2018-09-10 PROBLEM — K94.22 INFLAMMATION AT GASTROSTOMY TUBE SITE (H): Status: ACTIVE | Noted: 2018-09-10

## 2018-09-10 PROCEDURE — 99310 SBSQ NF CARE HIGH MDM 45: CPT | Performed by: NURSE PRACTITIONER

## 2018-09-10 NOTE — PROGRESS NOTES
"[facility] [facility]  PRIMARY CARE PROVIDER AND CLINIC:  Isaac Nunez [place] [facility] [address] [facility] / [place] *  Chief Complaint   Patient presents with     [facility] [place] Care     [place] Medical Record Number:  [id number]    HPI:    Pablo Fuentes is a 77 year old  (1940),admitted to the [facility] from [facility].  Hospital stay 9/5/18 to 9/8/18  Admitted to this facility for  rehab, medical management and nursing care.  HPI information obtained from: facility chart records, facility staff, patient report, [facility] chart review and Care Everywhere [facility] chart review.  Current issues are:         Primary osteoarthritis of left knee  Status post total left knee replacement  Physical deconditioning  Anemia due to blood loss, acute  Essential hypertension  Paroxysmal atrial fibrillation (H)  Coronary artery disease involving native coronary artery of native heart with angina pectoris (H)  EZEQUIEL (generalized anxiety disorder)     No Hospital Discharge  available: Procedure preformed as noted below. But pt was to go home on Saturday but when ortho saw pt, he had draining incision though no infection.  It was felt he would need TCU and rehab  LAST PROGRESS NOTE ON 9/8/18    \"Pablo Fuentes is a 76 yo  male with a history of hypertension, hyperlipidemia, anxiety, degenerative joint disease, coronary artery disease, atrial fibrillation, left knee osteoarthritis who was admitted on 9/5/18 for elective left total knee arthroplasty per .     Resumed on his Brilinta, aspirin post surgery is okay per orthopedic surgery. On 9/7/18 having some bleeding noted from incision.     Principal Problem:  Primary osteoarthritis of left knee  S/p left total knee arthroplasty on 9/5/2018 per Dr. Tripp   Post op cares as per Orthopedic surgery   Pain control - as per Ortho surgery  Activity, weight bearing as per ortho   PT, OT   Bleeding from incision as " "per orthopedic surgery's recommendations, knee brace is in place  Repeat hemoglobin stable   Doppler negative for deep vein thrombosis     Active Problems:  Hypertension    Mildly elevated     Continue home metoprolol 25 mg BID and lisinopril 5 mg daily     PRN hydralazine p.o. Ordered    Lasix X1 dose with swelling     Generalized anxiety disorder    Continue home PRN lorazepam BID     CAD (coronary artery disease)    History noted, continue metoprolol and lisinopril as above     Continue home aspirin 81 mg daily     Ticagrelor 90 mg BID     Atrial fibrillation (HC)    Aspirin, metoprolol, Ticagrelor     Patient declined coumadin per discussion with Cardiology in 06/18    Hypercholesterolemia    Continue atorvastatin     VTE PROPHYLAXIS:   SCDs: ordered   Meds: None - on aspirin and Ticagrelor as well       ANTICIPATED DISCHARGE DATE: - .  Discharge needs or barriers: per orthopedic surgery , deep vein thrombosis prophylaxis per Orthopedics   Planning on TCU likely today.   Sendy Vargas MD [facility]\"        HPI/ROS:  No CP, SOB, Cough, dizziness, fevers, chills, HA, N/V, dysuria or Bowel Abnormalities. Appetite is GOOD.  No pain except left knee. He wants to go home soon \" was supposed to go home but had drainage from wound on 9/7/18    CODE STATUS/ADVANCE DIRECTIVES DISCUSSION:   CPR/Full code   Patient's living condition: lives with spouse    ALLERGIES:Review of patient's allergies indicates no known allergies.  PAST MEDICAL HISTORY:  has a past medical history of Hypertension.  PAST SURGICAL HISTORY:  has a past surgical history that includes Eye surgery; hernia repair; Esophagoscopy, gastroscopy, duodenoscopy (EGD), combined (N/A, 2/10/2015); Laparoscopic Herniorrhaphy Giant Paraesophageal (N/A, 2/13/2015); Laparoscopic assisted insertion tube gastrotomy (N/A, 2/13/2015); Bronchoscopy flexible (N/A, 2/13/2015); Laparoscopy diagnostic (general) (N/A, 3/23/2017); and CT Coronary " "angiogram (04/03/2017).  FAMILY HISTORY: family history is not on file.  SOCIAL HISTORY:  reports that he quit smoking about 52 years ago. He has never used smokeless tobacco. He reports that he does not drink alcohol or use illicit drugs.    Post Discharge Medication Reconciliation Status: discharge medications reconciled and changed, per note/orders (see AVS).  Current Outpatient Prescriptions   Medication Sig Dispense Refill     Acetaminophen (TYLENOL PO) Take 500 mg by mouth 4 times daily       ASPIRIN EC PO Take 81 mg by mouth daily       atorvastatin (LIPITOR) 40 MG tablet Take 1 tablet (40 mg) by mouth daily 30 tablet 3     lisinopril (PRINIVIL/ZESTRIL) 10 MG tablet Take 10 mg by mouth daily HOLD if SBP <110. Call if held x 2 in a row symptomatic       LORAZEPAM PO Take 0.5 mg by mouth every 4 hours as needed for anxiety       Metoprolol Tartrate (LOPRESSOR PO) Take 25 mg by mouth 2 times daily HOLD if SBP <110 OR HR < 55. Call if held x 2 in a row symptomatic       nitroglycerin (NITROSTAT) 0.4 MG sublingual tablet For chest pain place 1 tablet under the tongue every 5 minutes for 3 doses. If symptoms persist 5 minutes after 1st dose call 911. 25 tablet 0     other medical supplies 2 times daily WINSOME's :  To left leg on am; off hs. edema       OXYCODONE HCL PO Take 5 mg by mouth every 4 hours as needed 1 tab (5mg ) for pain 1-5; 2 tabs (10mg)for pain 6-10.        TICAGRELOR PO Take 90 mg by mouth 2 times daily         ROS:  See above    Exam:  /87  Pulse 90  Temp 97.5  F (36.4  C)  Resp 18  Ht 5' 8\" (1.727 m)  Wt 171 lb 12.8 oz (77.9 kg)  SpO2 97%  BMI 26.12 kg/m2     GENERAL APPEARANCE:  Alert, in no distress, pleasant, cooperative, oriented x 4   EYES:  EOM, lids, pupils and irises normal,sclera clear and conjunctiva normal.  ENT:  Mouth normal, moist mucous membranes, mildly [place]  NECK:  No adenopathy,masses or thyromegaly  RESP:  respiratory effort and palpation of chest normal,  no " respiratory distress, Lung sounds clear, patient is on RA  CV:  Palpation and auscultation of heart done,[id number], no murmur, no rub or gallop. +3 edema left leg, trace on right leg., +dps bilat  ABDOMEN:  + bowel sounds, soft, nontender abdomen.    SKIN:  Inspection of skin and subcutaneous tissue baseline, wound appearance: old right knee scar.  Left knee has Derma bond in place-incision is appreciated and no drainage, has mild redness and warmth, no obvious sing of infection. Large at bruising and edema above and below the knee to mid calf.  M/S:  Digits and nails normal, able to move all extremities but limited left leg  NEURO: cranial nerves 2-12 grossly intact, no facial asymmetry, no speech deficits  PSYCH:  insight and judgement intact, memory intact, affect and mood normal     Last 3 BPs: (9/9) 137 / 87, 134 / 92 ,(9/8) 140 / 90 , 134 / 93 mmHg  HR Ranges:  bpm    Lab/Diagnostic data:  From [facility] labs:    HEMOGLOBIN (09/08/2018 7:59 AM)  Only the most recent of 2 results within the time period is included.    HEMOGLOBIN (09/08/2018 7:59 AM)   Component Value Ref Range Performed At   HEMOGLOBIN  14.2 13.5 - 17.5 g/dL [facility]   MCV  97 80 - 100 fL [facility]       CREATININE (09/08/2018 7:59 AM)  Only the most recent of 2 results within the time period is included.    CREATININE (09/08/2018 7:59 AM)   Component Value Ref Range Performed At   CREATININE 0.81 0.72 - 1.25 mg/dL [facility]   GFR if African American >60 >60 ml/min/1.73m2 [facility]   GFR if not African American >60 >60 ml/min/1.73m2 [facility]         Basic Metabolic Panel (09/06/2018 8:13 AM)  Basic Metabolic Panel (09/06/2018 8:13 AM)   Component Value Ref Range Performed At   SODIUM 139 135 - 145 mmol/L [facility]   POTASSIUM 4.3 3.5 - 5.0 mmol/L [facility]   CHLORIDE 108 98 - 110 mmol/L [facility]    CO2,TOTAL 26 21 - 31 mmol/L [facility]   ANION GAP 5 5 - 18  [facility]   GLUCOSE 90 65 - 100 mg/dL [facility]   CALCIUM 8.8 8.5 - 10.5 mg/dL [facility]   BUN 14 8 - 25 mg/dL [facility]   CREATININE 0.87 0.72 - 1.25 mg/dL [facility]   BUN/CREAT RATIO  16 10 - 20  [facility]   GFR if African American >60 >60 ml/min/1.73m2 [facility]   GFR if not African American >60 >60 ml/min/1.73m2 [facility]       CBC with Platelets no Differential (09/05/2018 10:21 AM)  CBC with Platelets no Differential (09/05/2018 10:21 AM)   Component Value Ref Range Performed At   WHITE BLOOD COUNT  6.1 4.5 - 11.0 thou/cu mm [facility]   RED BLOOD COUNT  4.16 (L) 4.30 - 5.90 mil/cu mm [facility]   HEMOGLOBIN  14.9 13.5 - 17.5 g/dL [facility]   HEMATOCRIT  41.5 37.0 - 53.0 % [facility]   MCV  100 80 - 100 fL [facility]   MCH  35.8 (H) 26.0 - 34.0 pg [facility]   MCHC  35.9 32.0 - 36.0 g/dL [facility]   RDW  12.7 11.5 - 15.5 % [facility]   PLATELET COUNT  153 140 - 440 thou/cu mm [facility]   MPV  9.4 6.5 - 11.0 fL [facility]         Potassium (09/05/2018 10:21 AM)  Potassium (09/05/2018 10:21 AM)   Component Value Ref Range Performed At   POTASSIUM 4.3 3.5 - 5.0 mmol/L [facility]         CBC RESULTS:   Recent Labs   Lab Test 05/22/18 09/12/17   1320   WBC  6.5  5.7   RBC  4.74  4.40   HGB  16.2  15.1   HCT  47.0  43.7   MCV  99.2*  99   MCH  34.2*  34.3*   MCHC  34.5  34.6   RDW  12.4  12.4   PLT  175  149*       Last Basic Metabolic Panel:  Recent Labs   Lab Test 02/27/18 09/12/17   1320   NA  143.0  139   POTASSIUM  4.9  4.2   CHLORIDE  105.0  107   JOANN  8.9  8.7   CO2  29.4  27   BUN  27.0*  22   CR  0.95  0.90   GLC   103.0*  100*       Liver Function Studies -   Recent Labs   Lab Test 02/27/18 05/25/17   PROTTOTAL  6.6  6.9   ALBUMIN  4.1  4.3   BILITOTAL  0.88  1.10*   ALKPHOS  107.0  104.0   AST  26.0  30.0   ALT  38.0  45.0       TSH   Date Value Ref Range Status   04/05/2017 3.24 0.40 - 4.00 mU/L Final       Lab Results   Component Value Date    A1C 5.4 02/14/2015     ASSESSMENT / PLAN:  (M17.12) Primary osteoarthritis of left knee  (primary encounter diagnosis)   (Z96.652) Status post total left knee replacement: 9/5/18   (R53.81) Physical deconditioning  Comment/Plan: Schedule Tylenol, keep low dose prn oxycodone,  WINSOME'S, elevate leg, f/u ortho on 9/19. Ticagrelor for DOAC.    (D62) Anemia due to blood loss, acute  Comment/Plan: check Hgb in am    CARDIAC:  (I10) Essential hypertension   (I48.0) Paroxysmal atrial fibrillation (H)   (I25.119) Coronary artery disease involving native coronary artery of native heart with angina pectoris (H)  Comment/Plan: Ticagrelor(DOAC), BB, Lisinopril, Statin, ASA, [place]     (F41.1) EZEQUIEL (generalized anxiety disorder)  Comment/Plan: ativan prn--home med and decreased from tid prn to bid prn in hospital.  May benefit from a safer med such as an SSRI  for long term management. monitor.      Electronically signed by:  PAT Baxter CNP

## 2018-09-10 NOTE — PROGRESS NOTES
"[facility] DISCHARGE SUMMARY    PATIENT'S NAME: Pablo Fuentes  YOB: 1940    PRIMARY CARE PROVIDER AND CLINIC RESPONSIBLE AFTER TRANSFER: Isaac Nunez     CODE STATUS: Full Code    TRANSFERRING PROVIDERS: Yola Gordon, PAT ORELLANA, Dr. Wilfredo Mackay,      DATE OF SNF ADMISSION:  September / 10 / 2018    DATE OF SNF DISCHARGE (including anticipating DC): September / 14 -15th / 2018    DISCHARGE DISPOSITION: [facility] Provider    Nursing Facility: [facility] stay 9/5/18 to 9/8/18.     Condition on Discharge:  Improving.    Function:  Ambulation  110ft w/ fww, Transfers: sba   Cognitive Scores: TBA   *  Equipment: walker    DISCHARGE DIAGNOSIS:      Primary osteoarthritis of left knee  Status post total left knee replacement  Physical deconditioning  Anemia due to blood loss, acute  Essential hypertension  Paroxysmal atrial fibrillation (H)  Coronary artery disease involving native coronary artery of native heart with angina pectoris (H)  EZEQUIEL (generalized anxiety disorder)        HOSPITAL COURSE:    No Hospital Discharge  available: Procedure preformed as noted below. But pt was to go home on Saturday but when ortho saw pt, he had draining incision though no infection.  It was felt he would need TCU and rehab  LAST PROGRESS NOTE ON 9/8/18   \"Pablo Fuentes is a 78 yo  male with a history of hypertension, hyperlipidemia, anxiety, degenerative joint disease, coronary artery disease, atrial fibrillation, left knee osteoarthritis who was admitted on 9/5/18 for elective left total knee arthroplasty per .     Resumed on his Brilinta, aspirin post surgery is okay per orthopedic surgery. On 9/7/18 having some bleeding noted from incision.    Principal Problem:  Primary osteoarthritis of left knee  S/p left total knee arthroplasty on 9/5/2018 per Dr. Tripp   Post op cares as per Orthopedic surgery   Pain control - as per Ortho " "surgery  Activity, weight bearing as per ortho   PT, OT   Bleeding from incision as per orthopedic surgery's recommendations, knee brace is in place  Repeat hemoglobin stable   Doppler negative for deep vein thrombosis     Active Problems:  Hypertension    Mildly elevated     Continue home metoprolol 25 mg BID and lisinopril 5 mg daily     PRN hydralazine p.o. Ordered    Lasix X1 dose with swelling     Generalized anxiety disorder    Continue home PRN lorazepam BID     CAD (coronary artery disease)    History noted, continue metoprolol and lisinopril as above     Continue home aspirin 81 mg daily     Ticagrelor 90 mg BID     Atrial fibrillation (HC)    Aspirin, metoprolol, Ticagrelor     Patient declined coumadin per discussion with Cardiology in 06/18    Hypercholesterolemia    Continue atorvastatin     VTE PROPHYLAXIS:   Meds: None - on aspirin and Ticagrelor as well       ANTICIPATED DISCHARGE DATE: - .  Discharge needs or barriers: per orthopedic surgery , deep vein thrombosis prophylaxis per Orthopedics   Planning on TCU likely today. Sendy Vargas MD [facility] Service\"         HPI/ROS:  No CP, SOB, Cough, dizziness, fevers, chills, HA, N/V, dysuria or Bowel Abnormalities. Appetite is god.  No pain except controlled with current regimen, says is better than yest. \"When can I go home\"    Last 3 BPs: (9/10) 133/81, (9/9) 137/87, (9/9) 134/92, (9/8) 140/90, (9/8) 134/93 mmHg      PAST MEDICAL HISTORY:  Past Medical History:   Diagnosis Date     Hypertension        DISCHARGE MEDICATIONS:  Current Outpatient Prescriptions   Medication Sig Dispense Refill     Acetaminophen (TYLENOL PO) Take 500 mg by mouth 4 times daily       ASPIRIN EC PO Take 81 mg by mouth daily       atorvastatin (LIPITOR) 40 MG tablet Take 1 tablet (40 mg) by mouth daily 30 tablet 3     lisinopril (PRINIVIL/ZESTRIL) 10 MG tablet Take 10 mg by mouth daily HOLD if SBP <110. Call if held x 2 in a row symptomatic       LORAZEPAM PO " "Take 0.5 mg by mouth every 4 hours as needed for anxiety       Metoprolol Tartrate (LOPRESSOR PO) Take 25 mg by mouth 2 times daily HOLD if SBP <110 OR HR < 55. Call if held x 2 in a row symptomatic       nitroglycerin (NITROSTAT) 0.4 MG sublingual tablet For chest pain place 1 tablet under the tongue every 5 minutes for 3 doses. If symptoms persist 5 minutes after 1st dose call 911. 25 tablet 0     other medical supplies 2 times daily WINSOME's :  To left leg on am; off hs. edema       OXYCODONE HCL PO Take 5 mg by mouth every 4 hours as needed Left knee replacement 9/5/18       senna-docusate (SENOKOT-S;PERICOLACE) 8.6-50 MG per tablet Take 1 tablet by mouth 2 times daily as needed for constipation       TICAGRELOR PO Take 90 mg by mouth 2 times daily         MEDICATION CHANGES/RATIONALE:   See dc med list    TCU/SNF COURSE: pt has progressed but is not ready for discharge. He is very anxious to discharge but agrees to \"stay a few more days\".  Care Conf is tomorrow and therapies think, if he conts to progress, he may be able to go by the wkd(but they will not give a projection yet).      ROS: see above    /81  Pulse 61  Temp 97  F (36.1  C)  Resp 20  Ht 6' (1.829 m)  Wt 171 lb 12.8 oz (77.9 kg)  SpO2 99%  BMI 23.3 kg/m2    PHYSICAL EXAM Today:    GENERAL APPEARANCE:  Alert, in no distress, pleasant, cooperative, oriented x 4    ENT:  Mouth normal with moist mucous membranes, mildly [place]   RESP:  respiratory effort  of chest normal,  no respiratory distress, Lung sounds clear  CV:  Auscultation of heart done,[id number], no murmur, no rub or gallop. +2 edema left leg, trace on right leg., +dps bilat  ABDOMEN:  + bowel sounds, soft, nontender abdomen.    SKIN:  Inspection of skin is at baseline. His  wound appearance on left knee has Derma bond in place-incision is approximated and no drainage, has mild redness and warmth, no sign of infection.The redness, bruising and edema is less than yest.  M/S: Able to " move all extremities and up with walker and SBA, less mobile left leg  NEURO: cranial nerves 2-12 grossly intact, no facial asymmetry, no speech deficits  PSYCH:  insight and judgement intact, memory intact, affect and mood normal     SNF LABS:   CBC RESULTS:   Recent Labs   Lab Test 05/22/18 09/12/17   1320   WBC  6.5  5.7   RBC  4.74  4.40   HGB  16.2  15.1   HCT  47.0  43.7   MCV  99.2*  99   MCH  34.2*  34.3*   MCHC  34.5  34.6   RDW  12.4  12.4   PLT  175  149*       Last Basic Metabolic Panel:  Recent Labs   Lab Test 02/27/18 09/12/17   1320   NA  143.0  139   POTASSIUM  4.9  4.2   CHLORIDE  105.0  107   JOANN  8.9  8.7   CO2  29.4  27   BUN  27.0*  22   CR  0.95  0.90   GLC  103.0*  100*       Liver Function Studies -   Recent Labs   Lab Test 02/27/18 05/25/17   PROTTOTAL  6.6  6.9   ALBUMIN  4.1  4.3   BILITOTAL  0.88  1.10*   ALKPHOS  107.0  104.0   AST  26.0  30.0   ALT  38.0  45.0       TSH   Date Value Ref Range Status   04/05/2017 3.24 0.40 - 4.00 mU/L Final           DISCHARGE PLAN:  Physical Therapy, Registered Nurse and From:  TBA  Follow-up with PCP in 7 days: 7 days.    Current [place] or other scheduled appointments:No future appointments.    MTM referral needed and placed by this provider: none    Pending labs:   Hemoglobin   Date Value Ref Range Status   09/11/2018 11.8 (A) 14.0 - 18.0 g/dL Final   05/22/2018 16.2 14.0 - 17.0 g/dL Final       Discharge Treatments:none except walker and drsg changes daily and prn until sees Ortho on 9/18/18       TOTAL DISCHARGE TIME:   Greater than 30 minutes    PAT Baxter CNP        Documentation of Face-to-Face and Certification for Home Health Services     Patient: Pablo Fuentes   YOB: 1940  MR Number: [id number]  Today's Date: 9/11/2018    I certify that patient: Pablo Fuentes is under my care and that I, or a nurse practitioner or physician's assistant working with me, had a face-to-face encounter that meets the physician  face-to-face encounter requirements with this patient on: 9/11/2018.    This encounter with the patient was in whole, or in part, for the following medical condition, which is the primary reason for home health care:      Primary osteoarthritis of left knee  Status post total left knee replacement  Physical deconditioning  Anemia due to blood loss, acute  Essential hypertension  Paroxysmal atrial fibrillation (H)  Coronary artery disease involving native coronary artery of native heart with angina pectoris (H)  EZEQUIEL (generalized anxiety disorder)  .    I certify that, based on my findings, the following services are medically necessary home health services: Nursing and Physical Therapy.    My clinical findings support the need for the above services because: Nurse is needed: To provide caregiver training to assist with: drsg change, eval of left knee incision. and med and pain review. and Physical Therapy Services are needed to assess and treat the following functional impairments: deconditioning and weakness.    Further, I certify that my clinical findings support that this patient is homebound (i.e. absences from home require considerable and taxing effort and are for medical reasons or [facility] services or infrequently or of short duration when for other reasons) because: Requires assistance of another person or specialized equipment to access medical services because patient: Requires supervision of another for safe transfer...    Based on the above findings. I certify that this patient is confined to the home and needs intermittent skilled nursing care, physical therapy and/or speech therapy.  The patient is under my care, and I have initiated the establishment of the plan of care.  This patient will be followed by a physician who will periodically review the plan of care.  Physician/Provider to provide follow up care: Isaac Nunez    Responsible Medicare certified PECOS Physician: DR Wilfredo Mackay  MD  Physician Signature: See electronic signature associated with these discharge orders.  Date: 9/11/2018

## 2018-09-11 ENCOUNTER — RECORDS - HEALTHEAST (OUTPATIENT)
Dept: LAB | Facility: CLINIC | Age: 78
End: 2018-09-11

## 2018-09-11 ENCOUNTER — TRANSFERRED RECORDS (OUTPATIENT)
Dept: HEALTH INFORMATION MANAGEMENT | Facility: CLINIC | Age: 78
End: 2018-09-11

## 2018-09-11 ENCOUNTER — NURSING HOME VISIT (OUTPATIENT)
Dept: GERIATRICS | Facility: CLINIC | Age: 78
End: 2018-09-11
Payer: MEDICARE

## 2018-09-11 VITALS
BODY MASS INDEX: 23.27 KG/M2 | OXYGEN SATURATION: 99 % | HEART RATE: 61 BPM | RESPIRATION RATE: 20 BRPM | DIASTOLIC BLOOD PRESSURE: 81 MMHG | HEIGHT: 72 IN | TEMPERATURE: 97 F | WEIGHT: 171.8 LBS | SYSTOLIC BLOOD PRESSURE: 133 MMHG

## 2018-09-11 DIAGNOSIS — I10 ESSENTIAL HYPERTENSION: ICD-10-CM

## 2018-09-11 DIAGNOSIS — M17.12 PRIMARY OSTEOARTHRITIS OF LEFT KNEE: Primary | ICD-10-CM

## 2018-09-11 DIAGNOSIS — I25.119 CORONARY ARTERY DISEASE INVOLVING NATIVE CORONARY ARTERY OF NATIVE HEART WITH ANGINA PECTORIS (H): ICD-10-CM

## 2018-09-11 DIAGNOSIS — Z96.652 STATUS POST TOTAL LEFT KNEE REPLACEMENT: ICD-10-CM

## 2018-09-11 DIAGNOSIS — F41.1 GAD (GENERALIZED ANXIETY DISORDER): ICD-10-CM

## 2018-09-11 DIAGNOSIS — I48.0 PAROXYSMAL ATRIAL FIBRILLATION (H): ICD-10-CM

## 2018-09-11 DIAGNOSIS — D62 ANEMIA DUE TO BLOOD LOSS, ACUTE: ICD-10-CM

## 2018-09-11 DIAGNOSIS — R53.81 PHYSICAL DECONDITIONING: ICD-10-CM

## 2018-09-11 LAB
HEMOGLOBIN: 11.8 G/DL (ref 14–18)
HGB BLD-MCNC: 11.8 G/DL (ref 14–18)

## 2018-09-11 PROCEDURE — 99316 NF DSCHRG MGMT 30 MIN+: CPT | Performed by: NURSE PRACTITIONER

## 2018-09-11 RX ORDER — AMOXICILLIN 250 MG
1 CAPSULE ORAL 2 TIMES DAILY PRN
COMMUNITY
Start: 2018-09-11 | End: 2024-01-07

## 2018-09-11 NOTE — PATIENT INSTRUCTIONS
[facility] Discharge Orders    Name: Pablo Fuentes  : 1940  Planned Discharge Date: 9/14-15, 2018 **Tentatively**  Discharged to: previous independent home    MEDICAL FOLLOW UP  Follow up with PCP in 1-2 weeks --see PCP in 1-2 weeks  Follow up with [facility]t       FUTURE LABS: No labs orders/due    ORDER CHANGES:  See below    DISCHARGE MEDICATIONS:  The patient s pharmacy is authorized to dispense a 30-day supply of medications. Refill requests should be directed to the primary provider, Isaac Nunez .   Additional hard copy prescription for oxycodone and (ativan--PTA med)  medication left in facility chart for patient/family to fill at pharmacy of choice after discharge from facility.  Current Outpatient Prescriptions   Medication Sig Dispense Refill     Acetaminophen (TYLENOL PO) Take 500 mg by mouth 4 times daily       ASPIRIN EC PO Take 81 mg by mouth daily       atorvastatin (LIPITOR) 40 MG tablet Take 1 tablet (40 mg) by mouth daily 30 tablet 3     lisinopril (PRINIVIL/ZESTRIL) 10 MG tablet Take 10 mg by mouth daily HOLD if SBP <110. Call if held x 2 in a row symptomatic       LORAZEPAM PO Take 0.5 mg by mouth every 4 hours as needed for anxiety       Metoprolol Tartrate (LOPRESSOR PO) Take 25 mg by mouth 2 times daily HOLD if SBP <110 OR HR < 55. Call if held x 2 in a row symptomatic       nitroglycerin (NITROSTAT) 0.4 MG sublingual tablet For chest pain place 1 tablet under the tongue every 5 minutes for 3 doses. If symptoms persist 5 minutes after 1st dose call 911. 25 tablet 0     other medical supplies 2 times daily WINSOME's :  To left leg on am; off hs. edema       OXYCODONE HCL PO Take 5 mg by mouth every 4 hours as needed Left knee replacement 18       senna-docusate (SENOKOT-S;PERICOLACE) 8.6-50 MG per tablet Take 1 tablet by mouth 2 times daily as needed for constipation       TICAGRELOR PO Take 90 mg by mouth 2 times daily         SERVICES:  Home  Care:  Physical Therapy, Registered Nurse and From:  TBA--not sure yet    ADDITIONAL INSTRUCTIONS:  None    Yola Gordon, PAT ORELLANA  This document was electronically signed on September 11, 2018

## 2018-09-12 ENCOUNTER — TRANSFERRED RECORDS (OUTPATIENT)
Dept: HEALTH INFORMATION MANAGEMENT | Facility: CLINIC | Age: 78
End: 2018-09-12

## 2018-09-12 LAB
ANION GAP SERPL CALCULATED.3IONS-SCNC: 11 MMOL/L (ref 5–18)
ANION GAP SERPL CALCULATED.3IONS-SCNC: 11 MMOL/L (ref 5–18)
BUN SERPL-MCNC: 18 MG/DL (ref 8–28)
BUN SERPL-MCNC: 18 MG/DL (ref 8–28)
CALCIUM SERPL-MCNC: 9 MG/DL (ref 8.5–10.5)
CALCIUM SERPL-MCNC: 9 MG/DL (ref 8.5–10.5)
CHLORIDE BLD-SCNC: 106 MMOL/L (ref 98–107)
CHLORIDE SERPLBLD-SCNC: 106 MMOL/L (ref 98–107)
CO2 SERPL-SCNC: 22 MMOL/L (ref 22–31)
CO2 SERPL-SCNC: 22 MMOL/L (ref 22–31)
CREAT SERPL-MCNC: 0.76 MG/DL (ref 0.7–1.3)
CREAT SERPL-MCNC: 0.76 MG/DL (ref 0.7–1.3)
GFR SERPL CREATININE-BSD FRML MDRD: >60 ML/MIN/1.73M2
GFR SERPL CREATININE-BSD FRML MDRD: >60 ML/MIN/1.73M2
GLUCOSE BLD-MCNC: 102 MG/DL (ref 70–125)
GLUCOSE SERPL-MCNC: 102 MG/DL (ref 70–125)
POTASSIUM BLD-SCNC: 4.2 MMOL/L (ref 3.5–5)
POTASSIUM SERPL-SCNC: 4.2 MMOL/L (ref 3.5–5)
SODIUM SERPL-SCNC: 139 MMOL/L (ref 136–145)
SODIUM SERPL-SCNC: 139 MMOL/L (ref 136–145)

## 2019-08-27 ENCOUNTER — TRANSFERRED RECORDS (OUTPATIENT)
Dept: HEALTH INFORMATION MANAGEMENT | Facility: CLINIC | Age: 79
End: 2019-08-27

## 2019-08-27 LAB
ALBUMIN SERPL-MCNC: 4 G/DL (ref 3.4–5)
ALP SERPL-CCNC: 104 U/L (ref 0–116)
ALT SERPL-CCNC: 30 U/L (ref 0–78)
ANION GAP SERPL CALCULATED.3IONS-SCNC: 14.3 MMOL/L (ref 9–19)
AST SERPL-CCNC: 23 U/L (ref 0–37)
BILIRUB SERPL-MCNC: 1.03 MG/DL (ref 0.2–1)
BILIRUBIN DIRECT: 0.29 MG/DL (ref 0–0.2)
BILIRUBIN,INDIRECT: 0.74 MG/DL
BUN SERPL-MCNC: 28 MG/DL (ref 7–18)
CALCIUM SERPL-MCNC: 9 MG/DL (ref 8.5–10.1)
CHLORIDE SERPLBLD-SCNC: 105 MMOL/L (ref 98–107)
CHOLEST SERPL-MCNC: 108 MG/DL (ref 0–200)
CO2 SERPL-SCNC: 26.5 MMOL/L (ref 21–32)
CREAT SERPL-MCNC: 1.06 MG/DL (ref 0.6–1.3)
GLUCOSE SERPL-MCNC: 106 MG/DL (ref 70–99)
HDLC SERPL-MCNC: 56 MG/DL (ref 40–96)
LDLC SERPL CALC-MCNC: 35.2 MG/DL (ref 0–130)
POTASSIUM SERPL-SCNC: 4.8 MMOL/L (ref 3.5–5.1)
PROT SERPL-MCNC: 6.6 G/DL (ref 6.4–8.2)
SODIUM SERPL-SCNC: 141 MMOL/L (ref 136–145)
TRIGL SERPL-MCNC: 84 MG/DL (ref 30–200)
VLDL - CALC: 16.8 CALC (ref 5–40)

## 2019-10-10 ENCOUNTER — OFFICE VISIT (OUTPATIENT)
Dept: CARDIOLOGY | Facility: CLINIC | Age: 79
End: 2019-10-10
Payer: MEDICARE

## 2019-10-10 VITALS
DIASTOLIC BLOOD PRESSURE: 86 MMHG | SYSTOLIC BLOOD PRESSURE: 138 MMHG | BODY MASS INDEX: 25.77 KG/M2 | WEIGHT: 180 LBS | HEIGHT: 70 IN | HEART RATE: 84 BPM

## 2019-10-10 DIAGNOSIS — I48.0 PAROXYSMAL ATRIAL FIBRILLATION (H): ICD-10-CM

## 2019-10-10 DIAGNOSIS — I25.10 CORONARY ARTERY DISEASE INVOLVING NATIVE CORONARY ARTERY OF NATIVE HEART WITHOUT ANGINA PECTORIS: Primary | ICD-10-CM

## 2019-10-10 PROCEDURE — 99214 OFFICE O/P EST MOD 30 MIN: CPT | Performed by: INTERNAL MEDICINE

## 2019-10-10 RX ORDER — CLOPIDOGREL BISULFATE 75 MG/1
75 TABLET ORAL DAILY
Qty: 30 TABLET | Refills: 11 | Status: SHIPPED | OUTPATIENT
Start: 2019-10-10 | End: 2020-09-21

## 2019-10-10 ASSESSMENT — MIFFLIN-ST. JEOR: SCORE: 1532.72

## 2019-10-10 NOTE — LETTER
10/10/2019      Isaac Nunez DO  [facility] [address]  [facility] [address]      RE: Pablo Fuentes       Dear Colleague,    I had the pleasure of seeing Pablo Fuentes in the [facility].    Service Date: 10/10/2019      HISTORY OF PRESENT ILLNESS:  Pablo Fuentes, an 78-year-old man with coronary artery disease, paroxysmal atrial fibrillation, hypertension, and osteoarthritis status post bilateral knee replacement was seen today at your request for followup.      Since last seen, Mr. Fuentes underwent successful bilateral total knee replacement.  He is ambulating with assistance of a cane and his knee pain has been much better controlled.  He has been satisfied with the surgical outcome.      The patient remains on aspirin and ticagrelor.  As you recall, he has documented paroxysmal atrial fibrillation, but consistently has refused anticoagulation.      He remains free of chest, arm, neck, jaw or back discomfort with exertion, dyspnea, palpitation, syncope or lightheadedness.      PAST MEDICAL HISTORY:   1.  Coronary artery disease.   a.  Non-ST segment elevation MI 04/02/2017.  Troponin level 13.  Echo showing ejection fraction of 60% with basal lateral hypokinesis.  Angiography showing 30% ostial left main with no catheter dampening and good catheter reflux.  LAD with midvessel 40% narrowing.  70% narrowing involving the origin of the very small second diagonal branch.  Subtotal narrowing in the proximal circumflex.  60% calcified narrowing in proximal right coronary with normal iFR.  Status post implantation of 24x2.5 mm length everolimus-eluting stent with excellent angiographic results.   2.  Paroxysmal atrial fibrillation - has refused anticoagulation.  Currently asymptomatic.   3.  Dyslipidemia.   4.  Hypertension.   5.  Osteoarthritis - status post bilateral total knee replacement.      PHYSICAL EXAMINATION:   GENERAL:  Exam today demonstrates  a pleasant and cooperative 78-year-old man.   VITAL SIGNS:  His blood pressure was 138/86, heart rate 84 and regular.  His height is 1.8 meters, his weight is 81.6 kg.  His BMI is 25.   LUNGS:  Clear to percussion and auscultation.   CARDIOVASCULAR:  Shows a normal S1 with a normal S2.  There is no S3.  There is no murmur, rub or click.      LABORATORY STUDIES:  His most recent LDL cholesterol in 2019 was 35.  His most recent creatinine was 1.06 with a GFR greater than 60.      ASSESSMENT:  Mr. Zelaya is asymptomatic with optimal LDL cholesterol and acceptable systolic blood pressure on guideline-directed medical therapy for vascular disease.  I again reviewed the benefits of anticoagulation for the prevention of stroke.  Despite my explanations, the patient is totally unwilling to consider an anticoagulant.  He is willing to consider long-term dual antiplatelet therapy for his documented vascular disease.  As he has not had any major bleeding complications, continued therapy with aspirin and a PY2 receptor inhibitor seems reasonable.  We will plan to switch the patient from ticagrelor to clopidogrel to simplify his medical regimen.      RECOMMENDATIONS:   1.  Despite my recommendations, the patient has declined anticoagulation.   2.  Continue aspirin and clopidogrel.  We will discontinue ticagrelor.   3.  Followup visit in about 1 year.      We have appreciated the opportunity to see your patient, Luh Zelaya.      cc:   Isaac Nunez, [address]  [address]         ROMY ESTEVES MD             D: 10/10/2019   T: 10/10/2019   MT: GARY      Name:     LUH ZELAYA   MRN:      [id number]        Account:      [id number]   :      1940           Service Date: 10/10/2019      Document: [id number]           Outpatient Encounter Medications as of 10/10/2019   Medication Sig Dispense Refill     Acetaminophen (TYLENOL PO) Take 500 mg by  mouth 4 times daily       ASPIRIN EC PO Take 81 mg by mouth daily       atorvastatin (LIPITOR) 40 MG tablet Take 1 tablet (40 mg) by mouth daily 30 tablet 3     clopidogrel (PLAVIX) 75 MG tablet Take 1 tablet (75 mg) by mouth daily 30 tablet 11     lisinopril (PRINIVIL/ZESTRIL) 10 MG tablet Take 5 mg by mouth daily        Metoprolol Tartrate (LOPRESSOR PO) Take 25 mg by mouth 2 times daily HOLD if SBP <110 OR HR < 55. Call if held x 2 in a row symptomatic       nitroglycerin (NITROSTAT) 0.4 MG sublingual tablet For chest pain place 1 tablet under the tongue every 5 minutes for 3 doses. If symptoms persist 5 minutes after 1st dose call 911. 25 tablet 0     other medical supplies 2 times daily WINSOME's :  To left leg on am; off hs. edema       LORAZEPAM PO Take 0.5 mg by mouth every 4 hours as needed for anxiety       OXYCODONE HCL PO Take 5 mg by mouth every 4 hours as needed Left knee replacement 9/5/18       senna-docusate (SENOKOT-S;PERICOLACE) 8.6-50 MG per tablet Take 1 tablet by mouth 2 times daily as needed for constipation       [DISCONTINUED] TICAGRELOR PO Take 90 mg by mouth 2 times daily       No facility-administered encounter medications on file as of 10/10/2019.        Again, thank you for allowing me to participate in the care of your patient.      Sincerely,    Frank Nelson MD     [facility]

## 2019-10-10 NOTE — PROGRESS NOTES
Service Date: 10/10/2019      HISTORY OF PRESENT ILLNESS:  Pablo Fuentes, an 78-year-old man with coronary artery disease, paroxysmal atrial fibrillation, hypertension, and osteoarthritis status post bilateral knee replacement was seen today at your request for followup.      Since last seen, Mr. Fuentes underwent successful bilateral total knee replacement.  He is ambulating with assistance of a cane and his knee pain has been much better controlled.  He has been satisfied with the surgical outcome.      The patient remains on aspirin and ticagrelor.  As you recall, he has documented paroxysmal atrial fibrillation, but consistently has refused anticoagulation.      He remains free of chest, arm, neck, jaw or back discomfort with exertion, dyspnea, palpitation, syncope or lightheadedness.      PAST MEDICAL HISTORY:   1.  Coronary artery disease.   a.  Non-ST segment elevation MI 04/02/2017.  Troponin level 13.  Echo showing ejection fraction of 60% with basal lateral hypokinesis.  Angiography showing 30% ostial left main with no catheter dampening and good catheter reflux.  LAD with midvessel 40% narrowing.  70% narrowing involving the origin of the very small second diagonal branch.  Subtotal narrowing in the proximal circumflex.  60% calcified narrowing in proximal right coronary with normal iFR.  Status post implantation of 24x2.5 mm length everolimus-eluting stent with excellent angiographic results.   2.  Paroxysmal atrial fibrillation - has refused anticoagulation.  Currently asymptomatic.   3.  Dyslipidemia.   4.  Hypertension.   5.  Osteoarthritis - status post bilateral total knee replacement.      PHYSICAL EXAMINATION:   GENERAL:  Exam today demonstrates a pleasant and cooperative 78-year-old man.   VITAL SIGNS:  His blood pressure was 138/86, heart rate 84 and regular.  His height is 1.8 meters, his weight is 81.6 kg.  His BMI is 25.   LUNGS:  Clear to percussion and auscultation.   CARDIOVASCULAR:  Shows  a normal S1 with a normal S2.  There is no S3.  There is no murmur, rub or click.      LABORATORY STUDIES:  His most recent LDL cholesterol in 2019 was 35.  His most recent creatinine was 1.06 with a GFR greater than 60.      ASSESSMENT:  Mr. Zelaya is asymptomatic with optimal LDL cholesterol and acceptable systolic blood pressure on guideline-directed medical therapy for vascular disease.  I again reviewed the benefits of anticoagulation for the prevention of stroke.  Despite my explanations, the patient is totally unwilling to consider an anticoagulant.  He is willing to consider long-term dual antiplatelet therapy for his documented vascular disease.  As he has not had any major bleeding complications, continued therapy with aspirin and a PY2 receptor inhibitor seems reasonable.  We will plan to switch the patient from ticagrelor to clopidogrel to simplify his medical regimen.      RECOMMENDATIONS:   1.  Despite my recommendations, the patient has declined anticoagulation.   2.  Continue aspirin and clopidogrel.  We will discontinue ticagrelor.   3.  Followup visit in about 1 year.      We have appreciated the opportunity to see your patient, Luh Zelaya.      cc:   Isaac Nunez,    [address]  [id number]         ROMY ESTEVES MD             D: 10/10/2019   T: 10/10/2019   MT: GARY      Name:     LUH ZELAYA   MRN:      [id number]        Account:      [id number]   :      1940           Service Date: 10/10/2019      Document: [id number]

## 2019-10-10 NOTE — PROGRESS NOTES
"HISTORY OF PRESENT ILLNESS:  Has undergone bilateral TKR. Using cane for long walks.No chest pain. Still does not wish to take anticoagulation for PAF. He is willing to continue DAPT, would like more convenient regimen.     Orders this Visit:  No orders of the defined types were placed in this encounter.    No orders of the defined types were placed in this encounter.    There are no discontinued medications.    No diagnosis found.    CURRENT MEDICATIONS:  Current Outpatient Medications   Medication Sig Dispense Refill     Acetaminophen (TYLENOL PO) Take 500 mg by mouth 4 times daily       ASPIRIN EC PO Take 81 mg by mouth daily       atorvastatin (LIPITOR) 40 MG tablet Take 1 tablet (40 mg) by mouth daily 30 tablet 3     lisinopril (PRINIVIL/ZESTRIL) 10 MG tablet Take 5 mg by mouth daily        Metoprolol Tartrate (LOPRESSOR PO) Take 25 mg by mouth 2 times daily HOLD if SBP <110 OR HR < 55. Call if held x 2 in a row symptomatic       nitroglycerin (NITROSTAT) 0.4 MG sublingual tablet For chest pain place 1 tablet under the tongue every 5 minutes for 3 doses. If symptoms persist 5 minutes after 1st dose call 911. 25 tablet 0     other medical supplies 2 times daily [employer] :  To left leg on am; off hs. edema       TICAGRELOR PO Take 90 mg by mouth 2 times daily       LORAZEPAM PO Take 0.5 mg by mouth every 4 hours as needed for anxiety       OXYCODONE HCL PO Take 5 mg by mouth every 4 hours as needed Left knee replacement 9/5/18       senna-docusate (SENOKOT-S;PERICOLACE) 8.6-50 MG per tablet Take 1 tablet by mouth 2 times daily as needed for constipation         ALLERGIES   No Known Allergies    PAST MEDICAL, SURGICAL, FAMILY, SOCIAL HISTORY:  History was reviewed and updated as needed, see medical record.    Review of Systems:  A 12-point review of systems was completed, see medical record for detailed review of systems information.    Physical Exam:  Vitals: /86   Pulse 84   Ht 1.778 m (5' 10\")   Wt 81.6 " kg (180 lb)   BMI 25.83 kg/m      Constitutional:           Skin:           Head:           Eyes:           ENT:           Neck:           Chest:           Cardiac:                    Abdomen:           Vascular:                                        Extremities and Back:           Neurological:           ASSESSMENT: Stable. I would advise anticoagulation due to CHADSVaSc 4, but he is not willing.        RECOMMENDATIONS:   Asa 81 clopidogrel 75 daily  Follow-up in one year    Recent Lab Results:  LIPID RESULTS:  Lab Results   Component Value Date    CHOL 108.0 08/27/2019    HDL 56.0 08/27/2019    LDL 35.2 08/27/2019    TRIG 84.0 08/27/2019    CHOLHDLRATIO 1.9 02/27/2018       LIVER ENZYME RESULTS:  Lab Results   Component Value Date    AST 23.0 08/27/2019    ALT 30.0 08/27/2019       CBC RESULTS:  Lab Results   Component Value Date    WBC 6.5 05/22/2018    RBC 4.74 05/22/2018    HGB 11.8 (A) 09/11/2018    HCT 47.0 05/22/2018    MCV 99.2 (H) 05/22/2018    MCH 34.2 (H) 05/22/2018    MCHC 34.5 05/22/2018    RDW 12.4 05/22/2018     05/22/2018       BMP RESULTS:  Lab Results   Component Value Date    .0 08/27/2019    POTASSIUM 4.8 08/27/2019    CHLORIDE 105.0 08/27/2019    CO2 26.5 08/27/2019    ANIONGAP 14.3 08/27/2019     (H) 08/27/2019    BUN 28.0 (H) 08/27/2019    CR 1.06 08/27/2019    GFRESTIMATED >60 09/12/2018    GFRESTBLACK >60 09/12/2018    JOANN 9.0 08/27/2019        A1C RESULTS:  Lab Results   Component Value Date    A1C 5.4 02/14/2015       INR RESULTS:  Lab Results   Component Value Date    INR 0.95 09/12/2017    INR 1.03 02/13/2015       We greatly appreciate the opportunity to be involved in the care of your patient, Pablo Fuentes.    Sincerely,  Frank Nelson MD      CC  No referring provider defined for this encounter.

## 2019-10-10 NOTE — LETTER
10/10/2019    Isaac Nunez DO  [facility] [id number]  [facility] [address]    RE: Pablo Fuentes       Dear Colleague,    I had the pleasure of seeing Pablo Fuentes in the [facility].    HISTORY OF PRESENT ILLNESS:  Has undergone bilateral TKR. Using cane for long walks.No chest pain. Still does not wish to take anticoagulation for PAF. He is willing to continue DAPT, would like more convenient regimen.     Orders this Visit:  No orders of the defined types were placed in this encounter.    No orders of the defined types were placed in this encounter.    There are no discontinued medications.    No diagnosis found.    CURRENT MEDICATIONS:  Current Outpatient Medications   Medication Sig Dispense Refill     Acetaminophen (TYLENOL PO) Take 500 mg by mouth 4 times daily       ASPIRIN EC PO Take 81 mg by mouth daily       atorvastatin (LIPITOR) 40 MG tablet Take 1 tablet (40 mg) by mouth daily 30 tablet 3     lisinopril (PRINIVIL/ZESTRIL) 10 MG tablet Take 5 mg by mouth daily        Metoprolol Tartrate (LOPRESSOR PO) Take 25 mg by mouth 2 times daily HOLD if SBP <110 OR HR < 55. Call if held x 2 in a row symptomatic       nitroglycerin (NITROSTAT) 0.4 MG sublingual tablet For chest pain place 1 tablet under the tongue every 5 minutes for 3 doses. If symptoms persist 5 minutes after 1st dose call 911. 25 tablet 0     other medical supplies 2 times daily [employer] :  To left leg on am; off hs. edema       TICAGRELOR PO Take 90 mg by mouth 2 times daily       LORAZEPAM PO Take 0.5 mg by mouth every 4 hours as needed for anxiety       OXYCODONE HCL PO Take 5 mg by mouth every 4 hours as needed Left knee replacement 9/5/18       senna-docusate (SENOKOT-S;PERICOLACE) 8.6-50 MG per tablet Take 1 tablet by mouth 2 times daily as needed for constipation         ALLERGIES   No Known Allergies    PAST MEDICAL, SURGICAL, FAMILY, SOCIAL HISTORY:  History was reviewed and  "updated as needed, see medical record.    Review of Systems:  A 12-point review of systems was completed, see medical record for detailed review of systems information.    Physical Exam:  Vitals: /86   Pulse 84   Ht 1.778 m (5' 10\")   Wt 81.6 kg (180 lb)   BMI 25.83 kg/m       Constitutional:           Skin:           Head:           Eyes:           ENT:           Neck:           Chest:           Cardiac:                    Abdomen:           Vascular:                                        Extremities and Back:           Neurological:           ASSESSMENT: Stable. I would advise anticoagulation due to CHADSVaSc 4, but he is not willing.        RECOMMENDATIONS:   Asa 81 clopidogrel 75 daily  Follow-up in one year    Recent Lab Results:  LIPID RESULTS:  Lab Results   Component Value Date    CHOL 108.0 08/27/2019    HDL 56.0 08/27/2019    LDL 35.2 08/27/2019    TRIG 84.0 08/27/2019    CHOLHDLRATIO 1.9 02/27/2018       LIVER ENZYME RESULTS:  Lab Results   Component Value Date    AST 23.0 08/27/2019    ALT 30.0 08/27/2019       CBC RESULTS:  Lab Results   Component Value Date    WBC 6.5 05/22/2018    RBC 4.74 05/22/2018    HGB 11.8 (A) 09/11/2018    HCT 47.0 05/22/2018    MCV 99.2 (H) 05/22/2018    MCH 34.2 (H) 05/22/2018    MCHC 34.5 05/22/2018    RDW 12.4 05/22/2018     05/22/2018       BMP RESULTS:  Lab Results   Component Value Date    .0 08/27/2019    POTASSIUM 4.8 08/27/2019    CHLORIDE 105.0 08/27/2019    CO2 26.5 08/27/2019    ANIONGAP 14.3 08/27/2019     (H) 08/27/2019    BUN 28.0 (H) 08/27/2019    CR 1.06 08/27/2019    GFRESTIMATED >60 09/12/2018    GFRESTBLACK >60 09/12/2018    JOANN 9.0 08/27/2019        A1C RESULTS:  Lab Results   Component Value Date    A1C 5.4 02/14/2015       INR RESULTS:  Lab Results   Component Value Date    INR 0.95 09/12/2017    INR 1.03 02/13/2015       We greatly appreciate the opportunity to be involved in the care of your patient, Pablo MARRERO" Gina.    Sincerely,  Frank Nelson MD      CC  No referring provider defined for this encounter.                                                                       Thank you for allowing me to participate in the care of your patient.      Sincerely,     Frank Nelson MD     [facility]    cc:   No referring provider defined for this encounter.

## 2020-09-21 DIAGNOSIS — I25.10 CORONARY ARTERY DISEASE INVOLVING NATIVE CORONARY ARTERY OF NATIVE HEART WITHOUT ANGINA PECTORIS: ICD-10-CM

## 2020-09-21 DIAGNOSIS — I48.0 PAROXYSMAL ATRIAL FIBRILLATION (H): ICD-10-CM

## 2020-09-21 RX ORDER — CLOPIDOGREL BISULFATE 75 MG/1
75 TABLET ORAL DAILY
Qty: 90 TABLET | Refills: 0 | Status: SHIPPED | OUTPATIENT
Start: 2020-09-21 | End: 2024-01-07

## 2020-09-21 NOTE — TELEPHONE ENCOUNTER
Received refill request for:  Plavix  Last OV was: 10/10/19  Labs/EKG: na  F/U scheduled: Orders for 10/2020 - nothing scheduled   New script sent to: [facility] w/pt reminder to schedule annual follow-up     Modesto Bonilla LPN  [facility]  [phone]

## 2024-01-07 ENCOUNTER — APPOINTMENT (OUTPATIENT)
Dept: GENERAL RADIOLOGY | Facility: CLINIC | Age: 84
DRG: 389 | End: 2024-01-07
Attending: EMERGENCY MEDICINE
Payer: MEDICARE

## 2024-01-07 ENCOUNTER — APPOINTMENT (OUTPATIENT)
Dept: CT IMAGING | Facility: CLINIC | Age: 84
DRG: 389 | End: 2024-01-07
Attending: EMERGENCY MEDICINE
Payer: MEDICARE

## 2024-01-07 ENCOUNTER — HOSPITAL ENCOUNTER (INPATIENT)
Facility: CLINIC | Age: 84
LOS: 2 days | Discharge: HOME | DRG: 389 | End: 2024-01-09
Attending: EMERGENCY MEDICINE | Admitting: INTERNAL MEDICINE
Payer: MEDICARE

## 2024-01-07 DIAGNOSIS — K56.609 SBO (SMALL BOWEL OBSTRUCTION) (H): ICD-10-CM

## 2024-01-07 DIAGNOSIS — N39.0 ACUTE UTI: Primary | ICD-10-CM

## 2024-01-07 LAB
ALBUMIN SERPL BCG-MCNC: 3.9 G/DL (ref 3.5–5.2)
ALBUMIN UR-MCNC: 10 MG/DL
ALP SERPL-CCNC: 88 U/L (ref 40–150)
ALT SERPL W P-5'-P-CCNC: 33 U/L (ref 0–70)
ANION GAP SERPL CALCULATED.3IONS-SCNC: 11 MMOL/L (ref 7–15)
ANION GAP SERPL CALCULATED.3IONS-SCNC: 13 MMOL/L (ref 7–15)
APPEARANCE UR: CLEAR
AST SERPL W P-5'-P-CCNC: 38 U/L (ref 0–45)
BASOPHILS # BLD AUTO: 0 10E3/UL (ref 0–0.2)
BASOPHILS NFR BLD AUTO: 0 %
BILIRUB SERPL-MCNC: 0.7 MG/DL
BILIRUB UR QL STRIP: NEGATIVE
BUN SERPL-MCNC: 26.8 MG/DL (ref 8–23)
BUN SERPL-MCNC: 27.2 MG/DL (ref 8–23)
CALCIUM SERPL-MCNC: 8 MG/DL (ref 8.8–10.2)
CALCIUM SERPL-MCNC: 8.6 MG/DL (ref 8.8–10.2)
CHLORIDE SERPL-SCNC: 101 MMOL/L (ref 98–107)
CHLORIDE SERPL-SCNC: 102 MMOL/L (ref 98–107)
COLOR UR AUTO: YELLOW
CREAT SERPL-MCNC: 1.18 MG/DL (ref 0.67–1.17)
CREAT SERPL-MCNC: 1.26 MG/DL (ref 0.67–1.17)
DEPRECATED HCO3 PLAS-SCNC: 22 MMOL/L (ref 22–29)
DEPRECATED HCO3 PLAS-SCNC: 23 MMOL/L (ref 22–29)
EGFRCR SERPLBLD CKD-EPI 2021: 56 ML/MIN/1.73M2
EGFRCR SERPLBLD CKD-EPI 2021: 60 ML/MIN/1.73M2
EOSINOPHIL # BLD AUTO: 0 10E3/UL (ref 0–0.7)
EOSINOPHIL NFR BLD AUTO: 0 %
ERYTHROCYTE [DISTWIDTH] IN BLOOD BY AUTOMATED COUNT: 11.9 % (ref 10–15)
GLUCOSE SERPL-MCNC: 139 MG/DL (ref 70–99)
GLUCOSE SERPL-MCNC: 177 MG/DL (ref 70–99)
GLUCOSE UR STRIP-MCNC: NEGATIVE MG/DL
HCT VFR BLD AUTO: 38.3 % (ref 40–53)
HGB BLD-MCNC: 13.5 G/DL (ref 13.3–17.7)
HGB UR QL STRIP: NEGATIVE
HYALINE CASTS: 16 /LPF
IMM GRANULOCYTES # BLD: 0.1 10E3/UL
IMM GRANULOCYTES NFR BLD: 1 %
KETONES UR STRIP-MCNC: NEGATIVE MG/DL
LACTATE SERPL-SCNC: 1.6 MMOL/L (ref 0.7–2)
LEUKOCYTE ESTERASE UR QL STRIP: ABNORMAL
LIPASE SERPL-CCNC: 14 U/L (ref 13–60)
LYMPHOCYTES # BLD AUTO: 1.6 10E3/UL (ref 0.8–5.3)
LYMPHOCYTES NFR BLD AUTO: 13 %
MCH RBC QN AUTO: 37.1 PG (ref 26.5–33)
MCHC RBC AUTO-ENTMCNC: 35.2 G/DL (ref 31.5–36.5)
MCV RBC AUTO: 105 FL (ref 78–100)
MONOCYTES # BLD AUTO: 0.7 10E3/UL (ref 0–1.3)
MONOCYTES NFR BLD AUTO: 5 %
MUCOUS THREADS #/AREA URNS LPF: PRESENT /LPF
NEUTROPHILS # BLD AUTO: 10.3 10E3/UL (ref 1.6–8.3)
NEUTROPHILS NFR BLD AUTO: 81 %
NITRATE UR QL: NEGATIVE
NRBC # BLD AUTO: 0 10E3/UL
NRBC BLD AUTO-RTO: 0 /100
PH UR STRIP: 5 [PH] (ref 5–7)
PLATELET # BLD AUTO: 156 10E3/UL (ref 150–450)
POTASSIUM SERPL-SCNC: 4 MMOL/L (ref 3.4–5.3)
POTASSIUM SERPL-SCNC: 4 MMOL/L (ref 3.4–5.3)
PROT SERPL-MCNC: 6.3 G/DL (ref 6.4–8.3)
RBC # BLD AUTO: 3.64 10E6/UL (ref 4.4–5.9)
RBC URINE: 0 /HPF
SODIUM SERPL-SCNC: 136 MMOL/L (ref 135–145)
SODIUM SERPL-SCNC: 136 MMOL/L (ref 135–145)
SP GR UR STRIP: 1.01 (ref 1–1.03)
SQUAMOUS EPITHELIAL: 1 /HPF
TRANSITIONAL EPI: 1 /HPF
UROBILINOGEN UR STRIP-MCNC: NORMAL MG/DL
WBC # BLD AUTO: 12.8 10E3/UL (ref 4–11)
WBC URINE: 104 /HPF

## 2024-01-07 PROCEDURE — 85025 COMPLETE CBC W/AUTO DIFF WBC: CPT | Performed by: EMERGENCY MEDICINE

## 2024-01-07 PROCEDURE — 96374 THER/PROPH/DIAG INJ IV PUSH: CPT | Mod: 59

## 2024-01-07 PROCEDURE — 99223 1ST HOSP IP/OBS HIGH 75: CPT | Mod: AI | Performed by: INTERNAL MEDICINE

## 2024-01-07 PROCEDURE — 250N000011 HC RX IP 250 OP 636: Performed by: EMERGENCY MEDICINE

## 2024-01-07 PROCEDURE — 83690 ASSAY OF LIPASE: CPT | Performed by: EMERGENCY MEDICINE

## 2024-01-07 PROCEDURE — 99285 EMERGENCY DEPT VISIT HI MDM: CPT | Mod: 25

## 2024-01-07 PROCEDURE — 36415 COLL VENOUS BLD VENIPUNCTURE: CPT | Performed by: EMERGENCY MEDICINE

## 2024-01-07 PROCEDURE — 83605 ASSAY OF LACTIC ACID: CPT | Performed by: EMERGENCY MEDICINE

## 2024-01-07 PROCEDURE — 250N000011 HC RX IP 250 OP 636: Performed by: INTERNAL MEDICINE

## 2024-01-07 PROCEDURE — 96361 HYDRATE IV INFUSION ADD-ON: CPT

## 2024-01-07 PROCEDURE — 36415 COLL VENOUS BLD VENIPUNCTURE: CPT | Performed by: INTERNAL MEDICINE

## 2024-01-07 PROCEDURE — G1010 CDSM STANSON: HCPCS

## 2024-01-07 PROCEDURE — 258N000003 HC RX IP 258 OP 636: Performed by: INTERNAL MEDICINE

## 2024-01-07 PROCEDURE — 96375 TX/PRO/DX INJ NEW DRUG ADDON: CPT

## 2024-01-07 PROCEDURE — 120N000001 HC R&B MED SURG/OB

## 2024-01-07 PROCEDURE — 87086 URINE CULTURE/COLONY COUNT: CPT | Performed by: INTERNAL MEDICINE

## 2024-01-07 PROCEDURE — 81001 URINALYSIS AUTO W/SCOPE: CPT | Performed by: EMERGENCY MEDICINE

## 2024-01-07 PROCEDURE — 258N000003 HC RX IP 258 OP 636: Performed by: EMERGENCY MEDICINE

## 2024-01-07 PROCEDURE — 999N000065 XR ABDOMEN PORT 1 VIEW

## 2024-01-07 PROCEDURE — 74177 CT ABD & PELVIS W/CONTRAST: CPT | Mod: MG

## 2024-01-07 PROCEDURE — 250N000013 HC RX MED GY IP 250 OP 250 PS 637: Performed by: INTERNAL MEDICINE

## 2024-01-07 PROCEDURE — 250N000009 HC RX 250: Performed by: EMERGENCY MEDICINE

## 2024-01-07 PROCEDURE — 80053 COMPREHEN METABOLIC PANEL: CPT | Performed by: EMERGENCY MEDICINE

## 2024-01-07 RX ORDER — SODIUM CHLORIDE 9 MG/ML
INJECTION, SOLUTION INTRAVENOUS CONTINUOUS
Status: DISCONTINUED | OUTPATIENT
Start: 2024-01-07 | End: 2024-01-09 | Stop reason: HOSPADM

## 2024-01-07 RX ORDER — HYDROMORPHONE HCL IN WATER/PF 6 MG/30 ML
0.2 PATIENT CONTROLLED ANALGESIA SYRINGE INTRAVENOUS
Status: DISCONTINUED | OUTPATIENT
Start: 2024-01-07 | End: 2024-01-09 | Stop reason: HOSPADM

## 2024-01-07 RX ORDER — HYDROCHLOROTHIAZIDE 25 MG/1
25 TABLET ORAL DAILY
COMMUNITY

## 2024-01-07 RX ORDER — NALOXONE HYDROCHLORIDE 0.4 MG/ML
0.4 INJECTION, SOLUTION INTRAMUSCULAR; INTRAVENOUS; SUBCUTANEOUS
Status: DISCONTINUED | OUTPATIENT
Start: 2024-01-07 | End: 2024-01-09 | Stop reason: HOSPADM

## 2024-01-07 RX ORDER — ONDANSETRON 2 MG/ML
4 INJECTION INTRAMUSCULAR; INTRAVENOUS EVERY 6 HOURS PRN
Status: DISCONTINUED | OUTPATIENT
Start: 2024-01-07 | End: 2024-01-09 | Stop reason: HOSPADM

## 2024-01-07 RX ORDER — CEFTRIAXONE 2 G/1
2 INJECTION, POWDER, FOR SOLUTION INTRAMUSCULAR; INTRAVENOUS EVERY 24 HOURS
Status: DISCONTINUED | OUTPATIENT
Start: 2024-01-07 | End: 2024-01-09 | Stop reason: HOSPADM

## 2024-01-07 RX ORDER — NALOXONE HYDROCHLORIDE 0.4 MG/ML
0.2 INJECTION, SOLUTION INTRAMUSCULAR; INTRAVENOUS; SUBCUTANEOUS
Status: DISCONTINUED | OUTPATIENT
Start: 2024-01-07 | End: 2024-01-09 | Stop reason: HOSPADM

## 2024-01-07 RX ORDER — CLOPIDOGREL BISULFATE 75 MG/1
75 TABLET ORAL DAILY
Status: DISCONTINUED | OUTPATIENT
Start: 2024-01-07 | End: 2024-01-07

## 2024-01-07 RX ORDER — METOCLOPRAMIDE HYDROCHLORIDE 5 MG/ML
5 INJECTION INTRAMUSCULAR; INTRAVENOUS EVERY 6 HOURS PRN
Status: DISCONTINUED | OUTPATIENT
Start: 2024-01-07 | End: 2024-01-09 | Stop reason: HOSPADM

## 2024-01-07 RX ORDER — ASPIRIN 81 MG/1
81 TABLET ORAL DAILY
Status: DISCONTINUED | OUTPATIENT
Start: 2024-01-07 | End: 2024-01-07

## 2024-01-07 RX ORDER — LISINOPRIL 20 MG/1
20 TABLET ORAL DAILY
COMMUNITY

## 2024-01-07 RX ORDER — ONDANSETRON 4 MG/1
4 TABLET, ORALLY DISINTEGRATING ORAL EVERY 6 HOURS PRN
Status: DISCONTINUED | OUTPATIENT
Start: 2024-01-07 | End: 2024-01-09 | Stop reason: HOSPADM

## 2024-01-07 RX ORDER — ONDANSETRON 2 MG/ML
4 INJECTION INTRAMUSCULAR; INTRAVENOUS EVERY 30 MIN PRN
Status: DISCONTINUED | OUTPATIENT
Start: 2024-01-07 | End: 2024-01-07

## 2024-01-07 RX ORDER — AMOXICILLIN 250 MG
1 CAPSULE ORAL 2 TIMES DAILY PRN
Status: DISCONTINUED | OUTPATIENT
Start: 2024-01-07 | End: 2024-01-09 | Stop reason: HOSPADM

## 2024-01-07 RX ORDER — IOPAMIDOL 755 MG/ML
500 INJECTION, SOLUTION INTRAVASCULAR ONCE
Status: COMPLETED | OUTPATIENT
Start: 2024-01-07 | End: 2024-01-07

## 2024-01-07 RX ORDER — MORPHINE SULFATE 4 MG/ML
4 INJECTION, SOLUTION INTRAMUSCULAR; INTRAVENOUS
Status: DISCONTINUED | OUTPATIENT
Start: 2024-01-07 | End: 2024-01-07

## 2024-01-07 RX ORDER — MULTIVIT WITH MINERALS/LUTEIN
1 TABLET ORAL DAILY
COMMUNITY

## 2024-01-07 RX ORDER — ATORVASTATIN CALCIUM 40 MG/1
40 TABLET, FILM COATED ORAL DAILY
Status: DISCONTINUED | OUTPATIENT
Start: 2024-01-07 | End: 2024-01-09 | Stop reason: HOSPADM

## 2024-01-07 RX ORDER — METOPROLOL TARTRATE 25 MG/1
25 TABLET, FILM COATED ORAL 2 TIMES DAILY
Status: DISCONTINUED | OUTPATIENT
Start: 2024-01-07 | End: 2024-01-09 | Stop reason: HOSPADM

## 2024-01-07 RX ORDER — HYDROMORPHONE HCL IN WATER/PF 6 MG/30 ML
0.4 PATIENT CONTROLLED ANALGESIA SYRINGE INTRAVENOUS
Status: DISCONTINUED | OUTPATIENT
Start: 2024-01-07 | End: 2024-01-09 | Stop reason: HOSPADM

## 2024-01-07 RX ORDER — AMOXICILLIN 250 MG
2 CAPSULE ORAL 2 TIMES DAILY PRN
Status: DISCONTINUED | OUTPATIENT
Start: 2024-01-07 | End: 2024-01-09 | Stop reason: HOSPADM

## 2024-01-07 RX ADMIN — ONDANSETRON 4 MG: 2 INJECTION INTRAMUSCULAR; INTRAVENOUS at 02:23

## 2024-01-07 RX ADMIN — METOPROLOL TARTRATE 25 MG: 25 TABLET, FILM COATED ORAL at 11:21

## 2024-01-07 RX ADMIN — MORPHINE SULFATE 4 MG: 4 INJECTION, SOLUTION INTRAMUSCULAR; INTRAVENOUS at 02:22

## 2024-01-07 RX ADMIN — SODIUM CHLORIDE 61 ML: 9 INJECTION, SOLUTION INTRAVENOUS at 03:17

## 2024-01-07 RX ADMIN — HYDROMORPHONE HYDROCHLORIDE 0.4 MG: 0.2 INJECTION, SOLUTION INTRAMUSCULAR; INTRAVENOUS; SUBCUTANEOUS at 06:24

## 2024-01-07 RX ADMIN — ONDANSETRON 4 MG: 2 INJECTION INTRAMUSCULAR; INTRAVENOUS at 14:45

## 2024-01-07 RX ADMIN — SODIUM CHLORIDE: 9 INJECTION, SOLUTION INTRAVENOUS at 06:24

## 2024-01-07 RX ADMIN — HYDROMORPHONE HYDROCHLORIDE 0.2 MG: 0.2 INJECTION, SOLUTION INTRAMUSCULAR; INTRAVENOUS; SUBCUTANEOUS at 14:45

## 2024-01-07 RX ADMIN — ATORVASTATIN CALCIUM 40 MG: 40 TABLET, FILM COATED ORAL at 11:21

## 2024-01-07 RX ADMIN — METOPROLOL TARTRATE 25 MG: 25 TABLET, FILM COATED ORAL at 19:52

## 2024-01-07 RX ADMIN — SODIUM CHLORIDE 1000 ML: 9 INJECTION, SOLUTION INTRAVENOUS at 02:21

## 2024-01-07 RX ADMIN — IOPAMIDOL 85 ML: 755 INJECTION, SOLUTION INTRAVENOUS at 03:17

## 2024-01-07 RX ADMIN — CEFTRIAXONE 2 G: 2 INJECTION, POWDER, FOR SOLUTION INTRAMUSCULAR; INTRAVENOUS at 11:20

## 2024-01-07 RX ADMIN — SODIUM CHLORIDE: 9 INJECTION, SOLUTION INTRAVENOUS at 17:31

## 2024-01-07 ASSESSMENT — ACTIVITIES OF DAILY LIVING (ADL)
ADLS_ACUITY_SCORE: 26
ADLS_ACUITY_SCORE: 35
ADLS_ACUITY_SCORE: 26
ADLS_ACUITY_SCORE: 26
ADLS_ACUITY_SCORE: 42
ADLS_ACUITY_SCORE: 26
ADLS_ACUITY_SCORE: 26
ADLS_ACUITY_SCORE: 37
ADLS_ACUITY_SCORE: 35

## 2024-01-07 NOTE — ED NOTES
Xray verification for NG-tube suggested further advancement of tube. 2 RN's advanced tube. Pt c/o difficulty swallowing, gagging when suction turned on, and general discomfort. MD Munoz updated. NG-tube taken out d/t discomfort and difficulty swallowing. RN will continue to monitor pt.

## 2024-01-07 NOTE — ED NOTES
[facility]  ED Nurse Handoff Report    ED Chief complaint: Abdominal Pain  . ED Diagnosis:   Final diagnoses:   SBO (small bowel obstruction) (H)       Allergies: No Known Allergies    Code Status: Full Code    Activity level - Baseline/Home:  independent.  Activity Level - Current:   Independent  Lift room needed: No.   Bariatric: No   Needed: No   Isolation: No.   Infection: Not Applicable.     Respiratory status: Room air    Vital Signs (within 30 minutes):   Vitals:    01/07/24 0213 01/07/24 0223 01/07/24 0233 01/07/24 0303   BP:   (!) 166/90 (!) 164/90   Pulse:   77 75   Resp:       Temp:       TempSrc:       SpO2: 96% 97%  95%   Weight:       Height:           Cardiac Rhythm:  ,      Pain level:    Patient confused: No.   Patient Falls Risk: patient and family education.   Elimination Status: Has voided     Patient Report - Initial Complaint: Abdominal pain.   Focused Assessment:  history of hypertension, atrial fibrillation, and paraesophageal hernia who presents to the ED with abdominal pain and vomiting. Patient has a history of cholecystectomy. Patient reports he has been experiencing abdominal pain that started at around 2300 yesterday. Describes the abdominal pain to be generalized. Additional symptoms include diarrhea and vomiting. Diarrhea occurred 1 day ago. He took imodium for this and it helped.       Abnormal Results:   Labs Ordered and Resulted from Time of ED Arrival to Time of ED Departure   COMPREHENSIVE METABOLIC PANEL - Abnormal       Result Value    Sodium 136      Potassium 4.0      Carbon Dioxide (CO2) 22      Anion Gap 13      Urea Nitrogen 27.2 (*)     Creatinine 1.26 (*)     GFR Estimate 56 (*)     Calcium 8.6 (*)     Chloride 101      Glucose 177 (*)     Alkaline Phosphatase 88      AST 38      ALT 33      Protein Total 6.3 (*)     Albumin 3.9      Bilirubin Total 0.7     CBC WITH PLATELETS AND DIFFERENTIAL - Abnormal    WBC Count 12.8 (*)     RBC  Count 3.64 (*)     Hemoglobin 13.5      Hematocrit 38.3 (*)      (*)     MCH 37.1 (*)     MCHC 35.2      RDW 11.9      Platelet Count 156      % Neutrophils 81      % Lymphocytes 13      % Monocytes 5      % Eosinophils 0      % Basophils 0      % Immature Granulocytes 1      NRBCs per 100 WBC 0      Absolute Neutrophils 10.3 (*)     Absolute Lymphocytes 1.6      Absolute Monocytes 0.7      Absolute Eosinophils 0.0      Absolute Basophils 0.0      Absolute Immature Granulocytes 0.1      Absolute NRBCs 0.0     LIPASE - Normal    Lipase 14     LACTIC ACID WHOLE BLOOD - Normal    Lactic Acid 1.6     ROUTINE UA WITH MICROSCOPIC        Abd/pelvis CT,  IV  contrast only TRAUMA / AAA   Final Result   IMPRESSION:    1.  Fluid-filled, dilated mid and distal small bowel. Prominent right colon. Findings either due to small bowel obstruction or ileus. Transition point not visualized.   2.  Small amount of free fluid.   3.  Diverticulosis of duodenum, small bowel and colon.          Treatments provided: See MAR  Family Comments: Bedside  OBS brochure/video discussed/provided to patient:  N/A  ED Medications:   Medications   ondansetron (ZOFRAN) injection 4 mg (4 mg Intravenous $Given 1/7/24 0223)   morphine (PF) injection 4 mg (4 mg Intravenous $Given 1/7/24 0222)   sodium chloride 0.9% BOLUS 1,000 mL (1,000 mLs Intravenous $New Bag 1/7/24 0221)   iopamidol (ISOVUE-370) solution 500 mL (85 mLs Intravenous $Given 1/7/24 0317)   Sodium Chloride for CT Scan Flush Use (61 mLs Intravenous $Given 1/7/24 0317)       Drips infusing:  No  For the majority of the shift this patient was Green.   Interventions performed were n/a.    Sepsis treatment initiated: No    Cares/treatment/interventions/medications to be completed following ED care: n/a    ED Nurse Name: Sebastian Pete RN  4:21 AM    RECEIVING UNIT ED HANDOFF REVIEW    Above ED Nurse Handoff Report was reviewed: Yes  Reviewed by: Veronique Joyce RN on January 7, 2024 at  1:38 PM

## 2024-01-07 NOTE — ED NOTES
[facility]    ED Boarding Nurse Handoff Addendum Report:    Date/time: 1/7/2024, 1:18 PM    Activity Level: assist of 1    Fall Risk: Yes:  bed/chair alarm on, call light in reach, fall risk band applied    Active Infusions: NS at 100/hr    Current Meds Due: Pt asking about eliquis- Med Rec done, await further info    Current care needs: Pain assess and nausea assess if symptoms return, may need NG replaced    Oxygen requirements (liters/min and/or FiO2): none    Respiratory status: Room air    Vital signs (within last 30 minutes):    Vitals:    01/07/24 0520 01/07/24 0609 01/07/24 0841 01/07/24 1000   BP:  (!) 153/83 (!) 144/82    BP Location:  Left arm Left arm    Pulse:  78 87    Resp:  18 16    Temp:  97.7  F (36.5  C) 97.6  F (36.4  C)    TempSrc:  Oral Oral    SpO2: 98% 95% 100% 99%   Weight:       Height:           Focused assessment within last 30 minutes:    GI- Bowel sounds are hypo to RLQ, faint to rest of quadrants    ED Boarding Nurse name: Sushma Dumas RN

## 2024-01-07 NOTE — H&P
[facility] History and Physical  Department of Internal Medicine    Patient Name: Pablo Fuentes MRN# [id number]   Age: 83 year old YOB: 1940     Date of Admission:1/7/2024    Primary care provider: Isaac Nunez  Date of Service: 1/7/2024         Assessment and Plan:   Pablo Fuentes is a 83 year old male with history of CAD status post PCI, hypertension, hyperlipidemia, osteoarthritis, history of laparoscopic cholecystectomy, herniorrhaphy who presents with complaints of abdominal pain and vomiting with onset tonight at around 10    Small bowel obstruction, likely due to ileus.  CT abdomen and pelvis did not show any transition point except fluid-filled and dilated mid to distal small bowel and prominent right colon.  Nasogastric tube placed for decompression, collection to low intermittent suction.  Abdominal pain, analgesics and opioids as needed  Nausea with vomiting.  Reglan IV as needed alternating with Zofran as needed  Hiccups  History of herniorrhaphy  History of laparoscopic cholecystectomy  Leukocytosis likely reactive  History of CAD status post PCI, NSTEMI.  No chest pain or dyspnea complaints.  Continue PTA metoprolol, statin aspirin  Mild acute kidney injury likely due to vomiting and GI loss.  Hold lisinopril.  Continue IV fluids, repeat BMP in  Benign essential hypertension, continue metoprolol, hold lisinopril due to KRISTA  Dyslipidemia on atorvastatin  History of osteoporosis, status post bilateral knee arthroplasty    CODE: Full code  Diet/IVF: Normal saline at 100 cc/h, n.p.o. diet except for meds  DVT ppx: Lovenox 40 mg subcu daily  Disposition/Admission Status: Observation    Ryan Rodriguez MD  Internal Medicine Staff Hospitalist  [facility]  Pager: [phone]       Chief Complaint:   Pain and vomiting         HPI:   This is an 83-year-old male who was brought to the ED for evaluation of an acute onset abdominal pain and vomiting.   history is  obtained from the patient and the patient's daughter who was present at the bedside.  Patient states he normally gets his once a day male around 3:00 in the afternoon.  He started having abdominal pain around 10:00 in the evening.  He started feeling nauseous and spitting copious amount of mucousy secretions but no vomiting until he called his daughter to take him to the hospital.  En Route to the ED he felt nauseous and vomited x 1.  It was a bilingual congested material.  He states yesterday he had 3 episodes of watery diarrhea.  He has been passing gas including during his ED stay.  He did not have any vomiting in the ER.  Labs done showed normal potassium and sodium, has slightly elevated serum creatinine level of 1.26.  No known history of chronic kidney disease.  His past medical history significant for history of CAD, hypertension, no coronary stents, dyslipidemia.  Denies any chest pain, shortness of breath or palpitations.  He also denies any dysuria urgency or frequency.  No any edema or pain in the lower extremities  In the ER, he is afebrile, vital signs stable.   CT of the abdomen pelvis done showed fluid-filled and distended mid and distal small bowel's and a prominent right colon.  There is a transition point.  Nasogastric tube was placed and.  He was started on low intermittent suction           Past Medical History:     Past Medical History:   Diagnosis Date    Anxiety     BPH (benign prostatic hyperplasia)     Coronary artery disease     Depressive disorder     Diverticulosis of large intestine     Dyslipidemia     Hypertension     Osteoarthritis     Paraesophageal hernia     Paroxysmal atrial fibrillation      Reviewed         Past Surgical History:     Past Surgical History:   Procedure Laterality Date    ARTHROPLASTY KNEE Left 09/2018    ARTHROPLASTY KNEE Right 06/2018    BRONCHOSCOPY FLEXIBLE N/A 02/13/2015    Procedure: BRONCHOSCOPY FLEXIBLE;  Surgeon: Christo Gilbert MD;  Location:  [facility] OR    CATARACT EXTRACTION      CHOLECYSTECTOMY  2021    CT CORONARY ANGIOGRAM  2017    KHANG mid Cx NSTEMI    ESOPHAGOSCOPY, GASTROSCOPY, DUODENOSCOPY (EGD), COMBINED N/A 02/10/2015    Procedure: COMBINED ESOPHAGOSCOPY, GASTROSCOPY, DUODENOSCOPY (EGD);  Surgeon: Christo Gilbert MD;  Location: [facility] OR    HERNIA REPAIR      LAPAROSCOPIC ASSISTED INSERTION TUBE GASTROTOMY N/A 2015    Procedure: LAPAROSCOPIC ASSISTED INSERTION TUBE GASTROSTOMY;  Surgeon: Christo Gilbert MD;  Location: [facility] OR    LAPAROSCOPIC HERNIORRHAPHY GIANT PARAESOPHAGEAL N/A 2015    Procedure: LAPAROSCOPIC HERNIORRHAPHY GIANT PARAESOPHAGEAL;  Surgeon: Christo Gilbert MD;  Location: [facility] OR    LAPAROSCOPY DIAGNOSTIC (GENERAL) N/A 2017    Procedure: LAPAROSCOPY DIAGNOSTIC (GENERAL);  Surgeon: Ramón Garcia MD;  Location: [facility] OR              Social History:   Reviewed  Social History     Socioeconomic History    Marital status:      Spouse name: Not on file    Number of children: Not on file    Years of education: Not on file    Highest education level: Not on file   Occupational History    Not on file   Tobacco Use    Smoking status: Former     Types: Cigarettes     Quit date: 1966     Years since quittin.0    Smokeless tobacco: Never    Tobacco comments:     was up to 3 packs daily    Substance and Sexual Activity    Alcohol use: No    Drug use: No    Sexual activity: Not on file   Other Topics Concern    Not on file   Social History Narrative    Not on file     Social Determinants of Health     Financial Resource Strain: Not on file   Food Insecurity: Not on file   Transportation Needs: Not on file   Physical Activity: Not on file   Stress: Not on file   Social Connections: Not on file   Interpersonal Safety: Not on file   Housing Stability: Not on file            Family History:   No any Pertinent family history         Immunizations:     Immunization History   Administered  "Date(s) Administered    COVID-19 MONOVALENT 12+ (Pfizer) 04/17/2021, 05/08/2021, 12/01/2021    Influenza (High Dose) 3 valent vaccine 10/11/2013, 01/16/2015, 11/29/2016, 10/03/2017    Influenza (IIV3) PF 02/15/2007    Pneumo Conj 13-V (2010&after) 03/17/2016    Pneumococcal 23 valent 04/13/2017              Allergies:    No Known Allergies         Medications:     Current Outpatient Medications   Medication Instructions    ASPIRIN EC PO 81 mg, Oral, DAILY    atorvastatin (LIPITOR) 40 mg, Oral, DAILY    clopidogrel (PLAVIX) 75 mg, Oral, DAILY    lisinopril (ZESTRIL) 5 mg, Oral, DAILY    LORAZEPAM PO 0.5 mg, Oral, EVERY 4 HOURS PRN    Metoprolol Tartrate (LOPRESSOR PO) 25 mg, Oral, 2 TIMES DAILY, HOLD if SBP <110 OR HR < 55. Call if held x 2 in a row symptomatic    nitroglycerin (NITROSTAT) 0.4 MG sublingual tablet For chest pain place 1 tablet under the tongue every 5 minutes for 3 doses. If symptoms persist 5 minutes after 1st dose call 911.                 Review of Systems:   A complete ROS was performed and is negative other than what is stated in the HPI.          Physical Exam:   BP (!) 164/90   Pulse 75   Temp 97  F (36.1  C) (Temporal)   Resp 18   Ht 1.702 m (5' 7\")   Wt 77.1 kg (170 lb)   SpO2 95%   BMI 26.63 kg/m       GENERAL: Nasogastric tube in place, alert and oriented x 3. NAD.   HEENT: Anicteric sclera. Mucous membranes moist.   CV: RRR. S1, S2. No murmurs appreciated.   RESPIRATORY: Effort normal on room air. Lungs CTAB with no wheezing, rales, rhonchi.   GI: Nondistended, soft, bowel sounds are active.  No any guarding or rigidity.  Hernial sites are free   NEUROLOGICAL: No focal deficits. Moves all extremities.    EXTREMITIES: No peripheral edema. Intact bilateral pedal pulses.   SKIN: No jaundice. No rashes.          Data:   CBC RESULTS:   Recent Labs   Lab Test 01/07/24  0220   WBC 12.8*   RBC 3.64*   HGB 13.5   HCT 38.3*   *   MCH 37.1*   MCHC 35.2   RDW 11.9        Last " Comprehensive Metabolic Panel:  Sodium   Date Value Ref Range Status   01/07/2024 136 135 - 145 mmol/L Final     Comment:     Reference intervals for this test were updated on 09/26/2023 to more accurately reflect our healthy population. There may be differences in the flagging of prior results with similar values performed with this method. Interpretation of those prior results can be made in the context of the updated reference intervals.    08/27/2019 141.0 136.0 - 145.0 mmol/L Final     Potassium   Date Value Ref Range Status   01/07/2024 4.0 3.4 - 5.3 mmol/L Final   08/27/2019 4.8 3.5 - 5.1 mmol/L Final     Chloride   Date Value Ref Range Status   01/07/2024 101 98 - 107 mmol/L Final   08/27/2019 105.0 98.0 - 107.0 mmol/L Final     Carbon Dioxide   Date Value Ref Range Status   08/27/2019 26.5 21.0 - 32.0 mmol/L Final     Carbon Dioxide (CO2)   Date Value Ref Range Status   01/07/2024 22 22 - 29 mmol/L Final     Anion Gap   Date Value Ref Range Status   01/07/2024 13 7 - 15 mmol/L Final   08/27/2019 14.3 9.0 - 19.0 mmol/L Final     Glucose   Date Value Ref Range Status   01/07/2024 177 (H) 70 - 99 mg/dL Final   08/27/2019 106 (H) 70 - 99 mg/dL Final     Urea Nitrogen   Date Value Ref Range Status   01/07/2024 27.2 (H) 8.0 - 23.0 mg/dL Final   08/27/2019 28.0 (H) 7.0 - 18.0 mg/dL Final     Creatinine   Date Value Ref Range Status   01/07/2024 1.26 (H) 0.67 - 1.17 mg/dL Final   08/27/2019 1.06 0.60 - 1.30 mg/dL Final     GFR Estimate   Date Value Ref Range Status   01/07/2024 56 (L) >60 mL/min/1.73m2 Final   09/12/2018 >60 >60 mL/min/1.73m2 Final   09/12/2018 >60 >60 mL/min/1.73m2 Final     Calcium   Date Value Ref Range Status   01/07/2024 8.6 (L) 8.8 - 10.2 mg/dL Final   08/27/2019 9.0 8.5 - 10.1 mg/dL Final     Bilirubin Total   Date Value Ref Range Status   01/07/2024 0.7 <=1.2 mg/dL Final   08/27/2019 1.03 (H) 0.20 - 1.00 mg/dL Final     Alkaline Phosphatase   Date Value Ref Range Status   01/07/2024 88 40 -  150 U/L Final     Comment:     Reference intervals for this test were updated on 11/14/2023 to more accurately reflect our healthy population. There may be differences in the flagging of prior results with similar values performed with this method. Interpretation of those prior results can be made in the context of the updated reference intervals.   08/27/2019 104.0 0.0 - 116.0 U/L Final     ALT   Date Value Ref Range Status   01/07/2024 33 0 - 70 U/L Final     Comment:     Reference intervals for this test were updated on 6/12/2023 to more accurately reflect our healthy population. There may be differences in the flagging of prior results with similar values performed with this method. Interpretation of those prior results can be made in the context of the updated reference intervals.     08/27/2019 30.0 0.0 - 78.0 U/L Final     AST   Date Value Ref Range Status   01/07/2024 38 0 - 45 U/L Final     Comment:     Reference intervals for this test were updated on 6/12/2023 to more accurately reflect our healthy population. There may be differences in the flagging of prior results with similar values performed with this method. Interpretation of those prior results can be made in the context of the updated reference intervals.   08/27/2019 23.0 0.0 - 37.0 U/L Final     Discussed with the patient and the patient's daughter who was present at the bedside.  Patient verbalized understanding and agreement with the plan all his questions answered

## 2024-01-07 NOTE — ED TRIAGE NOTES
Pt had diarrhea a day ago, pt took immodim then. Pt c/o abd pain that started 3hrs ago. Pt reports nausea.

## 2024-01-07 NOTE — ED PROVIDER NOTES
History     Chief Complaint:  Abdominal Pain     The history is provided by the patient.      Pablo Fuentes is a 83 year old male with history of hypertension, atrial fibrillation, and paraesophageal hernia who presents to the ED with abdominal pain and vomiting. Patient has a history of cholecystectomy. Patient reports he has been experiencing abdominal pain that started at around 2300 yesterday. Describes the abdominal pain to be generalized. Additional symptoms include diarrhea and vomiting. Diarrhea occurred 1 day ago. He took imodium for this and it helped.     Independent Historian:   None - Patient Only    Review of External Notes:   I reviewed Care Everywhere and updated Epic.     Medications:    ASPIRIN EC PO  atorvastatin (LIPITOR) 40 MG tablet  clopidogrel (PLAVIX) 75 MG tablet  lisinopril (PRINIVIL/ZESTRIL) 10 MG tablet  LORAZEPAM PO  Metoprolol Tartrate (LOPRESSOR PO)  nitroglycerin (NITROSTAT) 0.4 MG sublingual tablet    Past Medical History:    Past Medical History:   Diagnosis Date    Anxiety     BPH (benign prostatic hyperplasia)     Coronary artery disease     Depressive disorder     Diverticulosis of large intestine     Dyslipidemia     Hypertension     Osteoarthritis     Paraesophageal hernia     Paroxysmal atrial fibrillation      Past Surgical History:    Past Surgical History:   Procedure Laterality Date    ARTHROPLASTY KNEE Left 09/2018    ARTHROPLASTY KNEE Right 06/2018    BRONCHOSCOPY FLEXIBLE N/A 02/13/2015    Procedure: BRONCHOSCOPY FLEXIBLE;  Surgeon: Christo Gilbert MD;  Location: [facility]    CATARACT EXTRACTION      CHOLECYSTECTOMY  06/2021    CT CORONARY ANGIOGRAM  04/03/2017    KHANG mid Cx NSTEMI    ESOPHAGOSCOPY, GASTROSCOPY, DUODENOSCOPY (EGD), COMBINED N/A 02/10/2015    Procedure: COMBINED ESOPHAGOSCOPY, GASTROSCOPY, DUODENOSCOPY (EGD);  Surgeon: Christo Gilbert MD;  Location: [facility]    HERNIA REPAIR      LAPAROSCOPIC ASSISTED INSERTION TUBE GASTROTOMY N/A  "02/13/2015    Procedure: LAPAROSCOPIC ASSISTED INSERTION TUBE GASTROSTOMY;  Surgeon: Christo Gilbert MD;  Location: [facility]    LAPAROSCOPIC HERNIORRHAPHY GIANT PARAESOPHAGEAL N/A 02/13/2015    Procedure: LAPAROSCOPIC HERNIORRHAPHY GIANT PARAESOPHAGEAL;  Surgeon: Christo Gilbert MD;  Location: [facility]    LAPAROSCOPY DIAGNOSTIC (GENERAL) N/A 03/23/2017    Procedure: LAPAROSCOPY DIAGNOSTIC (GENERAL);  Surgeon: Ramón Garcia MD;  Location: [facility] OR     Physical Exam   Patient Vitals for the past 24 hrs:   BP Temp Temp src Pulse Resp SpO2 Height Weight   01/07/24 0303 164/90 -- -- 75 -- 95 % -- --   01/07/24 0233  166/90 -- -- 77 -- -- -- --   01/07/24 0223 -- -- -- -- -- 97 % -- --   01/07/24 0213 -- -- -- -- -- 96 % -- --   01/07/24 0203 164/91 -- -- 75 -- 98 % -- --   01/07/24 0129 148/100 97  F (36.1  C) Temporal 77 18 99 % 1.702 m (5' 7\") 77.1 kg (170 lb)      Physical Exam  Nursing note and vitals reviewed.  Constitutional: Cooperative. Uncomfortable appearing.  HENT:   Mouth/Throat: Mucous membranes are dry  Cardiovascular: Normal rate, regular rhythm and normal heart sounds.  No murmur.  Pulmonary/Chest: Effort normal and breath sounds normal. No respiratory distress. No wheezes.   Abdominal: Mild distention.  Diffuse right sided tenderness with mild voluntary guarding.  No rigidity.  Neurological: Alert.   Skin: Skin is warm and dry.   Psychiatric: Normal mood and affect.     Emergency Department Course   Imaging:  Abd/pelvis CT,  IV  contrast only TRAUMA / AAA   Final Result   IMPRESSION:    1.  Fluid-filled, dilated mid and distal small bowel. Prominent right colon. Findings either due to small bowel obstruction or ileus. Transition point not visualized.   2.  Small amount of free fluid.   3.  Diverticulosis of duodenum, small bowel and colon.         Laboratory:  Labs Ordered and Resulted from Time of ED Arrival to Time of ED Departure   COMPREHENSIVE METABOLIC PANEL - Abnormal       Result " Value    Sodium 136      Potassium 4.0      Carbon Dioxide (CO2) 22      Anion Gap 13      Urea Nitrogen 27.2 (*)     Creatinine 1.26 (*)     GFR Estimate 56 (*)     Calcium 8.6 (*)     Chloride 101      Glucose 177 (*)     Alkaline Phosphatase 88      AST 38      ALT 33      Protein Total 6.3 (*)     Albumin 3.9      Bilirubin Total 0.7     CBC WITH PLATELETS AND DIFFERENTIAL - Abnormal    WBC Count 12.8 (*)     RBC Count 3.64 (*)     Hemoglobin 13.5      Hematocrit 38.3 (*)      (*)     MCH 37.1 (*)     MCHC 35.2      RDW 11.9      Platelet Count 156      % Neutrophils 81      % Lymphocytes 13      % Monocytes 5      % Eosinophils 0      % Basophils 0      % Immature Granulocytes 1      NRBCs per 100 WBC 0      Absolute Neutrophils 10.3 (*)     Absolute Lymphocytes 1.6      Absolute Monocytes 0.7      Absolute Eosinophils 0.0      Absolute Basophils 0.0      Absolute Immature Granulocytes 0.1      Absolute NRBCs 0.0     LIPASE - Normal    Lipase 14     LACTIC ACID WHOLE BLOOD - Normal    Lactic Acid 1.6     ROUTINE UA WITH MICROSCOPIC: Pending collection      Interventions:  Medications   ondansetron (ZOFRAN) injection 4 mg (4 mg Intravenous $Given 1/7/24 0223)   morphine (PF) injection 4 mg (4 mg Intravenous $Given 1/7/24 0222)   sodium chloride 0.9% BOLUS 1,000 mL (1,000 mLs Intravenous $New Bag 1/7/24 0221)      NG tube to be placed by nursing and hooked up to intermittent suction    Assessments:  0153 I obtained the history and examined the patient as noted above.  0354 I rechecked and updated the patient.    Independent Interpretation (X-rays, CTs, rhythm strip):  None    Consultations/Discussion of Management or Tests:  0404 I consulted Dr. Rodriguez (hospitalist) regarding the patient.    Social Determinants of Health affecting care:   None    Disposition:  The patient was admitted to the hospital under the care of Dr. Rodriguez.     Impression & Plan      Medical Decision Making:  Is a 3-year-old M presents  with vomiting and abdominal pain and distention.  Workup consistent with probable small bowel obstruction.  No definite transition point is identified so ileus is certainly a possibility.  That said I think he would benefit from proximal decompression with nasogastric tube placement and will be admitted for medical management.  Lactic acid is reassuring.  No need for surgical intervention this evening    Diagnosis:    ICD-10-CM    1. SBO (small bowel obstruction) (H)  K56.609          Scribe Disclosure:  I, Cuong Warner, am serving as a scribe at 2:06 AM on 1/7/2024 to document services personally performed by Tristen Munoz MD based on my observations and the provider's statements to me.     1/7/2024   Tristen Munoz MD Amdahl, John, MD  01/07/24 0414

## 2024-01-07 NOTE — PLAN OF CARE
[facility]    ED Boarding Nurse Handoff Addendum Report:    Date/time: 1/7/2024, 6:38 AM    Activity Level: standby    Fall Risk: Yes:  patient and family education and assistive device/personal items within reach    Active Infusions: NS 100ml/hr    Current care needs: bowel rest, pain and nausea managment      Vital signs (within last 30 minutes):    Vitals:    01/07/24 0303 01/07/24 0519 01/07/24 0520 01/07/24 0609   BP: (!) 164/90   (!) 153/83   BP Location:    Left arm   Pulse: 75   78   Resp:    18   Temp:    97.7  F (36.5  C)   TempSrc:    Oral   SpO2: 95% 98% 98% 95%   Weight:       Height:           Focused assessment within last 30 minutes:    Pt reported 6/10 dull abdominal pain. IV dilaudid given and relieved pain to 1/10. NPO. EKG 12 lead ordered; this RN clarified order with provider who stated it was not needed. Pt had difficulty remembering day of last bowel movement stating he had had diarrhea a few days ago, took immodium and since then has not had a BM.     ED Boarding Nurse name: Bentley Sheppard RN

## 2024-01-07 NOTE — PHARMACY-ADMISSION MEDICATION HISTORY
Pharmacy Intern Admission Medication History    Admission medication history is complete. The information provided in this note is only as accurate as the sources available at the time of the update.    Information Source(s): Patient via in-person    Pertinent Information: NA    Changes made to PTA medication list:  Added: Eliquis, hydrochlorothiazide, centrum silver, biotin   Deleted: Aspirin, plavix, lorazepam  Changed: Lisinopril, Metoprolol    Medication Affordability:  Not including over the counter (OTC) medications, was there a time in the past 3 months when you did not take your medications as prescribed because of cost?: No    Allergies reviewed with patient and updates made in EHR: yes    Medication History Completed By: Jhoana Walters 1/7/2024 10:31 AM    PTA Med List   Medication Sig Last Dose    apixaban ANTICOAGULANT (ELIQUIS) 5 MG tablet Take 5 mg by mouth 2 times daily 1/6/2024 at AM    atorvastatin (LIPITOR) 40 MG tablet Take 1 tablet (40 mg) by mouth daily 1/6/2024 at AM    BIOTIN PO Take 1 tablet by mouth daily 1/6/2024 at AM    hydrochlorothiazide (HYDRODIURIL) 25 MG tablet Take 25 mg by mouth daily 1/6/2024 at AM    lisinopril (ZESTRIL) 20 MG tablet Take 20 mg by mouth daily 1/6/2024 at AM    Metoprolol Tartrate (LOPRESSOR PO) Take 50 mg by mouth 2 times daily HOLD if SBP <110 OR HR < 55. Call if held x 2 in a row symptomatic 1/6/2024 at AM    multivitamin (CENTRUM SILVER) tablet Take 1 tablet by mouth daily 1/6/2024 at AM    nitroglycerin (NITROSTAT) 0.4 MG sublingual tablet For chest pain place 1 tablet under the tongue every 5 minutes for 3 doses. If symptoms persist 5 minutes after 1st dose call 911. Past Week

## 2024-01-08 LAB
ERYTHROCYTE [DISTWIDTH] IN BLOOD BY AUTOMATED COUNT: 12.2 % (ref 10–15)
HCT VFR BLD AUTO: 43 % (ref 40–53)
HGB BLD-MCNC: 14.4 G/DL (ref 13.3–17.7)
HOLD SPECIMEN: NORMAL
MCH RBC QN AUTO: 35.8 PG (ref 26.5–33)
MCHC RBC AUTO-ENTMCNC: 33.5 G/DL (ref 31.5–36.5)
MCV RBC AUTO: 107 FL (ref 78–100)
PLATELET # BLD AUTO: 139 10E3/UL (ref 150–450)
RBC # BLD AUTO: 4.02 10E6/UL (ref 4.4–5.9)
WBC # BLD AUTO: 4.7 10E3/UL (ref 4–11)

## 2024-01-08 PROCEDURE — 36415 COLL VENOUS BLD VENIPUNCTURE: CPT | Performed by: INTERNAL MEDICINE

## 2024-01-08 PROCEDURE — 99232 SBSQ HOSP IP/OBS MODERATE 35: CPT | Performed by: INTERNAL MEDICINE

## 2024-01-08 PROCEDURE — 120N000001 HC R&B MED SURG/OB

## 2024-01-08 PROCEDURE — 250N000013 HC RX MED GY IP 250 OP 250 PS 637: Performed by: INTERNAL MEDICINE

## 2024-01-08 PROCEDURE — 85027 COMPLETE CBC AUTOMATED: CPT | Performed by: INTERNAL MEDICINE

## 2024-01-08 PROCEDURE — 250N000011 HC RX IP 250 OP 636: Performed by: INTERNAL MEDICINE

## 2024-01-08 PROCEDURE — 258N000003 HC RX IP 258 OP 636: Performed by: INTERNAL MEDICINE

## 2024-01-08 RX ADMIN — APIXABAN 5 MG: 5 TABLET, FILM COATED ORAL at 20:14

## 2024-01-08 RX ADMIN — APIXABAN 5 MG: 5 TABLET, FILM COATED ORAL at 08:39

## 2024-01-08 RX ADMIN — SODIUM CHLORIDE: 9 INJECTION, SOLUTION INTRAVENOUS at 15:21

## 2024-01-08 RX ADMIN — METOPROLOL TARTRATE 25 MG: 25 TABLET, FILM COATED ORAL at 08:39

## 2024-01-08 RX ADMIN — CEFTRIAXONE 2 G: 2 INJECTION, POWDER, FOR SOLUTION INTRAMUSCULAR; INTRAVENOUS at 11:07

## 2024-01-08 RX ADMIN — SODIUM CHLORIDE: 9 INJECTION, SOLUTION INTRAVENOUS at 04:41

## 2024-01-08 RX ADMIN — METOPROLOL TARTRATE 25 MG: 25 TABLET, FILM COATED ORAL at 20:14

## 2024-01-08 RX ADMIN — ATORVASTATIN CALCIUM 40 MG: 40 TABLET, FILM COATED ORAL at 08:39

## 2024-01-08 ASSESSMENT — ACTIVITIES OF DAILY LIVING (ADL)
ADLS_ACUITY_SCORE: 26

## 2024-01-08 NOTE — UTILIZATION REVIEW
[facility] Utilization Review  Admission Status; Secondary Review Determination     Admission Date: 1/7/2024  1:30 AM      Under the authority of the Utilization Management Committee, the utilization review process indicated a secondary review on the above patient.  The review outcome is based on review of the medical records, discussions with staff, and applying clinical experience noted on the date of the review.        (X)      Inpatient Status Appropriate - This patient's medical care is consistent with medical management for inpatient care and reasonable inpatient medical practice.          RATIONALE FOR DETERMINATION   Pablo Fuentes is a 83 year old male with past medical history of abdominal surgeries, laparoscopic cholecystectomy, who presented with nausea, vomiting and abdominal distention.  He is admitted with acute small bowel obstruction, likely due to ileus, confirmed on CT abdomen.  NG tube placed for decompression and put to intermittent suctioning, symptomatic treatment with IV antiemetics and analgesics, IV fluids for mild KRISTA.  Mild clinical improvement, continues to be n.p.o. and getting IV fluids.  Plans for conservative management until resolution of bowel obstruction.  Cares are rendered complex due to advanced age, prior abdominal surgeries and new bowel obstruction.  Anticipated length of stay is more than 2 midnights and with complexity of care, need for ongoing management, criteria for inpatient admission is met.           The definitions of Inpatient Status and Observation Status used in making the determination above are those provided in the CMS Coverage Manual, Chapter 1 and Chapter 6, section 70.4.      Sincerely,       Popeye Boyce MD, MS  Physician Advisor  Utilization Review-[place]    Phone: [phone]

## 2024-01-08 NOTE — PLAN OF CARE
"Provided cares for pt from [id number]. Pt a/o x4. C/o abdominal pain, IV dilaudid given with relief. C/o nausea, IV zofran given with relief. Assist of 1 gait belt and walker. Voided. BM x1, pt stated it was formed. NPO. IV fluids infusing. Was told by previous nurse that pt has not had NG tube in since 0700 and that MD said it is okay to hold off on placing new NG tube unless admitting symptoms come back. Monitor pain and nausea over night.     BP (!) 148/93 (BP Location: Left arm)   Pulse 82   Temp 98.1  F (36.7  C) (Oral)   Resp 18   Ht 1.702 m (5' 7\")   Wt 77.1 kg (170 lb)   SpO2 95%   BMI 26.63 kg/m                             "

## 2024-01-08 NOTE — PROGRESS NOTES
Hospitalist Medicine Progress Note   [facility]       Pablo Fuentes is a 83 year old gentleman with CAD status post PCI, hypertension, hyperlipidemia, osteoarthritis, history of laparoscopic cholecystectomy, herniorrhaphy who presented to the [facility]  with complaints of abdominal pain and vomiting 1/7/2024 CT scan of the abdomen showed  Fluid-filled, dilated mid and distal small bowel. Prominent right colon. Findings either due to small bowel obstruction or ileus. Transition point not visualized. Urine showed Pyuria with 104 WBC and positive Leucocyte esterase with normal Lactic acid and WBC of 12.8 in Blood        Date of Admission:  1/7/2024  Assessment & Plan     Small bowel obstruction versus ileus  Patient is now passing gas per rectum with decreasing abdominal pain without any nausea or vomiting  Patient has past history of herniorrhaphy and laparoscopic cholecystectomy  Also has UTI which could be causing an ileus  Patient will be started on clear liquid diet  CBC will be monitored with BMP  Will continue IV fluids for now    Acute urinary tract infection  Patient has 104 WBCs in the urine with large leukocyte esterase  Patient was started on ceftriaxone 1/7/2024  Urine culture will be monitored and followed and antibiotics accordingly adjusted    Acute kidney injury  Patient's baseline creatinine is around 0.76 but was 1.26 at admission which has decreased to 1.18  Will monitor BMP    Hypercholesteremia  Continue atorvastatin          Plan:   Clear liquid diet  CBC and BMP in the morning  Discharge patient home in 1 to 2 days    Diet: NPO for Medical/Clinical Reasons Except for: Ice Chips, Meds    DVT Prophylaxis: DOAC  Tolliver Catheter: Not present  Code Status: Full Code               The patient's care was discussed with the Patient and RN.    Bruno Maddox MD  Hospitalist Service  [facility]  Hospital    ______________________________________________________________________    Interval History     Symptoms   Patient says that the abdominal pain is much less and that he is passing gas per rectum but has not had a bowel movement    Review of Systems:   Denies any nausea or vomiting    Data reviewed today: I reviewed all medications, new labs and imaging results over the last 24 hours.     Physical Exam   Vital Signs: Temp: 97.3  F (36.3  C) Temp src: Oral BP: (!) 150/99 Pulse: 82   Resp: 18 SpO2: 99 % O2 Device: None (Room air)    Weight: 170 lbs 0 oz      GENERAL: Patient is not in acute distress  HEENT: EOM+,Conjunctiva is clear   NECK:  no Jugular Venous distention  HEART: S1 S2 regular Rate and Rhythm, there is  no murmur,   LUNGS: Respirations are not laboured, Lungs are clear to auscultation without Crepitations or Wheezing   ABDOMEN: Soft, there is no tenderness, Bowel Sounds are  Positive   LOWER LIMBS: no  Pedal Edema  Bilaterally   CNS:  Alert,  Oriented x 3, Moving all the Four Limbs     Data   Recent Labs   Lab 01/07/24  0718 01/07/24  0220   WBC  --  12.8*   HGB  --  13.5   MCV  --  105*   PLT  --  156    136   POTASSIUM 4.0 4.0   CHLORIDE 102 101   CO2 23 22   BUN 26.8* 27.2*   CR 1.18* 1.26*   ANIONGAP 11 13   JOANN 8.0* 8.6*   * 177*   ALBUMIN  --  3.9   PROTTOTAL  --  6.3*   BILITOTAL  --  0.7   ALKPHOS  --  88   ALT  --  33   AST  --  38   LIPASE  --  14         No results found for this or any previous visit (from the past 24 hour(s)).

## 2024-01-08 NOTE — PLAN OF CARE
"Care from [id number]  Inpatient Progress Note    /87 (BP Location: Left arm)   Pulse 90   Temp 97.3  F (36.3  C) (Oral)   Resp 18   Ht 1.702 m (5' 7\")   Wt 77.1 kg (170 lb)   SpO2 95%   BMI 26.63 kg/m      A&O x3, a little disoriented to time and forgetful at times. VSS, RA. Up w/ A1. Using bedside urinal. Does report some mild abd discomfort but denies pain or nausea at this time. Passing gas per pt report. Tolerating ice chips otherwise NPO. Cont NS at 100 ml/hr. Sleeping b/t cares and able to make needs known.                         "

## 2024-01-09 VITALS
OXYGEN SATURATION: 93 % | HEIGHT: 67 IN | BODY MASS INDEX: 26.68 KG/M2 | HEART RATE: 75 BPM | TEMPERATURE: 97.7 F | RESPIRATION RATE: 18 BRPM | DIASTOLIC BLOOD PRESSURE: 93 MMHG | WEIGHT: 170 LBS | SYSTOLIC BLOOD PRESSURE: 162 MMHG

## 2024-01-09 LAB
ANION GAP SERPL CALCULATED.3IONS-SCNC: 9 MMOL/L (ref 7–15)
BACTERIA UR CULT: NORMAL
BUN SERPL-MCNC: 22.2 MG/DL (ref 8–23)
CALCIUM SERPL-MCNC: 7.5 MG/DL (ref 8.8–10.2)
CHLORIDE SERPL-SCNC: 109 MMOL/L (ref 98–107)
CREAT SERPL-MCNC: 0.96 MG/DL (ref 0.67–1.17)
DEPRECATED HCO3 PLAS-SCNC: 22 MMOL/L (ref 22–29)
EGFRCR SERPLBLD CKD-EPI 2021: 77 ML/MIN/1.73M2
ERYTHROCYTE [DISTWIDTH] IN BLOOD BY AUTOMATED COUNT: 12.1 % (ref 10–15)
GLUCOSE SERPL-MCNC: 142 MG/DL (ref 70–99)
HCT VFR BLD AUTO: 37.8 % (ref 40–53)
HGB BLD-MCNC: 12.9 G/DL (ref 13.3–17.7)
MCH RBC QN AUTO: 36.4 PG (ref 26.5–33)
MCHC RBC AUTO-ENTMCNC: 34.1 G/DL (ref 31.5–36.5)
MCV RBC AUTO: 107 FL (ref 78–100)
PLATELET # BLD AUTO: 112 10E3/UL (ref 150–450)
POTASSIUM SERPL-SCNC: 3.9 MMOL/L (ref 3.4–5.3)
RBC # BLD AUTO: 3.54 10E6/UL (ref 4.4–5.9)
SODIUM SERPL-SCNC: 140 MMOL/L (ref 135–145)
WBC # BLD AUTO: 4.9 10E3/UL (ref 4–11)

## 2024-01-09 PROCEDURE — 36415 COLL VENOUS BLD VENIPUNCTURE: CPT | Performed by: INTERNAL MEDICINE

## 2024-01-09 PROCEDURE — 80048 BASIC METABOLIC PNL TOTAL CA: CPT | Performed by: INTERNAL MEDICINE

## 2024-01-09 PROCEDURE — 258N000003 HC RX IP 258 OP 636: Performed by: INTERNAL MEDICINE

## 2024-01-09 PROCEDURE — 250N000011 HC RX IP 250 OP 636: Performed by: INTERNAL MEDICINE

## 2024-01-09 PROCEDURE — 250N000013 HC RX MED GY IP 250 OP 250 PS 637: Performed by: INTERNAL MEDICINE

## 2024-01-09 PROCEDURE — 85027 COMPLETE CBC AUTOMATED: CPT | Performed by: INTERNAL MEDICINE

## 2024-01-09 PROCEDURE — 99238 HOSP IP/OBS DSCHRG MGMT 30/<: CPT | Performed by: INTERNAL MEDICINE

## 2024-01-09 RX ORDER — CEFDINIR 300 MG/1
300 CAPSULE ORAL 2 TIMES DAILY
Qty: 10 CAPSULE | Refills: 0 | Status: SHIPPED | OUTPATIENT
Start: 2024-01-09 | End: 2024-01-14

## 2024-01-09 RX ADMIN — APIXABAN 5 MG: 5 TABLET, FILM COATED ORAL at 08:38

## 2024-01-09 RX ADMIN — CEFTRIAXONE 2 G: 2 INJECTION, POWDER, FOR SOLUTION INTRAMUSCULAR; INTRAVENOUS at 11:38

## 2024-01-09 RX ADMIN — METOPROLOL TARTRATE 25 MG: 25 TABLET, FILM COATED ORAL at 08:38

## 2024-01-09 RX ADMIN — SODIUM CHLORIDE: 9 INJECTION, SOLUTION INTRAVENOUS at 00:58

## 2024-01-09 RX ADMIN — ATORVASTATIN CALCIUM 40 MG: 40 TABLET, FILM COATED ORAL at 08:39

## 2024-01-09 ASSESSMENT — ACTIVITIES OF DAILY LIVING (ADL)
ADLS_ACUITY_SCORE: 26

## 2024-01-09 NOTE — PROGRESS NOTES
Patient's After Visit Summary was reviewed with patient and/or advocate.   Patient verbalized understanding of After Visit Summary, recommended follow up and was given an opportunity to ask questions.   Discharge medications sent home with patient/family: No   Discharged with daughter

## 2024-01-09 NOTE — PLAN OF CARE
"Care from [id number]    Inpatient Progress Note:  For complete assessment see flow sheet documentation.    BP (!) 145/86 (BP Location: Left arm)   Pulse 73   Temp 97.2  F (36.2  C) (Oral)   Resp 18   Ht 1.702 m (5' 7\")   Wt 77.1 kg (170 lb)   SpO2 96%   BMI 26.63 kg/m        Orientation: A&Ox4  Pain status: Denied  Activity: Ax1 with walker and gait belt  Resp: WNL  Cardiac: WNL  GI: WNL  :  WNL, urinal at bedside  LDA: PIV  Infusions: NS @ 100ml/hr   Diet: Clear  Discharge Plan: Possible discharge 1/9    Will continue to monitor and provide cares.     Yeimi Hobbs RN  "

## 2024-01-09 NOTE — DISCHARGE SUMMARY
"[facility]  Hospitalist Discharge Summary      Date of Admission:  1/7/2024  Date of Discharge:  1/9/2024  Discharging Provider: Bruno Maddox MD  Discharge Service: Hospitalist Service    Discharge Diagnoses   Small bowel obstruction versus ileus   Acute urinary tract infection   Acute kidney injury   Hypercholesteremia       Clinically Significant Risk Factors     # Overweight: Estimated body mass index is 26.63 kg/m  as calculated from the following:    Height as of this encounter: 1.702 m (5' 7\").    Weight as of this encounter: 77.1 kg (170 lb).       Follow-ups Needed After Discharge   Follow-up Appointments     Follow-up and recommended labs and tests       Follow up with primary care provider, Isaac Nunez, within 7 days   for hospital follow- up.  The following labs/tests are recommended: BMP   Follow up on Urine cultures .  Patient was advised to have US KUB re: UTI but would discuss with   Isaac Nunez before doing it            Discharge Disposition   Discharged to home  Condition at discharge: Stable    Hospital Course   Pablo Fuentes is a 83 year old gentleman with CAD status post PCI, hypertension, hyperlipidemia, osteoarthritis, history of laparoscopic cholecystectomy, herniorrhaphy who presented to the [facility]  with complaints of abdominal pain and vomiting 1/7/2024 CT scan of the abdomen showed  Fluid-filled, dilated mid and distal small bowel. Prominent right colon. Findings either due to small bowel obstruction or ileus. Transition point not visualized. Urine showed Pyuria with 104 WBC and positive Leucocyte esterase with normal Lactic acid and WBC of 12.8 in Blood decreased to 4.9 at discharge.  Patient did have bowel movement without any nausea or vomiting on eating.  Unfortunately urine is not growing anything and is contaminated with urogenital marie will give him antibiotic for total of 7 days.   I did advise him to undergo ultrasound evaluation of the urinary " tract but he is not interested in this at this time and states that he will discuss with his primary care physician.  On asking why he is not interested he says he is not inclined to do another surgery on him anymore..  I did explain to him that diagnoses need to be made before any plan of treatment is decided. .    Consultations This Hospital Stay   None    Code Status   Full Code    Time Spent on this Encounter   I, rBuno Maddox MD, personally saw the patient today and spent less than or equal to 30 minutes discharging this patient.       Bruno Maddox MD  [facility]  [address] [place] [address]  Phone: [phone]  Fax: [phone]  ______________________________________________________________________    Physical Exam   Vital Signs: Temp: 97.7  F (36.5  C) Temp src: Oral BP: (!) 162/93 Pulse: 75   Resp: 18 SpO2: 93 % O2 Device: None (Room air)    Weight: 170 lbs 0 oz  GENERAL: Patient is not in acute distress  HEENT: EOM+,Conjunctiva is clear   NECK:  no Jugular Venous distention  HEART: S1 S2 regular Rate and Rhythm, there is  no murmur,   LUNGS: Respirations are not laboured, Lungs are clear to auscultation without Crepitations or Wheezing   ABDOMEN: Soft, there is no tenderness, Bowel Sounds are  Positive   LOWER LIMBS: no  Pedal Edema  Bilaterally   CNS:  Alert,  Oriented x 3, Moving all the Four Limbs           Primary Care Physician   Isaac Nunez    Discharge Orders      Reason for your hospital stay    presented to the [facility]  with complaints of abdominal pain and vomiting 1/7/2024 CT scan of the abdomen showed  Fluid-filled, dilated mid and distal small bowel. Prominent right colon. Findings either due to small bowel obstruction or ileus. Transition point not visualized. Urine showed Pyuria with 104 WBC and positive Leucocyte esterase     Follow-up and recommended labs and tests     Follow up with primary care provider, Isaac Nunez, within 7 days  for hospital follow- up.  The following labs/tests are recommended: BMP   Follow up on Urine cultures .  Patient was advised to have US KUB re: UTI but would discuss with Isaac Nunez before doing it     Activity    Your activity upon discharge: activity as tolerated     Diet    Follow this diet upon discharge: Orders Placed This Encounter      Clear Liquid Diet       Significant Results and Procedures   Most Recent 3 CBC's:  Recent Labs   Lab Test 01/09/24  1102 01/08/24  0945 01/07/24  0220   WBC 4.9 4.7 12.8*   HGB 12.9* 14.4 13.5   * 107* 105*   * 139* 156     Most Recent 3 BMP's:  Recent Labs   Lab Test 01/09/24  1102 01/07/24  0718 01/07/24  0220    136 136   POTASSIUM 3.9 4.0 4.0   CHLORIDE 109* 102 101   CO2 22 23 22   BUN 22.2 26.8* 27.2*   CR 0.96 1.18* 1.26*   ANIONGAP 9 11 13   JOANN 7.5* 8.0* 8.6*   * 139* 177*     Most Recent 2 LFT's:  Recent Labs   Lab Test 01/07/24  0220 08/27/19  0000   AST 38 23.0   ALT 33 30.0   ALKPHOS 88 104.0   BILITOTAL 0.7 1.03*     7-Day Micro Results       Collected Updated Procedure Result Status      01/07/2024 0623 01/09/2024 0933 Urine Culture [[id number]]   Urine, Clean Catch    Final result Component Value   Culture 10,000-50,000 CFU/mL Mixture of Urogenital [place]                     Most Recent Urinalysis:  Recent Labs   Lab Test 01/07/24  0623   COLOR Yellow   APPEARANCE Clear   URINEGLC Negative   URINEBILI Negative   URINEKETONE Negative   SG 1.010   UBLD Negative   URINEPH 5.0   PROTEIN 10*   NITRITE Negative   LEUKEST Large*   RBCU 0   WBCU 104*   ,   Results for orders placed or performed during the hospital encounter of 01/07/24   Abd/pelvis CT,  IV  contrast only TRAUMA / AAA    Narrative    EXAM: CT ABDOMEN PELVIS W CONTRAST  LOCATION: [facility]  DATE: 1/7/2024    INDICATION: Right sided abd pain  COMPARISON: 03/23/2017  TECHNIQUE: CT scan of the abdomen and pelvis was performed following injection of  IV contrast. Multiplanar reformats were obtained. Dose reduction techniques were used.  CONTRAST: 85mL Isovue 370    FINDINGS:   LOWER CHEST: Coronary artery calcification. Basilar atelectasis.    HEPATOBILIARY: Subcentimeter right hepatic hypodensity small for characterization. Cholecystectomy. Mild biliary dilatation.    PANCREAS: Atrophic.    SPLEEN: Normal.    ADRENAL GLANDS: Mild nodular thickening of the adrenal glands.    KIDNEYS/BLADDER: Normal.    BOWEL: Fluid-filled, dilated loops of mid and distal small bowel without discrete transition. Mild distention of the right colon. Diverticulosis of duodenum, small bowel and colon. Small amount of free fluid. Appendix normal.    LYMPH NODES: Normal.    VASCULATURE: Atherosclerotic vascular calcification.    PELVIC ORGANS: Bladder is under distended but the wall appears diffusely thickened.    MUSCULOSKELETAL: Osseous demineralization. Degenerative change osseous structures.      Impression    IMPRESSION:   1.  Fluid-filled, dilated mid and distal small bowel. Prominent right colon. Findings either due to small bowel obstruction or ileus. Transition point not visualized.  2.  Small amount of free fluid.  3.  Diverticulosis of duodenum, small bowel and colon.   XR Abdomen Port 1 View    Narrative    EXAM: XR ABDOMEN PORT 1 VIEW  LOCATION: [facility]  DATE: 1/7/2024    INDICATION: NG tube placement  COMPARISON: 01/07/2024      Impression    IMPRESSION: Enteric tube tip is within distal esophagus. Advancement recommended. Elevated right hemidiaphragm. Atelectasis or infiltrate left lung base. Multiple dilated loops of bowel throughout the abdomen and pelvis.       Discharge Medications   Current Discharge Medication List        START taking these medications    Details   cefdinir (OMNICEF) 300 MG capsule Take 1 capsule (300 mg) by mouth 2 times daily for 5 days  Qty: 10 capsule, Refills: 0    Associated Diagnoses: Acute UTI           CONTINUE  these medications which have NOT CHANGED    Details   apixaban ANTICOAGULANT (ELIQUIS) 5 MG tablet Take 5 mg by mouth 2 times daily      atorvastatin (LIPITOR) 40 MG tablet Take 1 tablet (40 mg) by mouth daily  Qty: 30 tablet, Refills: 3    Associated Diagnoses: NSTEMI (non-ST elevated myocardial infarction) (H)      BIOTIN PO Take 1 tablet by mouth daily      hydrochlorothiazide (HYDRODIURIL) 25 MG tablet Take 25 mg by mouth daily      lisinopril (ZESTRIL) 20 MG tablet Take 20 mg by mouth daily      Metoprolol Tartrate (LOPRESSOR PO) Take 50 mg by mouth 2 times daily HOLD if SBP <110 OR HR < 55. Call if held x 2 in a row symptomatic      multivitamin (CENTRUM SILVER) tablet Take 1 tablet by mouth daily      nitroglycerin (NITROSTAT) 0.4 MG sublingual tablet For chest pain place 1 tablet under the tongue every 5 minutes for 3 doses. If symptoms persist 5 minutes after 1st dose call 911.  Qty: 25 tablet, Refills: 0    Associated Diagnoses: NSTEMI (non-ST elevated myocardial infarction) (H)           Allergies   No Known Allergies

## 2024-05-07 NOTE — PATIENT INSTRUCTIONS
Due to your blood pressure running lower, I am decreasing the lisinopril to 5 mg (half a tab) once a day.   Otherwise continue your current medications.   Continue to work on a heart healthy diet, limiting cholesterol and sodium.   Continue cardiac rehab.   Follow up in 2 weeks to review the monitor results.    oral Yes

## (undated) DEVICE — SUCTION CANISTER MEDIVAC LINER 3000ML W/LID 65651-530

## (undated) DEVICE — SU VICRYL 2-0 TIE 12X18" J905T

## (undated) DEVICE — GOWN IMPERVIOUS ZONED XLG 9041

## (undated) DEVICE — TUBING INSUFFLATION W/FILTER 10FT GS1016

## (undated) DEVICE — GLOVE PROTEXIS POWDER FREE 8.0 ORTHOPEDIC 2D73ET80

## (undated) DEVICE — LINEN POUCH DBL 5427

## (undated) DEVICE — ESU GROUND PAD ADULT W/CORD E7507

## (undated) DEVICE — ENDO CANNULA 05MM VERSAONE UNIVERSAL UNVCA5STF

## (undated) DEVICE — LINEN FULL SHEET 5511

## (undated) DEVICE — GLOVE PROTEXIS BLUE W/NEU-THERA 7.0  2D73EB70

## (undated) DEVICE — GLOVE PROTEXIS POWDER FREE SMT 6.5  2D72PT65X

## (undated) DEVICE — DRAPE LAP W/ARMBOARD 29410

## (undated) DEVICE — SU VICRYL 2-0 SH 27" J317H

## (undated) DEVICE — ENDO TROCAR 05MM VERSAONE BLADELESS W/STD FIX CAN NONB5STF

## (undated) DEVICE — ESU CORD MONOPOLAR 10'  E0510

## (undated) DEVICE — ESU ELEC BLADE 2.75" COATED/INSULATED E1455

## (undated) DEVICE — SU SILK 0 24" TIE SA76G

## (undated) DEVICE — SU PDS II 0 CTX 60" Z990G

## (undated) DEVICE — ESU PENCIL W/HOLSTER E2350H

## (undated) DEVICE — LINEN TOWEL PACK X5 5464

## (undated) DEVICE — SUCTION CANISTER STRAW 65652-008

## (undated) DEVICE — BAG CLEAR TRASH 1.3M 39X33" P4040C

## (undated) DEVICE — CATH TRAY FOLEY SURESTEP 16FR DRAIN BAG STATOCK A899916

## (undated) DEVICE — ENDO TROCAR BLUNT 100MM W/THRD ANCHOR BLUNTPORT BPT12STS

## (undated) DEVICE — SU VICRYL 3-0 SH CR 8X18" J774

## (undated) DEVICE — PACK MAJOR SBA15MAFSI

## (undated) DEVICE — DRSG GAUZE 4X4" 8044

## (undated) DEVICE — LINEN TOWEL PACK X10 5473

## (undated) DEVICE — SOL NACL 0.9% IRRIG 1000ML BOTTLE 2F7124

## (undated) DEVICE — SU PROLENE 2-0 RB-1DA 36" 8559H

## (undated) DEVICE — SU VICRYL 3-0 TIE 12X18" J904T

## (undated) DEVICE — SYR 10ML SLIP TIP W/O NDL 303134

## (undated) DEVICE — DRSG STERI STRIP 1/2X4" R1547

## (undated) DEVICE — GLOVE PROTEXIS POWDER FREE SMT 7.5  2D72PT75X

## (undated) DEVICE — ESU ELEC BLADE 6" COATED/INSULATED E1455-6

## (undated) DEVICE — LINEN HALF SHEET 5512

## (undated) DEVICE — SU DERMABOND MINI DHVM12

## (undated) DEVICE — PREP CHLORAPREP 26ML TINTED ORANGE  260815

## (undated) DEVICE — BLADE KNIFE SURG 11 371111

## (undated) DEVICE — GOWN XLG DISP 9545

## (undated) DEVICE — SU VICRYL 0 TIE 12X18" J906G

## (undated) RX ORDER — FENTANYL CITRATE 50 UG/ML
INJECTION, SOLUTION INTRAMUSCULAR; INTRAVENOUS
Status: DISPENSED
Start: 2017-03-23

## (undated) RX ORDER — CEFAZOLIN SODIUM 1 G/3ML
INJECTION, POWDER, FOR SOLUTION INTRAMUSCULAR; INTRAVENOUS
Status: DISPENSED
Start: 2017-03-23

## (undated) RX ORDER — DEXAMETHASONE SODIUM PHOSPHATE 4 MG/ML
INJECTION, SOLUTION INTRA-ARTICULAR; INTRALESIONAL; INTRAMUSCULAR; INTRAVENOUS; SOFT TISSUE
Status: DISPENSED
Start: 2017-03-23

## (undated) RX ORDER — NEOSTIGMINE METHYLSULFATE 5 MG/5 ML
SYRINGE (ML) INTRAVENOUS
Status: DISPENSED
Start: 2017-03-23

## (undated) RX ORDER — LABETALOL HYDROCHLORIDE 5 MG/ML
INJECTION, SOLUTION INTRAVENOUS
Status: DISPENSED
Start: 2017-03-23

## (undated) RX ORDER — BUPIVACAINE HYDROCHLORIDE AND EPINEPHRINE 5; 5 MG/ML; UG/ML
INJECTION, SOLUTION EPIDURAL; INTRACAUDAL; PERINEURAL
Status: DISPENSED
Start: 2017-03-23

## (undated) RX ORDER — LIDOCAINE HYDROCHLORIDE 10 MG/ML
INJECTION, SOLUTION EPIDURAL; INFILTRATION; INTRACAUDAL; PERINEURAL
Status: DISPENSED
Start: 2017-03-23

## (undated) RX ORDER — GLYCOPYRROLATE 0.2 MG/ML
INJECTION INTRAMUSCULAR; INTRAVENOUS
Status: DISPENSED
Start: 2017-03-23

## (undated) RX ORDER — PROPOFOL 10 MG/ML
INJECTION, EMULSION INTRAVENOUS
Status: DISPENSED
Start: 2017-03-23